# Patient Record
Sex: MALE | Race: WHITE | Employment: OTHER | URBAN - NONMETROPOLITAN AREA
[De-identification: names, ages, dates, MRNs, and addresses within clinical notes are randomized per-mention and may not be internally consistent; named-entity substitution may affect disease eponyms.]

---

## 2017-02-14 RX ORDER — HYDRALAZINE HYDROCHLORIDE 25 MG/1
TABLET, FILM COATED ORAL
Qty: 270 TABLET | Refills: 1 | Status: SHIPPED | OUTPATIENT
Start: 2017-02-14 | End: 2017-08-02 | Stop reason: SDUPTHER

## 2017-02-14 RX ORDER — GLIPIZIDE 5 MG/1
TABLET ORAL
Qty: 90 TABLET | Refills: 1 | Status: SHIPPED | OUTPATIENT
Start: 2017-02-14 | End: 2017-08-02 | Stop reason: SDUPTHER

## 2017-03-08 RX ORDER — CLOPIDOGREL BISULFATE 75 MG/1
TABLET ORAL
Qty: 90 TABLET | Refills: 1 | Status: SHIPPED | OUTPATIENT
Start: 2017-03-08 | End: 2017-10-02 | Stop reason: SDUPTHER

## 2017-03-09 RX ORDER — NIFEDIPINE 60 MG/1
TABLET, FILM COATED, EXTENDED RELEASE ORAL
Qty: 180 TABLET | Refills: 1 | Status: SHIPPED | OUTPATIENT
Start: 2017-03-09 | End: 2017-09-12 | Stop reason: SDUPTHER

## 2017-03-09 RX ORDER — CARVEDILOL 12.5 MG/1
TABLET ORAL
Qty: 180 TABLET | Refills: 1 | Status: SHIPPED | OUTPATIENT
Start: 2017-03-09 | End: 2017-09-12 | Stop reason: SDUPTHER

## 2017-03-10 ENCOUNTER — HOSPITAL ENCOUNTER (OUTPATIENT)
Dept: MEDSURG UNIT | Age: 77
Setting detail: OBSERVATION
Discharge: HOME | End: 2017-03-11
Attending: EMERGENCY MEDICINE | Admitting: INTERNAL MEDICINE
Payer: MEDICARE

## 2017-03-10 DIAGNOSIS — J81.0 ACUTE PULMONARY EDEMA (HCC): Primary | ICD-10-CM

## 2017-03-10 LAB
ABSOLUTE EOS #: 0.4 K/UL (ref 0–0.4)
ABSOLUTE LYMPH #: 1.6 K/UL (ref 0.9–2.5)
ABSOLUTE MONO #: 0.8 K/UL (ref 0–1)
ANION GAP SERPL CALCULATED.3IONS-SCNC: 17 MMOL/L (ref 9–17)
BASOPHILS # BLD: 1 % (ref 0–2)
BASOPHILS ABSOLUTE: 0 K/UL (ref 0–0.2)
BNP INTERPRETATION: ABNORMAL
BUN BLDV-MCNC: 37 MG/DL (ref 8–23)
BUN/CREAT BLD: 23 (ref 9–20)
CALCIUM SERPL-MCNC: 9.2 MG/DL (ref 8.6–10.4)
CHLORIDE BLD-SCNC: 102 MMOL/L (ref 98–107)
CO2: 16 MMOL/L (ref 20–31)
CREAT SERPL-MCNC: 1.6 MG/DL (ref 0.7–1.2)
DIFFERENTIAL TYPE: YES
EOSINOPHILS RELATIVE PERCENT: 4 % (ref 0–5)
ESTIMATED AVERAGE GLUCOSE: 154 MG/DL
GFR AFRICAN AMERICAN: 51 ML/MIN
GFR NON-AFRICAN AMERICAN: 42 ML/MIN
GFR SERPL CREATININE-BSD FRML MDRD: ABNORMAL ML/MIN/{1.73_M2}
GFR SERPL CREATININE-BSD FRML MDRD: ABNORMAL ML/MIN/{1.73_M2}
GLUCOSE BLD-MCNC: 183 MG/DL (ref 70–99)
GLUCOSE BLD-MCNC: 225 MG/DL (ref 65–99)
HBA1C MFR BLD: 7 % (ref 4.8–5.9)
HCT VFR BLD CALC: 33 % (ref 41–53)
HEMOGLOBIN: 11.2 G/DL (ref 13.5–17.5)
LYMPHOCYTES # BLD: 19 % (ref 13–44)
MCH RBC QN AUTO: 29.6 PG (ref 26–34)
MCHC RBC AUTO-ENTMCNC: 33.9 G/DL (ref 31–37)
MCV RBC AUTO: 87.2 FL (ref 80–100)
MONOCYTES # BLD: 10 % (ref 5–9)
PDW BLD-RTO: 14.6 % (ref 12.1–15.2)
PLATELET # BLD: 365 K/UL (ref 140–450)
PLATELET ESTIMATE: ABNORMAL
PMV BLD AUTO: ABNORMAL FL (ref 6–12)
POTASSIUM SERPL-SCNC: 5.3 MMOL/L (ref 3.7–5.3)
PRO-BNP: 1444 PG/ML
RBC # BLD: 3.79 M/UL (ref 4.5–5.9)
RBC # BLD: ABNORMAL 10*6/UL
SEG NEUTROPHILS: 66 % (ref 39–75)
SEGMENTED NEUTROPHILS ABSOLUTE COUNT: 5.7 K/UL (ref 2.1–6.5)
SODIUM BLD-SCNC: 135 MMOL/L (ref 135–144)
TROPONIN INTERP: NORMAL
TROPONIN INTERP: NORMAL
TROPONIN T: <0.03 NG/ML
TROPONIN T: <0.03 NG/ML
WBC # BLD: 8.6 K/UL (ref 3.5–11)
WBC # BLD: ABNORMAL 10*3/UL

## 2017-03-10 PROCEDURE — 82947 ASSAY GLUCOSE BLOOD QUANT: CPT

## 2017-03-10 PROCEDURE — 80048 BASIC METABOLIC PNL TOTAL CA: CPT

## 2017-03-10 PROCEDURE — 96375 TX/PRO/DX INJ NEW DRUG ADDON: CPT

## 2017-03-10 PROCEDURE — 83880 ASSAY OF NATRIURETIC PEPTIDE: CPT

## 2017-03-10 PROCEDURE — 36415 COLL VENOUS BLD VENIPUNCTURE: CPT

## 2017-03-10 PROCEDURE — 94664 DEMO&/EVAL PT USE INHALER: CPT

## 2017-03-10 PROCEDURE — 85025 COMPLETE CBC W/AUTO DIFF WBC: CPT

## 2017-03-10 PROCEDURE — 93005 ELECTROCARDIOGRAM TRACING: CPT

## 2017-03-10 PROCEDURE — 84484 ASSAY OF TROPONIN QUANT: CPT

## 2017-03-10 PROCEDURE — 99283 EMERGENCY DEPT VISIT LOW MDM: CPT

## 2017-03-10 PROCEDURE — 99285 EMERGENCY DEPT VISIT HI MDM: CPT

## 2017-03-10 PROCEDURE — 9990 CHARGE CONVERSION

## 2017-03-10 PROCEDURE — 96376 TX/PRO/DX INJ SAME DRUG ADON: CPT

## 2017-03-10 PROCEDURE — 99219 CHARGE CONVERSION: CPT

## 2017-03-10 PROCEDURE — 96372 THER/PROPH/DIAG INJ SC/IM: CPT

## 2017-03-10 PROCEDURE — 71010 CHARGE CONVERSION: CPT

## 2017-03-10 PROCEDURE — 93010 ELECTROCARDIOGRAM REPORT: CPT

## 2017-03-10 PROCEDURE — 83036 HEMOGLOBIN GLYCOSYLATED A1C: CPT

## 2017-03-10 PROCEDURE — 96372 THER/PROPH/DIAG INJ SC/IM: CPT | Performed by: INTERNAL MEDICINE

## 2017-03-10 PROCEDURE — 96374 THER/PROPH/DIAG INJ IV PUSH: CPT

## 2017-03-10 RX ORDER — ACETAMINOPHEN 325 MG/1
650 TABLET ORAL EVERY 4 HOURS PRN
Status: DISCONTINUED | OUTPATIENT
Start: 2017-03-10 | End: 2017-03-11 | Stop reason: HOSPADM

## 2017-03-10 RX ORDER — DEXTROSE MONOHYDRATE 25 G/50ML
12.5 INJECTION, SOLUTION INTRAVENOUS PRN
Status: DISCONTINUED | OUTPATIENT
Start: 2017-03-10 | End: 2017-03-11 | Stop reason: HOSPADM

## 2017-03-10 RX ORDER — GLIPIZIDE 5 MG/1
2.5 TABLET ORAL
Status: DISCONTINUED | OUTPATIENT
Start: 2017-03-10 | End: 2017-03-11 | Stop reason: HOSPADM

## 2017-03-10 RX ORDER — FUROSEMIDE 40 MG/1
40 TABLET ORAL 2 TIMES DAILY
Status: DISCONTINUED | OUTPATIENT
Start: 2017-03-10 | End: 2017-03-10

## 2017-03-10 RX ORDER — HYDRALAZINE HYDROCHLORIDE 25 MG/1
25 TABLET, FILM COATED ORAL EVERY 8 HOURS SCHEDULED
Status: DISCONTINUED | OUTPATIENT
Start: 2017-03-10 | End: 2017-03-11 | Stop reason: HOSPADM

## 2017-03-10 RX ORDER — FLUTICASONE PROPIONATE 50 MCG
1 SPRAY, SUSPENSION (ML) NASAL DAILY
Status: DISCONTINUED | OUTPATIENT
Start: 2017-03-10 | End: 2017-03-11 | Stop reason: HOSPADM

## 2017-03-10 RX ORDER — ALLOPURINOL 100 MG/1
100 TABLET ORAL DAILY
Status: DISCONTINUED | OUTPATIENT
Start: 2017-03-10 | End: 2017-03-11 | Stop reason: HOSPADM

## 2017-03-10 RX ORDER — FAMOTIDINE 20 MG/1
20 TABLET, FILM COATED ORAL DAILY
Status: DISCONTINUED | OUTPATIENT
Start: 2017-03-10 | End: 2017-03-11 | Stop reason: HOSPADM

## 2017-03-10 RX ORDER — ASPIRIN 81 MG/1
81 TABLET, CHEWABLE ORAL ONCE
Status: DISCONTINUED | OUTPATIENT
Start: 2017-03-10 | End: 2017-03-11 | Stop reason: HOSPADM

## 2017-03-10 RX ORDER — FUROSEMIDE 10 MG/ML
40 INJECTION INTRAMUSCULAR; INTRAVENOUS ONCE
Status: COMPLETED | OUTPATIENT
Start: 2017-03-10 | End: 2017-03-10

## 2017-03-10 RX ORDER — SODIUM CHLORIDE 0.9 % (FLUSH) 0.9 %
10 SYRINGE (ML) INJECTION PRN
Status: DISCONTINUED | OUTPATIENT
Start: 2017-03-10 | End: 2017-03-11 | Stop reason: HOSPADM

## 2017-03-10 RX ORDER — ALBUTEROL SULFATE 2.5 MG/3ML
2.5 SOLUTION RESPIRATORY (INHALATION) ONCE
Status: COMPLETED | OUTPATIENT
Start: 2017-03-10 | End: 2017-03-10

## 2017-03-10 RX ORDER — NICOTINE POLACRILEX 4 MG
15 LOZENGE BUCCAL PRN
Status: DISCONTINUED | OUTPATIENT
Start: 2017-03-10 | End: 2017-03-11 | Stop reason: HOSPADM

## 2017-03-10 RX ORDER — HYDRALAZINE HYDROCHLORIDE 20 MG/ML
10 INJECTION INTRAMUSCULAR; INTRAVENOUS EVERY 6 HOURS PRN
Status: DISCONTINUED | OUTPATIENT
Start: 2017-03-10 | End: 2017-03-11 | Stop reason: HOSPADM

## 2017-03-10 RX ORDER — M-VIT,TX,IRON,MINS/CALC/FOLIC 27MG-0.4MG
1 TABLET ORAL DAILY
Status: DISCONTINUED | OUTPATIENT
Start: 2017-03-11 | End: 2017-03-11 | Stop reason: HOSPADM

## 2017-03-10 RX ORDER — ISOSORBIDE MONONITRATE 30 MG/1
30 TABLET, EXTENDED RELEASE ORAL DAILY
Refills: 5 | Status: ON HOLD | COMMUNITY
Start: 2017-02-14 | End: 2017-03-11 | Stop reason: HOSPADM

## 2017-03-10 RX ORDER — FERROUS SULFATE 325(65) MG
325 TABLET ORAL
Status: DISCONTINUED | OUTPATIENT
Start: 2017-03-11 | End: 2017-03-11 | Stop reason: HOSPADM

## 2017-03-10 RX ORDER — LOSARTAN POTASSIUM 100 MG/1
100 TABLET ORAL DAILY
Status: DISCONTINUED | OUTPATIENT
Start: 2017-03-11 | End: 2017-03-11 | Stop reason: HOSPADM

## 2017-03-10 RX ORDER — FERROUS SULFATE 325(65) MG
325 TABLET ORAL 2 TIMES DAILY
COMMUNITY

## 2017-03-10 RX ORDER — SIMVASTATIN 40 MG
40 TABLET ORAL NIGHTLY
Status: DISCONTINUED | OUTPATIENT
Start: 2017-03-10 | End: 2017-03-11 | Stop reason: HOSPADM

## 2017-03-10 RX ORDER — ONDANSETRON 2 MG/ML
4 INJECTION INTRAMUSCULAR; INTRAVENOUS EVERY 6 HOURS PRN
Status: DISCONTINUED | OUTPATIENT
Start: 2017-03-10 | End: 2017-03-11 | Stop reason: HOSPADM

## 2017-03-10 RX ORDER — ASPIRIN 325 MG
325 TABLET ORAL DAILY
Refills: 1 | COMMUNITY
Start: 2017-02-10 | End: 2017-03-10 | Stop reason: ALTCHOICE

## 2017-03-10 RX ORDER — CARVEDILOL 12.5 MG/1
12.5 TABLET ORAL 2 TIMES DAILY
Status: DISCONTINUED | OUTPATIENT
Start: 2017-03-10 | End: 2017-03-11 | Stop reason: HOSPADM

## 2017-03-10 RX ORDER — CLOPIDOGREL BISULFATE 75 MG/1
75 TABLET ORAL DAILY
Status: DISCONTINUED | OUTPATIENT
Start: 2017-03-10 | End: 2017-03-11 | Stop reason: HOSPADM

## 2017-03-10 RX ORDER — GEMFIBROZIL 600 MG/1
600 TABLET, FILM COATED ORAL DAILY
Status: DISCONTINUED | OUTPATIENT
Start: 2017-03-10 | End: 2017-03-11 | Stop reason: HOSPADM

## 2017-03-10 RX ORDER — GREEN TEA/HOODIA GORDONII 315-12.5MG
1 CAPSULE ORAL
Status: DISCONTINUED | OUTPATIENT
Start: 2017-03-11 | End: 2017-03-10 | Stop reason: SDUPTHER

## 2017-03-10 RX ORDER — DEXTROSE MONOHYDRATE 50 MG/ML
100 INJECTION, SOLUTION INTRAVENOUS PRN
Status: DISCONTINUED | OUTPATIENT
Start: 2017-03-10 | End: 2017-03-11 | Stop reason: HOSPADM

## 2017-03-10 RX ORDER — ASCORBIC ACID 500 MG
500 TABLET ORAL DAILY
Status: DISCONTINUED | OUTPATIENT
Start: 2017-03-11 | End: 2017-03-11 | Stop reason: HOSPADM

## 2017-03-10 RX ORDER — SODIUM CHLORIDE 0.9 % (FLUSH) 0.9 %
10 SYRINGE (ML) INJECTION EVERY 12 HOURS SCHEDULED
Status: DISCONTINUED | OUTPATIENT
Start: 2017-03-10 | End: 2017-03-11 | Stop reason: HOSPADM

## 2017-03-10 RX ORDER — NIFEDIPINE 30 MG/1
60 TABLET, EXTENDED RELEASE ORAL 2 TIMES DAILY
Status: DISCONTINUED | OUTPATIENT
Start: 2017-03-10 | End: 2017-03-11 | Stop reason: HOSPADM

## 2017-03-10 RX ORDER — FUROSEMIDE 10 MG/ML
20 INJECTION INTRAMUSCULAR; INTRAVENOUS 2 TIMES DAILY
Status: DISCONTINUED | OUTPATIENT
Start: 2017-03-10 | End: 2017-03-11 | Stop reason: HOSPADM

## 2017-03-10 RX ORDER — GREEN TEA/HOODIA GORDONII 315-12.5MG
1 CAPSULE ORAL
COMMUNITY
End: 2019-01-01 | Stop reason: ALTCHOICE

## 2017-03-10 RX ORDER — ISOSORBIDE MONONITRATE 30 MG/1
30 TABLET, EXTENDED RELEASE ORAL DAILY
Status: DISCONTINUED | OUTPATIENT
Start: 2017-03-10 | End: 2017-03-11

## 2017-03-10 RX ADMIN — FUROSEMIDE 40 MG: 10 INJECTION INTRAMUSCULAR; INTRAVENOUS at 15:19

## 2017-03-10 RX ADMIN — HYDRALAZINE HYDROCHLORIDE 25 MG: 25 TABLET ORAL at 20:32

## 2017-03-10 RX ADMIN — CARVEDILOL 12.5 MG: 12.5 TABLET, FILM COATED ORAL at 20:30

## 2017-03-10 RX ADMIN — Medication 40 MG: at 20:33

## 2017-03-10 RX ADMIN — NIFEDIPINE 60 MG: 30 TABLET, FILM COATED, EXTENDED RELEASE ORAL at 20:32

## 2017-03-10 RX ADMIN — ALLOPURINOL 100 MG: 100 TABLET ORAL at 20:30

## 2017-03-10 RX ADMIN — Medication 10 ML: at 22:55

## 2017-03-10 RX ADMIN — HYDRALAZINE HYDROCHLORIDE 10 MG: 20 INJECTION INTRAMUSCULAR; INTRAVENOUS at 22:55

## 2017-03-10 RX ADMIN — FUROSEMIDE 20 MG: 10 INJECTION, SOLUTION INTRAMUSCULAR; INTRAVENOUS at 21:11

## 2017-03-10 RX ADMIN — Medication 10 ML: at 21:10

## 2017-03-10 RX ADMIN — ALBUTEROL SULFATE 2.5 MG: 2.5 SOLUTION RESPIRATORY (INHALATION) at 15:23

## 2017-03-10 ASSESSMENT — ENCOUNTER SYMPTOMS
FACIAL SWELLING: 0
SWOLLEN GLANDS: 0
BLOOD IN STOOL: 0
ALLERGIC/IMMUNOLOGIC NEGATIVE: 1
ANAL BLEEDING: 0
SINUS PRESSURE: 0
SPUTUM PRODUCTION: 0
ABDOMINAL DISTENTION: 0
DIARRHEA: 0
TROUBLE SWALLOWING: 0
STRIDOR: 0
NAUSEA: 0
HEMOPTYSIS: 0
CHEST TIGHTNESS: 0
COUGH: 1
CHOKING: 0
RECTAL PAIN: 0
APNEA: 0
SORE THROAT: 0
SHORTNESS OF BREATH: 1
ABDOMINAL PAIN: 0
VOMITING: 0
EYES NEGATIVE: 1
WHEEZING: 0
RHINORRHEA: 0
CONSTIPATION: 0

## 2017-03-11 VITALS
OXYGEN SATURATION: 96 % | DIASTOLIC BLOOD PRESSURE: 87 MMHG | TEMPERATURE: 98.3 F | SYSTOLIC BLOOD PRESSURE: 190 MMHG | HEIGHT: 68 IN | BODY MASS INDEX: 29.15 KG/M2 | RESPIRATION RATE: 20 BRPM | HEART RATE: 85 BPM | WEIGHT: 192.3 LBS

## 2017-03-11 PROBLEM — R06.02 SHORTNESS OF BREATH: Status: ACTIVE | Noted: 2017-03-11

## 2017-03-11 LAB
ABSOLUTE EOS #: 0.2 K/UL (ref 0–0.4)
ABSOLUTE LYMPH #: 1.9 K/UL (ref 0.9–2.5)
ABSOLUTE MONO #: 0.7 K/UL (ref 0–1)
ANION GAP SERPL CALCULATED.3IONS-SCNC: 14 MMOL/L (ref 9–17)
BASOPHILS # BLD: 1 % (ref 0–2)
BASOPHILS ABSOLUTE: 0 K/UL (ref 0–0.2)
BUN BLDV-MCNC: 36 MG/DL (ref 8–23)
BUN/CREAT BLD: 22 (ref 9–20)
CALCIUM SERPL-MCNC: 9 MG/DL (ref 8.6–10.4)
CHLORIDE BLD-SCNC: 103 MMOL/L (ref 98–107)
CO2: 19 MMOL/L (ref 20–31)
CREAT SERPL-MCNC: 1.65 MG/DL (ref 0.7–1.2)
DIFFERENTIAL TYPE: YES
EKG ATRIAL RATE: 76 BPM
EKG ATRIAL RATE: 87 BPM
EKG P AXIS: 40 DEGREES
EKG P AXIS: 62 DEGREES
EKG P-R INTERVAL: 226 MS
EKG P-R INTERVAL: 232 MS
EKG Q-T INTERVAL: 386 MS
EKG Q-T INTERVAL: 402 MS
EKG QRS DURATION: 100 MS
EKG QRS DURATION: 102 MS
EKG QTC CALCULATION (BAZETT): 452 MS
EKG QTC CALCULATION (BAZETT): 464 MS
EKG R AXIS: 10 DEGREES
EKG R AXIS: 76 DEGREES
EKG T AXIS: 146 DEGREES
EKG T AXIS: 90 DEGREES
EKG VENTRICULAR RATE: 76 BPM
EKG VENTRICULAR RATE: 87 BPM
EOSINOPHILS RELATIVE PERCENT: 3 % (ref 0–5)
GFR AFRICAN AMERICAN: 50 ML/MIN
GFR NON-AFRICAN AMERICAN: 41 ML/MIN
GFR SERPL CREATININE-BSD FRML MDRD: ABNORMAL ML/MIN/{1.73_M2}
GFR SERPL CREATININE-BSD FRML MDRD: ABNORMAL ML/MIN/{1.73_M2}
GLUCOSE BLD-MCNC: 167 MG/DL (ref 65–99)
GLUCOSE BLD-MCNC: 171 MG/DL (ref 70–99)
HCT VFR BLD CALC: 30.8 % (ref 41–53)
HEMOGLOBIN: 10.4 G/DL (ref 13.5–17.5)
LYMPHOCYTES # BLD: 27 % (ref 13–44)
MCH RBC QN AUTO: 29.8 PG (ref 26–34)
MCHC RBC AUTO-ENTMCNC: 33.8 G/DL (ref 31–37)
MCV RBC AUTO: 88 FL (ref 80–100)
MONOCYTES # BLD: 10 % (ref 5–9)
PDW BLD-RTO: 14.4 % (ref 12.1–15.2)
PLATELET # BLD: 334 K/UL (ref 140–450)
PLATELET ESTIMATE: ABNORMAL
PMV BLD AUTO: ABNORMAL FL (ref 6–12)
POTASSIUM SERPL-SCNC: 3.7 MMOL/L (ref 3.7–5.3)
RBC # BLD: 3.49 M/UL (ref 4.5–5.9)
RBC # BLD: ABNORMAL 10*6/UL
SEG NEUTROPHILS: 59 % (ref 39–75)
SEGMENTED NEUTROPHILS ABSOLUTE COUNT: 4.2 K/UL (ref 2.1–6.5)
SODIUM BLD-SCNC: 136 MMOL/L (ref 135–144)
TROPONIN INTERP: NORMAL
TROPONIN T: <0.03 NG/ML
WBC # BLD: 7 K/UL (ref 3.5–11)
WBC # BLD: ABNORMAL 10*3/UL

## 2017-03-11 PROCEDURE — 6370000000 HC RX 637 (ALT 250 FOR IP): Performed by: INTERNAL MEDICINE

## 2017-03-11 PROCEDURE — G0378 HOSPITAL OBSERVATION PER HR: HCPCS

## 2017-03-11 PROCEDURE — 6360000002 HC RX W HCPCS: Performed by: INTERNAL MEDICINE

## 2017-03-11 PROCEDURE — 2580000003 HC RX 258: Performed by: INTERNAL MEDICINE

## 2017-03-11 PROCEDURE — 93005 ELECTROCARDIOGRAM TRACING: CPT

## 2017-03-11 PROCEDURE — 82947 ASSAY GLUCOSE BLOOD QUANT: CPT

## 2017-03-11 RX ORDER — ISOSORBIDE MONONITRATE 30 MG/1
30 TABLET, EXTENDED RELEASE ORAL 2 TIMES DAILY
Qty: 60 TABLET | Refills: 3 | OUTPATIENT
Start: 2017-03-11 | End: 2017-03-20 | Stop reason: SDUPTHER

## 2017-03-11 RX ORDER — ISOSORBIDE MONONITRATE 30 MG/1
30 TABLET, EXTENDED RELEASE ORAL 2 TIMES DAILY
Status: DISCONTINUED | OUTPATIENT
Start: 2017-03-11 | End: 2017-03-11 | Stop reason: HOSPADM

## 2017-03-11 RX ADMIN — METFORMIN HYDROCHLORIDE 1000 MG: 1000 TABLET ORAL at 08:06

## 2017-03-11 RX ADMIN — Medication 10 ML: at 08:00

## 2017-03-11 RX ADMIN — GEMFIBROZIL 600 MG: 600 TABLET, FILM COATED ORAL at 08:05

## 2017-03-11 RX ADMIN — HYDRALAZINE HYDROCHLORIDE 25 MG: 25 TABLET ORAL at 06:21

## 2017-03-11 RX ADMIN — CARVEDILOL 12.5 MG: 12.5 TABLET, FILM COATED ORAL at 08:04

## 2017-03-11 RX ADMIN — OXYCODONE HYDROCHLORIDE AND ACETAMINOPHEN 500 MG: 500 TABLET ORAL at 08:06

## 2017-03-11 RX ADMIN — CLOPIDOGREL BISULFATE 75 MG: 75 TABLET, FILM COATED ORAL at 08:04

## 2017-03-11 RX ADMIN — MULTIPLE VITAMINS W/ MINERALS TAB 1 TABLET: TAB at 08:06

## 2017-03-11 RX ADMIN — GLIPIZIDE 2.5 MG: 5 TABLET ORAL at 08:05

## 2017-03-11 RX ADMIN — ISOSORBIDE MONONITRATE 30 MG: 30 TABLET, EXTENDED RELEASE ORAL at 08:05

## 2017-03-11 RX ADMIN — LOSARTAN POTASSIUM 100 MG: 100 TABLET ORAL at 08:05

## 2017-03-11 RX ADMIN — FAMOTIDINE 20 MG: 20 TABLET, FILM COATED ORAL at 08:04

## 2017-03-11 RX ADMIN — FUROSEMIDE 20 MG: 10 INJECTION, SOLUTION INTRAMUSCULAR; INTRAVENOUS at 08:00

## 2017-03-11 RX ADMIN — Medication 325 MG: at 08:03

## 2017-03-11 RX ADMIN — IRON SUPPLEMENT 325 MG: 325 TABLET ORAL at 08:04

## 2017-03-11 RX ADMIN — NIFEDIPINE 60 MG: 30 TABLET, FILM COATED, EXTENDED RELEASE ORAL at 08:06

## 2017-03-13 ENCOUNTER — CARE COORDINATION (OUTPATIENT)
Dept: FAMILY MEDICINE CLINIC | Age: 77
End: 2017-03-13

## 2017-03-20 ENCOUNTER — OFFICE VISIT (OUTPATIENT)
Dept: FAMILY MEDICINE CLINIC | Age: 77
End: 2017-03-20
Payer: MEDICARE

## 2017-03-20 VITALS
SYSTOLIC BLOOD PRESSURE: 146 MMHG | WEIGHT: 187.25 LBS | HEIGHT: 68 IN | BODY MASS INDEX: 28.38 KG/M2 | HEART RATE: 84 BPM | DIASTOLIC BLOOD PRESSURE: 64 MMHG

## 2017-03-20 DIAGNOSIS — Z79.4 TYPE 2 DIABETES MELLITUS WITH STAGE 3 CHRONIC KIDNEY DISEASE, WITH LONG-TERM CURRENT USE OF INSULIN (HCC): ICD-10-CM

## 2017-03-20 DIAGNOSIS — I25.10 CORONARY ARTERY DISEASE INVOLVING NATIVE HEART WITHOUT ANGINA PECTORIS, UNSPECIFIED VESSEL OR LESION TYPE: ICD-10-CM

## 2017-03-20 DIAGNOSIS — E11.22 TYPE 2 DIABETES MELLITUS WITH STAGE 3 CHRONIC KIDNEY DISEASE, WITH LONG-TERM CURRENT USE OF INSULIN (HCC): ICD-10-CM

## 2017-03-20 DIAGNOSIS — I10 ESSENTIAL HYPERTENSION: ICD-10-CM

## 2017-03-20 DIAGNOSIS — N18.30 TYPE 2 DIABETES MELLITUS WITH STAGE 3 CHRONIC KIDNEY DISEASE, WITH LONG-TERM CURRENT USE OF INSULIN (HCC): ICD-10-CM

## 2017-03-20 DIAGNOSIS — J81.0 ACUTE PULMONARY EDEMA (HCC): Primary | ICD-10-CM

## 2017-03-20 PROCEDURE — 99495 TRANSJ CARE MGMT MOD F2F 14D: CPT | Performed by: FAMILY MEDICINE

## 2017-03-20 RX ORDER — ISOSORBIDE MONONITRATE 30 MG/1
30 TABLET, EXTENDED RELEASE ORAL 2 TIMES DAILY
Qty: 60 TABLET | Refills: 5 | Status: SHIPPED | OUTPATIENT
Start: 2017-03-20 | End: 2017-09-12 | Stop reason: SDUPTHER

## 2017-03-20 RX ORDER — NIFEDIPINE 60 MG/1
TABLET, EXTENDED RELEASE ORAL
Refills: 1 | COMMUNITY
Start: 2017-03-09 | End: 2017-05-11 | Stop reason: SDUPTHER

## 2017-03-20 ASSESSMENT — ENCOUNTER SYMPTOMS
SHORTNESS OF BREATH: 0
ABDOMINAL PAIN: 0
CONSTIPATION: 0
SORE THROAT: 0
DIARRHEA: 0
NAUSEA: 0
RHINORRHEA: 1
COUGH: 0

## 2017-04-06 RX ORDER — GEMFIBROZIL 600 MG/1
600 TABLET, FILM COATED ORAL DAILY
Qty: 90 TABLET | Refills: 1 | Status: SHIPPED | OUTPATIENT
Start: 2017-04-06 | End: 2017-10-10 | Stop reason: SDUPTHER

## 2017-05-04 RX ORDER — ASPIRIN 325 MG
325 TABLET, DELAYED RELEASE (ENTERIC COATED) ORAL DAILY
Qty: 90 TABLET | Refills: 1 | Status: SHIPPED | OUTPATIENT
Start: 2017-05-04 | End: 2017-11-06 | Stop reason: SDUPTHER

## 2017-05-04 RX ORDER — FUROSEMIDE 40 MG/1
TABLET ORAL
Qty: 180 TABLET | Refills: 1 | Status: SHIPPED | OUTPATIENT
Start: 2017-05-04 | End: 2017-12-28 | Stop reason: SDUPTHER

## 2017-05-05 RX ORDER — SIMVASTATIN 40 MG
TABLET ORAL
Qty: 90 TABLET | Refills: 1 | Status: SHIPPED | OUTPATIENT
Start: 2017-05-05 | End: 2017-11-06 | Stop reason: SDUPTHER

## 2017-05-05 RX ORDER — FAMOTIDINE 20 MG/1
TABLET, FILM COATED ORAL
Qty: 90 TABLET | Refills: 1 | Status: SHIPPED | OUTPATIENT
Start: 2017-05-05 | End: 2017-11-06 | Stop reason: SDUPTHER

## 2017-05-08 RX ORDER — LOSARTAN POTASSIUM 100 MG/1
TABLET ORAL
Qty: 30 TABLET | Refills: 2 | Status: SHIPPED | OUTPATIENT
Start: 2017-05-08 | End: 2017-07-13 | Stop reason: SDUPTHER

## 2017-05-11 ENCOUNTER — OFFICE VISIT (OUTPATIENT)
Dept: FAMILY MEDICINE CLINIC | Age: 77
End: 2017-05-11
Payer: MEDICARE

## 2017-05-11 VITALS
DIASTOLIC BLOOD PRESSURE: 70 MMHG | HEART RATE: 80 BPM | BODY MASS INDEX: 28.64 KG/M2 | HEIGHT: 68 IN | WEIGHT: 189 LBS | SYSTOLIC BLOOD PRESSURE: 142 MMHG

## 2017-05-11 DIAGNOSIS — N18.30 TYPE 2 DIABETES MELLITUS WITH STAGE 3 CHRONIC KIDNEY DISEASE, WITH LONG-TERM CURRENT USE OF INSULIN (HCC): Primary | ICD-10-CM

## 2017-05-11 DIAGNOSIS — I25.10 CORONARY ARTERY DISEASE INVOLVING NATIVE HEART WITHOUT ANGINA PECTORIS, UNSPECIFIED VESSEL OR LESION TYPE: ICD-10-CM

## 2017-05-11 DIAGNOSIS — E11.22 TYPE 2 DIABETES MELLITUS WITH STAGE 3 CHRONIC KIDNEY DISEASE, WITH LONG-TERM CURRENT USE OF INSULIN (HCC): Primary | ICD-10-CM

## 2017-05-11 DIAGNOSIS — E78.2 MIXED HYPERLIPIDEMIA: ICD-10-CM

## 2017-05-11 DIAGNOSIS — I10 ESSENTIAL HYPERTENSION: ICD-10-CM

## 2017-05-11 DIAGNOSIS — Z79.4 TYPE 2 DIABETES MELLITUS WITH STAGE 3 CHRONIC KIDNEY DISEASE, WITH LONG-TERM CURRENT USE OF INSULIN (HCC): Primary | ICD-10-CM

## 2017-05-11 PROCEDURE — G8427 DOCREV CUR MEDS BY ELIG CLIN: HCPCS | Performed by: FAMILY MEDICINE

## 2017-05-11 PROCEDURE — 1123F ACP DISCUSS/DSCN MKR DOCD: CPT | Performed by: FAMILY MEDICINE

## 2017-05-11 PROCEDURE — G8598 ASA/ANTIPLAT THER USED: HCPCS | Performed by: FAMILY MEDICINE

## 2017-05-11 PROCEDURE — G8420 CALC BMI NORM PARAMETERS: HCPCS | Performed by: FAMILY MEDICINE

## 2017-05-11 PROCEDURE — 99213 OFFICE O/P EST LOW 20 MIN: CPT | Performed by: FAMILY MEDICINE

## 2017-05-11 PROCEDURE — 4040F PNEUMOC VAC/ADMIN/RCVD: CPT | Performed by: FAMILY MEDICINE

## 2017-05-11 PROCEDURE — 3045F PR MOST RECENT HEMOGLOBIN A1C LEVEL 7.0-9.0%: CPT | Performed by: FAMILY MEDICINE

## 2017-05-11 PROCEDURE — 3017F COLORECTAL CA SCREEN DOC REV: CPT | Performed by: FAMILY MEDICINE

## 2017-05-11 PROCEDURE — 1036F TOBACCO NON-USER: CPT | Performed by: FAMILY MEDICINE

## 2017-05-11 ASSESSMENT — PATIENT HEALTH QUESTIONNAIRE - PHQ9
2. FEELING DOWN, DEPRESSED OR HOPELESS: 0
1. LITTLE INTEREST OR PLEASURE IN DOING THINGS: 0
SUM OF ALL RESPONSES TO PHQ9 QUESTIONS 1 & 2: 0
SUM OF ALL RESPONSES TO PHQ QUESTIONS 1-9: 0

## 2017-05-11 ASSESSMENT — ENCOUNTER SYMPTOMS
BACK PAIN: 0
DIARRHEA: 0
RHINORRHEA: 1
COUGH: 0
CONSTIPATION: 0
ABDOMINAL PAIN: 0
SHORTNESS OF BREATH: 0
SORE THROAT: 0

## 2017-06-02 ENCOUNTER — HOSPITAL ENCOUNTER (OUTPATIENT)
Age: 77
Discharge: HOME | End: 2017-06-02
Payer: MEDICARE

## 2017-06-02 DIAGNOSIS — E55.9 UNSPECIFIED VITAMIN D DEFICIENCY: ICD-10-CM

## 2017-06-02 DIAGNOSIS — I25.10 CORONARY ARTERY DISEASE INVOLVING NATIVE CORONARY ARTERY OF NATIVE HEART WITHOUT ANGINA PECTORIS: ICD-10-CM

## 2017-06-02 DIAGNOSIS — E11.9 TYPE 2 DIABETES MELLITUS WITHOUT COMPLICATION (HCC): ICD-10-CM

## 2017-06-02 DIAGNOSIS — E78.5 HYPERLIPIDEMIA: ICD-10-CM

## 2017-06-02 LAB
ABSOLUTE EOS #: 0.3 K/UL (ref 0–0.4)
ABSOLUTE LYMPH #: 1.9 K/UL (ref 0.9–2.5)
ABSOLUTE MONO #: 0.7 K/UL (ref 0–1)
ALBUMIN SERPL-MCNC: 4.8 G/DL (ref 3.5–5.2)
ALBUMIN/GLOBULIN RATIO: ABNORMAL (ref 1–2.5)
ALP BLD-CCNC: 57 U/L (ref 40–129)
ALT SERPL-CCNC: 13 U/L (ref 5–41)
ANION GAP SERPL CALCULATED.3IONS-SCNC: 20 MMOL/L (ref 9–17)
AST SERPL-CCNC: 17 U/L
BASOPHILS # BLD: 0 %
BASOPHILS ABSOLUTE: 0 K/UL (ref 0–0.2)
BILIRUB SERPL-MCNC: 0.31 MG/DL (ref 0.3–1.2)
BUN BLDV-MCNC: 51 MG/DL (ref 8–23)
BUN/CREAT BLD: 29 (ref 9–20)
CALCIUM SERPL-MCNC: 10.3 MG/DL (ref 8.6–10.4)
CHLORIDE BLD-SCNC: 96 MMOL/L (ref 98–107)
CHOLESTEROL/HDL RATIO: 4.4
CHOLESTEROL: 163 MG/DL
CO2: 22 MMOL/L (ref 20–31)
CREAT SERPL-MCNC: 1.73 MG/DL (ref 0.7–1.2)
DIFFERENTIAL TYPE: YES
EOSINOPHILS RELATIVE PERCENT: 3 %
GFR AFRICAN AMERICAN: 47 ML/MIN
GFR NON-AFRICAN AMERICAN: 39 ML/MIN
GFR SERPL CREATININE-BSD FRML MDRD: ABNORMAL ML/MIN/{1.73_M2}
GFR SERPL CREATININE-BSD FRML MDRD: ABNORMAL ML/MIN/{1.73_M2}
GLUCOSE BLD-MCNC: 192 MG/DL (ref 70–99)
HCT VFR BLD CALC: 36.2 % (ref 41–53)
HDLC SERPL-MCNC: 37 MG/DL
HEMOGLOBIN: 12.3 G/DL (ref 13.5–17.5)
LDL CHOLESTEROL DIRECT: 45 MG/DL
LDL CHOLESTEROL: ABNORMAL MG/DL (ref 0–130)
LYMPHOCYTES # BLD: 25 %
MAGNESIUM: 2.5 MG/DL (ref 1.6–2.6)
MCH RBC QN AUTO: 29.5 PG (ref 26–34)
MCHC RBC AUTO-ENTMCNC: 34.1 G/DL (ref 31–37)
MCV RBC AUTO: 86.4 FL (ref 80–100)
MONOCYTES # BLD: 9 %
PATIENT FASTING?: YES
PDW BLD-RTO: 14.2 % (ref 12.1–15.2)
PLATELET # BLD: 321 K/UL (ref 140–450)
PLATELET ESTIMATE: ABNORMAL
PMV BLD AUTO: ABNORMAL FL (ref 6–12)
POTASSIUM SERPL-SCNC: 4.3 MMOL/L (ref 3.7–5.3)
RBC # BLD: 4.19 M/UL (ref 4.5–5.9)
RBC # BLD: ABNORMAL 10*6/UL
SEG NEUTROPHILS: 63 %
SEGMENTED NEUTROPHILS ABSOLUTE COUNT: 4.8 K/UL (ref 2.1–6.5)
SODIUM BLD-SCNC: 138 MMOL/L (ref 135–144)
THYROXINE, FREE: 1.24 NG/DL (ref 0.93–1.7)
TOTAL PROTEIN: 7.8 G/DL (ref 6.4–8.3)
TRIGL SERPL-MCNC: 610 MG/DL
TSH SERPL DL<=0.05 MIU/L-ACNC: 7.48 MIU/L (ref 0.3–5)
VITAMIN D 25-HYDROXY: 34.1 NG/ML (ref 30–100)
VLDLC SERPL CALC-MCNC: ABNORMAL MG/DL (ref 1–30)
WBC # BLD: 7.6 K/UL (ref 3.5–11)
WBC # BLD: ABNORMAL 10*3/UL

## 2017-06-02 PROCEDURE — 85025 COMPLETE CBC W/AUTO DIFF WBC: CPT

## 2017-06-02 PROCEDURE — 80053 COMPREHEN METABOLIC PANEL: CPT

## 2017-06-02 PROCEDURE — 82306 VITAMIN D 25 HYDROXY: CPT

## 2017-06-02 PROCEDURE — 83735 ASSAY OF MAGNESIUM: CPT

## 2017-06-02 PROCEDURE — 83721 ASSAY OF BLOOD LIPOPROTEIN: CPT

## 2017-06-02 PROCEDURE — 84443 ASSAY THYROID STIM HORMONE: CPT

## 2017-06-02 PROCEDURE — 80061 LIPID PANEL: CPT

## 2017-06-02 PROCEDURE — 84439 ASSAY OF FREE THYROXINE: CPT

## 2017-06-02 PROCEDURE — 36415 COLL VENOUS BLD VENIPUNCTURE: CPT

## 2017-06-05 ENCOUNTER — OFFICE VISIT (OUTPATIENT)
Dept: CARDIOLOGY CLINIC | Age: 77
End: 2017-06-05
Payer: MEDICARE

## 2017-06-05 VITALS
BODY MASS INDEX: 28.59 KG/M2 | WEIGHT: 188 LBS | SYSTOLIC BLOOD PRESSURE: 138 MMHG | DIASTOLIC BLOOD PRESSURE: 60 MMHG | HEART RATE: 90 BPM | OXYGEN SATURATION: 98 %

## 2017-06-05 DIAGNOSIS — E78.2 MIXED HYPERLIPIDEMIA: ICD-10-CM

## 2017-06-05 DIAGNOSIS — I25.10 CORONARY ARTERY DISEASE INVOLVING NATIVE CORONARY ARTERY OF NATIVE HEART WITHOUT ANGINA PECTORIS: Primary | ICD-10-CM

## 2017-06-05 DIAGNOSIS — E55.9 VITAMIN D DEFICIENCY DISEASE: ICD-10-CM

## 2017-06-05 DIAGNOSIS — E11.69 TYPE 2 DIABETES MELLITUS WITH OTHER SPECIFIED COMPLICATION (HCC): ICD-10-CM

## 2017-06-05 PROCEDURE — 1123F ACP DISCUSS/DSCN MKR DOCD: CPT | Performed by: INTERNAL MEDICINE

## 2017-06-05 PROCEDURE — G8427 DOCREV CUR MEDS BY ELIG CLIN: HCPCS | Performed by: INTERNAL MEDICINE

## 2017-06-05 PROCEDURE — 1036F TOBACCO NON-USER: CPT | Performed by: INTERNAL MEDICINE

## 2017-06-05 PROCEDURE — G8420 CALC BMI NORM PARAMETERS: HCPCS | Performed by: INTERNAL MEDICINE

## 2017-06-05 PROCEDURE — 99214 OFFICE O/P EST MOD 30 MIN: CPT | Performed by: INTERNAL MEDICINE

## 2017-06-05 PROCEDURE — G8598 ASA/ANTIPLAT THER USED: HCPCS | Performed by: INTERNAL MEDICINE

## 2017-06-05 PROCEDURE — 4040F PNEUMOC VAC/ADMIN/RCVD: CPT | Performed by: INTERNAL MEDICINE

## 2017-06-05 PROCEDURE — 3017F COLORECTAL CA SCREEN DOC REV: CPT | Performed by: INTERNAL MEDICINE

## 2017-07-14 RX ORDER — LOSARTAN POTASSIUM 100 MG/1
TABLET ORAL
Qty: 90 TABLET | Refills: 1 | Status: SHIPPED | OUTPATIENT
Start: 2017-07-14 | End: 2017-08-07 | Stop reason: SDUPTHER

## 2017-08-03 RX ORDER — GLIPIZIDE 5 MG/1
TABLET ORAL
Qty: 90 TABLET | Refills: 1 | Status: SHIPPED | OUTPATIENT
Start: 2017-08-03 | End: 2018-01-08 | Stop reason: SDUPTHER

## 2017-08-03 RX ORDER — HYDRALAZINE HYDROCHLORIDE 25 MG/1
TABLET, FILM COATED ORAL
Qty: 270 TABLET | Refills: 1 | Status: SHIPPED | OUTPATIENT
Start: 2017-08-03 | End: 2018-01-08 | Stop reason: SDUPTHER

## 2017-08-07 RX ORDER — LOSARTAN POTASSIUM 100 MG/1
TABLET ORAL
Qty: 90 TABLET | Refills: 1 | Status: SHIPPED | OUTPATIENT
Start: 2017-08-07 | End: 2018-01-01 | Stop reason: SDUPTHER

## 2017-08-12 ENCOUNTER — HOSPITAL ENCOUNTER (OUTPATIENT)
Age: 77
Discharge: HOME | End: 2017-08-12
Payer: MEDICARE

## 2017-08-12 LAB
HCT VFR BLD CALC: 34.5 % (ref 41–53)
HEMOGLOBIN: 11.9 G/DL (ref 13.5–17.5)

## 2017-08-12 PROCEDURE — 85014 HEMATOCRIT: CPT

## 2017-08-12 PROCEDURE — 85018 HEMOGLOBIN: CPT

## 2017-08-12 PROCEDURE — 36415 COLL VENOUS BLD VENIPUNCTURE: CPT

## 2017-08-15 ENCOUNTER — HOSPITAL ENCOUNTER (OUTPATIENT)
Age: 77
Discharge: HOME | End: 2017-08-15
Payer: MEDICARE

## 2017-08-29 ENCOUNTER — HOSPITAL ENCOUNTER (OUTPATIENT)
Age: 77
Discharge: HOME | End: 2017-08-29
Payer: MEDICARE

## 2017-08-29 LAB
ALBUMIN SERPL-MCNC: 4.9 G/DL (ref 3.5–5.2)
ANION GAP SERPL CALCULATED.3IONS-SCNC: 17 MMOL/L (ref 9–17)
BUN BLDV-MCNC: 52 MG/DL (ref 8–23)
BUN/CREAT BLD: 24 (ref 9–20)
CALCIUM SERPL-MCNC: 10.2 MG/DL (ref 8.6–10.4)
CHLORIDE BLD-SCNC: 98 MMOL/L (ref 98–107)
CO2: 22 MMOL/L (ref 20–31)
CREAT SERPL-MCNC: 2.13 MG/DL (ref 0.7–1.2)
CREATININE URINE: 91.8 MG/DL (ref 39–259)
FERRITIN: 72 UG/L (ref 30–400)
FOLATE: >20 NG/ML
GFR AFRICAN AMERICAN: 37 ML/MIN
GFR NON-AFRICAN AMERICAN: 31 ML/MIN
GFR SERPL CREATININE-BSD FRML MDRD: ABNORMAL ML/MIN/{1.73_M2}
GFR SERPL CREATININE-BSD FRML MDRD: ABNORMAL ML/MIN/{1.73_M2}
GLUCOSE BLD-MCNC: 133 MG/DL (ref 70–99)
IRON SATURATION: 21 % (ref 20–55)
IRON: 88 UG/DL (ref 59–158)
MICROALBUMIN/CREAT 24H UR: 54 MG/L
MICROALBUMIN/CREAT UR-RTO: 59 MCG/MG CREAT
PHOSPHORUS: 4.1 MG/DL (ref 2.5–4.5)
POTASSIUM SERPL-SCNC: 4.1 MMOL/L (ref 3.7–5.3)
PTH INTACT: 17.51 PG/ML (ref 15–65)
SODIUM BLD-SCNC: 137 MMOL/L (ref 135–144)
TOTAL IRON BINDING CAPACITY: 420 UG/DL (ref 250–450)
TOTAL PROTEIN, URINE: 11 MG/DL
TRANSFERRIN: 371 MG/DL (ref 200–360)
UNSATURATED IRON BINDING CAPACITY: 332 UG/DL (ref 112–347)
URIC ACID: 10.9 MG/DL (ref 3.4–7)
VITAMIN D 25-HYDROXY: 76.3 NG/ML (ref 30–100)

## 2017-08-29 PROCEDURE — 80069 RENAL FUNCTION PANEL: CPT

## 2017-08-29 PROCEDURE — 84550 ASSAY OF BLOOD/URIC ACID: CPT

## 2017-08-29 PROCEDURE — 82746 ASSAY OF FOLIC ACID SERUM: CPT

## 2017-08-29 PROCEDURE — 82728 ASSAY OF FERRITIN: CPT

## 2017-08-29 PROCEDURE — 82570 ASSAY OF URINE CREATININE: CPT

## 2017-08-29 PROCEDURE — 82306 VITAMIN D 25 HYDROXY: CPT

## 2017-08-29 PROCEDURE — 83550 IRON BINDING TEST: CPT

## 2017-08-29 PROCEDURE — 83540 ASSAY OF IRON: CPT

## 2017-08-29 PROCEDURE — 83970 ASSAY OF PARATHORMONE: CPT

## 2017-08-29 PROCEDURE — 84466 ASSAY OF TRANSFERRIN: CPT

## 2017-08-29 PROCEDURE — 36415 COLL VENOUS BLD VENIPUNCTURE: CPT

## 2017-08-29 PROCEDURE — 82043 UR ALBUMIN QUANTITATIVE: CPT

## 2017-08-29 PROCEDURE — 84156 ASSAY OF PROTEIN URINE: CPT

## 2017-08-31 ENCOUNTER — OFFICE VISIT (OUTPATIENT)
Dept: FAMILY MEDICINE CLINIC | Age: 77
End: 2017-08-31
Payer: MEDICARE

## 2017-08-31 VITALS
HEART RATE: 80 BPM | OXYGEN SATURATION: 98 % | SYSTOLIC BLOOD PRESSURE: 138 MMHG | BODY MASS INDEX: 28.13 KG/M2 | WEIGHT: 185 LBS | DIASTOLIC BLOOD PRESSURE: 70 MMHG

## 2017-08-31 DIAGNOSIS — I25.10 CORONARY ARTERY DISEASE INVOLVING NATIVE HEART WITHOUT ANGINA PECTORIS, UNSPECIFIED VESSEL OR LESION TYPE: ICD-10-CM

## 2017-08-31 DIAGNOSIS — N18.30 TYPE 2 DIABETES MELLITUS WITH STAGE 3 CHRONIC KIDNEY DISEASE, WITH LONG-TERM CURRENT USE OF INSULIN (HCC): Primary | ICD-10-CM

## 2017-08-31 DIAGNOSIS — Z79.4 TYPE 2 DIABETES MELLITUS WITH STAGE 3 CHRONIC KIDNEY DISEASE, WITH LONG-TERM CURRENT USE OF INSULIN (HCC): Primary | ICD-10-CM

## 2017-08-31 DIAGNOSIS — E11.22 TYPE 2 DIABETES MELLITUS WITH STAGE 3 CHRONIC KIDNEY DISEASE, WITH LONG-TERM CURRENT USE OF INSULIN (HCC): Primary | ICD-10-CM

## 2017-08-31 DIAGNOSIS — I10 ESSENTIAL HYPERTENSION: ICD-10-CM

## 2017-08-31 LAB — HBA1C MFR BLD: 7.2 %

## 2017-08-31 PROCEDURE — 1123F ACP DISCUSS/DSCN MKR DOCD: CPT | Performed by: FAMILY MEDICINE

## 2017-08-31 PROCEDURE — G8598 ASA/ANTIPLAT THER USED: HCPCS | Performed by: FAMILY MEDICINE

## 2017-08-31 PROCEDURE — 1036F TOBACCO NON-USER: CPT | Performed by: FAMILY MEDICINE

## 2017-08-31 PROCEDURE — 4040F PNEUMOC VAC/ADMIN/RCVD: CPT | Performed by: FAMILY MEDICINE

## 2017-08-31 PROCEDURE — 3046F HEMOGLOBIN A1C LEVEL >9.0%: CPT | Performed by: FAMILY MEDICINE

## 2017-08-31 PROCEDURE — 3017F COLORECTAL CA SCREEN DOC REV: CPT | Performed by: FAMILY MEDICINE

## 2017-08-31 PROCEDURE — G8419 CALC BMI OUT NRM PARAM NOF/U: HCPCS | Performed by: FAMILY MEDICINE

## 2017-08-31 PROCEDURE — 99213 OFFICE O/P EST LOW 20 MIN: CPT | Performed by: FAMILY MEDICINE

## 2017-08-31 PROCEDURE — 83036 HEMOGLOBIN GLYCOSYLATED A1C: CPT | Performed by: FAMILY MEDICINE

## 2017-08-31 PROCEDURE — G8427 DOCREV CUR MEDS BY ELIG CLIN: HCPCS | Performed by: FAMILY MEDICINE

## 2017-08-31 ASSESSMENT — ENCOUNTER SYMPTOMS
DIARRHEA: 0
ABDOMINAL PAIN: 0
COUGH: 0
RHINORRHEA: 1
NAUSEA: 0
SORE THROAT: 0
SHORTNESS OF BREATH: 0
CONSTIPATION: 0

## 2017-09-12 RX ORDER — CARVEDILOL 12.5 MG/1
TABLET ORAL
Qty: 180 TABLET | Refills: 1 | Status: SHIPPED | OUTPATIENT
Start: 2017-09-12 | End: 2018-03-08 | Stop reason: SDUPTHER

## 2017-09-12 RX ORDER — NIFEDIPINE 60 MG/1
TABLET, FILM COATED, EXTENDED RELEASE ORAL
Qty: 180 TABLET | Refills: 1 | Status: SHIPPED | OUTPATIENT
Start: 2017-09-12 | End: 2018-03-12 | Stop reason: SDUPTHER

## 2017-09-12 RX ORDER — ISOSORBIDE MONONITRATE 30 MG/1
30 TABLET, EXTENDED RELEASE ORAL 2 TIMES DAILY
Qty: 60 TABLET | Refills: 5 | Status: SHIPPED | OUTPATIENT
Start: 2017-09-12 | End: 2018-03-08 | Stop reason: SDUPTHER

## 2017-10-02 RX ORDER — CLOPIDOGREL BISULFATE 75 MG/1
TABLET ORAL
Qty: 90 TABLET | Refills: 1 | Status: SHIPPED | OUTPATIENT
Start: 2017-10-02 | End: 2018-03-08 | Stop reason: SDUPTHER

## 2017-10-10 RX ORDER — GEMFIBROZIL 600 MG/1
600 TABLET, FILM COATED ORAL DAILY
Qty: 90 TABLET | Refills: 1 | Status: SHIPPED | OUTPATIENT
Start: 2017-10-10 | End: 2018-04-03 | Stop reason: SDUPTHER

## 2017-10-10 NOTE — TELEPHONE ENCOUNTER
Patient needs refill on Gemfibrozil - last check up 08/31/2017 - patient uses Drug [facility] Maintenance   Topic Date Due    AAA screen  1940    Colon cancer screen colonoscopy  10/23/1992    Zostavax vaccine  10/23/2002    Diabetic retinal exam  03/11/2017    Flu vaccine (1) 09/01/2017    Diabetic foot exam  05/22/2018    Lipid screen  06/02/2018    Diabetic microalbuminuria test  08/29/2018    Diabetic hemoglobin A1C test  08/31/2018    DTaP/Tdap/Td vaccine (2 - Td) 08/15/2025    Pneumococcal low/med risk  Completed             (applicable per patient's age: Cancer Screenings, Depression Screening, Fall Risk Screening, Immunizations)    Hemoglobin A1C (%)   Date Value   08/31/2017 7.2   03/10/2017 7.0 (H)   11/22/2016 5.8     Microalb/Crt.  Ratio (mcg/mg creat)   Date Value   08/29/2017 59 (H)     LDL Cholesterol (mg/dL)   Date Value   06/02/2017          AST (U/L)   Date Value   06/02/2017 17     ALT (U/L)   Date Value   06/02/2017 13     BUN (mg/dL)   Date Value   08/29/2017 52 (H)      (goal A1C is < 7)   (goal LDL is <100) need 30-50% reduction from baseline     BP Readings from Last 3 Encounters:   08/31/17 138/70   06/05/17 138/60   05/11/17 (!) 142/70    (goal /80)      All Future Testing planned in [facility]:  Lab Frequency Next Occurrence   CBC Auto Differential Once 07/05/2018   TSH with Reflex Once 07/05/2018   Comprehensive Metabolic Panel Once 67/78/5406   Vitamin D 25 Hydroxy Once 07/05/2018   Magnesium Once 07/05/2018   Lipid Panel Once 07/05/2018   EKG 12 Lead Once 07/05/2018   XR Chest Standard TWO VW Once 07/05/2018       Next Visit Date:  Future Appointments  Date Time Provider Laura Franco   11/21/2017 10:00 AM DO Delfino Cardoza [facility]   5/31/2018 8:30 AM MD Colt Valdez [facility]            Patient Active Problem List:     CAD (coronary artery disease)     DM2 (diabetes mellitus, type 2) ([address]     Hyperlipemia     Shortness of breath

## 2017-11-06 RX ORDER — FAMOTIDINE 20 MG/1
TABLET, FILM COATED ORAL
Qty: 90 TABLET | Refills: 1 | Status: SHIPPED | OUTPATIENT
Start: 2017-11-06 | End: 2018-04-09 | Stop reason: SDUPTHER

## 2017-11-06 RX ORDER — ASPIRIN 325 MG
325 TABLET, DELAYED RELEASE (ENTERIC COATED) ORAL DAILY
Qty: 90 TABLET | Refills: 1 | Status: SHIPPED | OUTPATIENT
Start: 2017-11-06 | End: 2018-04-09 | Stop reason: SDUPTHER

## 2017-11-06 RX ORDER — SIMVASTATIN 40 MG
TABLET ORAL
Qty: 90 TABLET | Refills: 1 | Status: SHIPPED | OUTPATIENT
Start: 2017-11-06 | End: 2018-04-09 | Stop reason: SDUPTHER

## 2017-11-06 NOTE — TELEPHONE ENCOUNTER
Requesting a refill on Famotidine 20 mg, Simvastatin 40 mg and Aspirin 325 mg. Patient last seen 8/31/17. Drug [address] Maintenance   Topic Date Due    AAA screen  1940    Colon cancer screen colonoscopy  10/23/1992    Zostavax vaccine  10/23/2002    Diabetic retinal exam  03/11/2017    Flu vaccine (1) 09/01/2017    Diabetic foot exam  05/22/2018    Lipid screen  06/02/2018    Diabetic hemoglobin A1C test  08/31/2018    DTaP/Tdap/Td vaccine (2 - Td) 08/15/2025    Pneumococcal low/med risk  Completed             (applicable per patient's age: Cancer Screenings, Depression Screening, Fall Risk Screening, Immunizations)    Hemoglobin A1C (%)   Date Value   08/31/2017 7.2   03/10/2017 7.0 (H)   11/22/2016 5.8     Microalb/Crt.  Ratio (mcg/mg creat)   Date Value   08/29/2017 59 (H)     LDL Cholesterol (mg/dL)   Date Value   06/02/2017          AST (U/L)   Date Value   06/02/2017 17     ALT (U/L)   Date Value   06/02/2017 13     BUN (mg/dL)   Date Value   08/29/2017 52 (H)      (goal A1C is < 7)   (goal LDL is <100) need 30-50% reduction from baseline     BP Readings from Last 3 Encounters:   08/31/17 138/70   06/05/17 138/60   05/11/17 (!) 142/70    (goal /80)      All Future Testing planned in [facility]:  Lab Frequency Next Occurrence   CBC Auto Differential Once 07/05/2018   TSH with Reflex Once 07/05/2018   Comprehensive Metabolic Panel Once 92/01/2964   Vitamin D 25 Hydroxy Once 07/05/2018   Magnesium Once 07/05/2018   Lipid Panel Once 07/05/2018   EKG 12 Lead Once 07/05/2018   XR Chest Standard TWO VW Once 07/05/2018       Next Visit Date:  Future Appointments  Date Time Provider Laura Franco   [facility]21[facility]2017 10:00 [facility]   5/31/2018 8:30 AM [facility]            Patient Active Problem List:     CAD (coronary artery disease)     DM2 (diabetes mellitus, type 2) ([address]     Hyperlipemia     Shortness of breath

## 2017-11-21 ENCOUNTER — OFFICE VISIT (OUTPATIENT)
Dept: FAMILY MEDICINE CLINIC | Age: 77
End: 2017-11-21
Payer: MEDICARE

## 2017-11-21 VITALS
DIASTOLIC BLOOD PRESSURE: 70 MMHG | BODY MASS INDEX: 28.89 KG/M2 | OXYGEN SATURATION: 98 % | SYSTOLIC BLOOD PRESSURE: 144 MMHG | HEART RATE: 92 BPM | WEIGHT: 190 LBS

## 2017-11-21 DIAGNOSIS — E11.21 TYPE 2 DIABETES MELLITUS WITH DIABETIC NEPHROPATHY, WITH LONG-TERM CURRENT USE OF INSULIN (HCC): ICD-10-CM

## 2017-11-21 DIAGNOSIS — I25.10 CORONARY ARTERY DISEASE INVOLVING NATIVE CORONARY ARTERY OF NATIVE HEART WITHOUT ANGINA PECTORIS: ICD-10-CM

## 2017-11-21 DIAGNOSIS — I10 ESSENTIAL HYPERTENSION: Primary | ICD-10-CM

## 2017-11-21 DIAGNOSIS — Z79.4 TYPE 2 DIABETES MELLITUS WITH DIABETIC NEPHROPATHY, WITH LONG-TERM CURRENT USE OF INSULIN (HCC): ICD-10-CM

## 2017-11-21 DIAGNOSIS — E78.5 HYPERLIPIDEMIA, UNSPECIFIED HYPERLIPIDEMIA TYPE: ICD-10-CM

## 2017-11-21 PROCEDURE — 99213 OFFICE O/P EST LOW 20 MIN: CPT | Performed by: FAMILY MEDICINE

## 2017-11-21 PROCEDURE — 3045F PR MOST RECENT HEMOGLOBIN A1C LEVEL 7.0-9.0%: CPT | Performed by: FAMILY MEDICINE

## 2017-11-21 PROCEDURE — 4040F PNEUMOC VAC/ADMIN/RCVD: CPT | Performed by: FAMILY MEDICINE

## 2017-11-21 PROCEDURE — G8484 FLU IMMUNIZE NO ADMIN: HCPCS | Performed by: FAMILY MEDICINE

## 2017-11-21 PROCEDURE — 1123F ACP DISCUSS/DSCN MKR DOCD: CPT | Performed by: FAMILY MEDICINE

## 2017-11-21 PROCEDURE — G8417 CALC BMI ABV UP PARAM F/U: HCPCS | Performed by: FAMILY MEDICINE

## 2017-11-21 PROCEDURE — G8427 DOCREV CUR MEDS BY ELIG CLIN: HCPCS | Performed by: FAMILY MEDICINE

## 2017-11-21 PROCEDURE — G8598 ASA/ANTIPLAT THER USED: HCPCS | Performed by: FAMILY MEDICINE

## 2017-11-21 PROCEDURE — 3017F COLORECTAL CA SCREEN DOC REV: CPT | Performed by: FAMILY MEDICINE

## 2017-11-21 PROCEDURE — 1036F TOBACCO NON-USER: CPT | Performed by: FAMILY MEDICINE

## 2017-11-21 RX ORDER — ALLOPURINOL 100 MG/1
TABLET ORAL
Qty: 30 TABLET | Refills: 5
Start: 2017-11-21 | End: 2018-01-01

## 2017-11-21 RX ORDER — BLOOD-GLUCOSE METER
KIT MISCELLANEOUS
Refills: 0 | COMMUNITY
Start: 2017-08-31 | End: 2019-01-01 | Stop reason: SDUPTHER

## 2017-11-21 ASSESSMENT — ENCOUNTER SYMPTOMS
DIARRHEA: 0
ABDOMINAL PAIN: 0
SHORTNESS OF BREATH: 0
COUGH: 0
CONSTIPATION: 0
RHINORRHEA: 1
SORE THROAT: 0

## 2017-11-21 NOTE — PATIENT INSTRUCTIONS
SURVEY:    You may be receiving a survey from [employer] regarding your visit today. Please complete the survey to enable us to provide the highest quality of care to you and your family. If you cannot score us a very good on any question, please call the office to discuss how we could of made your experience a very good one. Thank you.

## 2017-11-21 NOTE — PROGRESS NOTES
Subjective:      Patient ID: Kavita Abdi is a 78 y.o. male. 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 Reports home glucose running in 140's. Feels good overall, no complaints. Medications reviewed. Review of Systems   Constitutional: Negative for chills and fatigue. HENT: Positive for rhinorrhea (mild). Negative for congestion and sore throat. Respiratory: Negative for cough and shortness of breath. Cardiovascular: Negative for chest pain, palpitations and leg swelling. Gastrointestinal: Negative for abdominal pain, constipation and diarrhea. Genitourinary: Negative for difficulty urinating and dysuria. Musculoskeletal: Positive for arthralgias (minor). Negative for joint swelling. Neurological: Negative for dizziness and light-headedness. Psychiatric/Behavioral: Negative for dysphoric mood and sleep disturbance. The patient is not nervous/anxious. Objective:   Physical Exam   Constitutional: He is oriented to person, place, and time. He appears well-developed and well-nourished. HENT:   Head: Normocephalic and atraumatic. Eyes: Conjunctivae are normal. No scleral icterus. Neck: No thyromegaly present. No bruits   Cardiovascular: Normal rate, regular rhythm and intact distal pulses. Murmur heard. Pulmonary/Chest: Effort normal and breath sounds normal. He has no wheezes. Abdominal: Soft. Bowel sounds are normal. There is no tenderness. Musculoskeletal: He exhibits no edema or tenderness. Neurological: He is alert and oriented to person, place, and time. Skin: Skin is warm and dry. Psychiatric: He has a normal mood and affect. His behavior is normal.   Nursing note and vitals reviewed.       Assessment:      Hypertension  Hyperlipidemia  CAD  Type 2 DM      Plan:      Continue all meds  Follow up with Dr Marc Han received

## 2017-12-07 DIAGNOSIS — N18.30 TYPE 2 DIABETES MELLITUS WITH STAGE 3 CHRONIC KIDNEY DISEASE, WITH LONG-TERM CURRENT USE OF INSULIN (HCC): ICD-10-CM

## 2017-12-07 DIAGNOSIS — Z79.4 TYPE 2 DIABETES MELLITUS WITH STAGE 3 CHRONIC KIDNEY DISEASE, WITH LONG-TERM CURRENT USE OF INSULIN (HCC): ICD-10-CM

## 2017-12-07 DIAGNOSIS — E11.22 TYPE 2 DIABETES MELLITUS WITH STAGE 3 CHRONIC KIDNEY DISEASE, WITH LONG-TERM CURRENT USE OF INSULIN (HCC): ICD-10-CM

## 2017-12-28 RX ORDER — FUROSEMIDE 40 MG/1
TABLET ORAL
Qty: 180 TABLET | Refills: 2 | Status: SHIPPED | OUTPATIENT
Start: 2017-12-28 | End: 2018-05-22 | Stop reason: DRUGHIGH

## 2017-12-28 NOTE — TELEPHONE ENCOUNTER
Patient asking for a new script for Furosamide 40 mg - patient uses Drug [facility] - last check up 11/21/17    Health Maintenance   Topic Date Due    AAA screen  1940    Colon cancer screen colonoscopy  10/23/1992    Zostavax vaccine  10/23/2002    Diabetic retinal exam  03/11/2017    Diabetic foot exam  05/22/2018    Lipid screen  06/02/2018    Diabetic hemoglobin A1C test  08/31/2018    DTaP/Tdap/Td vaccine (2 - Td) 08/15/2025    Flu vaccine  Completed    Pneumococcal low/med risk  Completed             (applicable per patient's age: Cancer Screenings, Depression Screening, Fall Risk Screening, Immunizations)    Hemoglobin A1C (%)   Date Value   08/31/2017 7.2   03/10/2017 7.0 (H)   11/22/2016 5.8     Microalb/Crt.  Ratio (mcg/mg creat)   Date Value   08/29/2017 59 (H)     LDL Cholesterol (mg/dL)   Date Value   06/02/2017          AST (U/L)   Date Value   06/02/2017 17     ALT (U/L)   Date Value   06/02/2017 13     BUN (mg/dL)   Date Value   08/29/2017 52 (H)      (goal A1C is < 7)   (goal LDL is <100) need 30-50% reduction from baseline     BP Readings from Last 3 Encounters:   11/21/17 (!) 144/70   08/31/17 138/70   06/05/17 138/60    (goal /80)      All Future Testing planned in [facility]:  Lab Frequency Next Occurrence   CBC Auto Differential Once 07/05/2018   TSH with Reflex Once 07/05/2018   Comprehensive Metabolic Panel Once 57/12/5039   Vitamin D 25 Hydroxy Once 07/05/2018   Magnesium Once 07/05/2018   Lipid Panel Once 07/05/2018   EKG 12 Lead Once 07/05/2018   XR Chest Standard TWO VW Once 07/05/2018       Next Visit Date:  Future Appointments  Date Time Provider Laura Franco   5/22/2018 9:00 AM DO Zonia Garcia [facility]   5/31/2018 8:30 AM MD Marylou Jiang [facility]            Patient Active Problem List:     CAD (coronary artery disease)     DM2 (diabetes mellitus, type 2) ([address]     Hyperlipemia     Shortness of breath

## 2018-01-01 ENCOUNTER — TELEPHONE (OUTPATIENT)
Dept: FAMILY MEDICINE CLINIC | Age: 78
End: 2018-01-01

## 2018-01-01 ENCOUNTER — HOSPITAL ENCOUNTER (INPATIENT)
Age: 78
LOS: 2 days | Discharge: HOME | DRG: 291 | End: 2019-01-01
Attending: INTERNAL MEDICINE | Admitting: INTERNAL MEDICINE
Payer: MEDICARE

## 2018-01-01 ENCOUNTER — HOSPITAL ENCOUNTER (OUTPATIENT)
Dept: NURSING | Age: 78
Setting detail: INFUSION SERIES
Discharge: HOME | End: 2018-08-13
Payer: MEDICARE

## 2018-01-01 ENCOUNTER — APPOINTMENT (OUTPATIENT)
Dept: CT IMAGING | Age: 78
DRG: 291 | End: 2018-01-01
Payer: MEDICARE

## 2018-01-01 ENCOUNTER — HOSPITAL ENCOUNTER (OUTPATIENT)
Age: 78
Discharge: HOME | End: 2018-07-24
Payer: MEDICARE

## 2018-01-01 ENCOUNTER — HOSPITAL ENCOUNTER (OUTPATIENT)
Dept: NURSING | Age: 78
Setting detail: INFUSION SERIES
Discharge: HOME | End: 2018-08-17
Payer: MEDICARE

## 2018-01-01 ENCOUNTER — HOSPITAL ENCOUNTER (OUTPATIENT)
Dept: NURSING | Age: 78
Setting detail: INFUSION SERIES
Discharge: HOME | End: 2018-08-20
Payer: MEDICARE

## 2018-01-01 ENCOUNTER — OFFICE VISIT (OUTPATIENT)
Dept: FAMILY MEDICINE CLINIC | Age: 78
End: 2018-01-01
Payer: MEDICARE

## 2018-01-01 ENCOUNTER — APPOINTMENT (OUTPATIENT)
Dept: GENERAL RADIOLOGY | Age: 78
DRG: 291 | End: 2018-01-01
Payer: MEDICARE

## 2018-01-01 ENCOUNTER — OFFICE VISIT (OUTPATIENT)
Dept: CARDIOLOGY CLINIC | Age: 78
End: 2018-01-01
Payer: MEDICARE

## 2018-01-01 ENCOUNTER — HOSPITAL ENCOUNTER (EMERGENCY)
Age: 78
Discharge: ANOTHER HOSPITAL | End: 2018-10-05
Attending: FAMILY MEDICINE
Payer: MEDICARE

## 2018-01-01 ENCOUNTER — APPOINTMENT (OUTPATIENT)
Dept: GENERAL RADIOLOGY | Age: 78
End: 2018-01-01
Payer: MEDICARE

## 2018-01-01 ENCOUNTER — HOSPITAL ENCOUNTER (OUTPATIENT)
Dept: NURSING | Age: 78
Setting detail: INFUSION SERIES
Discharge: HOME | End: 2018-08-22
Payer: MEDICARE

## 2018-01-01 ENCOUNTER — HOSPITAL ENCOUNTER (OUTPATIENT)
Dept: NURSING | Age: 78
Setting detail: INFUSION SERIES
Discharge: HOME | End: 2018-08-15
Payer: MEDICARE

## 2018-01-01 VITALS
DIASTOLIC BLOOD PRESSURE: 67 MMHG | RESPIRATION RATE: 16 BRPM | HEART RATE: 72 BPM | TEMPERATURE: 97.8 F | SYSTOLIC BLOOD PRESSURE: 164 MMHG

## 2018-01-01 VITALS
HEART RATE: 82 BPM | WEIGHT: 185 LBS | DIASTOLIC BLOOD PRESSURE: 80 MMHG | BODY MASS INDEX: 28.13 KG/M2 | OXYGEN SATURATION: 98 % | SYSTOLIC BLOOD PRESSURE: 170 MMHG

## 2018-01-01 VITALS
HEART RATE: 72 BPM | RESPIRATION RATE: 16 BRPM | DIASTOLIC BLOOD PRESSURE: 72 MMHG | TEMPERATURE: 97.1 F | SYSTOLIC BLOOD PRESSURE: 165 MMHG

## 2018-01-01 VITALS
DIASTOLIC BLOOD PRESSURE: 74 MMHG | HEART RATE: 81 BPM | BODY MASS INDEX: 27.98 KG/M2 | SYSTOLIC BLOOD PRESSURE: 170 MMHG | OXYGEN SATURATION: 98 % | WEIGHT: 184 LBS

## 2018-01-01 VITALS — HEART RATE: 77 BPM | DIASTOLIC BLOOD PRESSURE: 63 MMHG | SYSTOLIC BLOOD PRESSURE: 161 MMHG | RESPIRATION RATE: 16 BRPM

## 2018-01-01 VITALS
OXYGEN SATURATION: 99 % | WEIGHT: 184 LBS | BODY MASS INDEX: 27.98 KG/M2 | DIASTOLIC BLOOD PRESSURE: 60 MMHG | SYSTOLIC BLOOD PRESSURE: 160 MMHG | HEART RATE: 88 BPM

## 2018-01-01 VITALS
HEART RATE: 79 BPM | OXYGEN SATURATION: 98 % | HEIGHT: 68 IN | TEMPERATURE: 98.2 F | WEIGHT: 186.9 LBS | SYSTOLIC BLOOD PRESSURE: 143 MMHG | DIASTOLIC BLOOD PRESSURE: 54 MMHG | RESPIRATION RATE: 19 BRPM | BODY MASS INDEX: 28.33 KG/M2

## 2018-01-01 VITALS
WEIGHT: 187 LBS | TEMPERATURE: 97.9 F | HEIGHT: 68 IN | OXYGEN SATURATION: 96 % | HEART RATE: 62 BPM | SYSTOLIC BLOOD PRESSURE: 138 MMHG | DIASTOLIC BLOOD PRESSURE: 60 MMHG | BODY MASS INDEX: 28.34 KG/M2

## 2018-01-01 VITALS
DIASTOLIC BLOOD PRESSURE: 70 MMHG | WEIGHT: 181 LBS | SYSTOLIC BLOOD PRESSURE: 160 MMHG | BODY MASS INDEX: 27.52 KG/M2 | HEART RATE: 80 BPM | OXYGEN SATURATION: 100 %

## 2018-01-01 VITALS
HEART RATE: 74 BPM | TEMPERATURE: 97.7 F | SYSTOLIC BLOOD PRESSURE: 149 MMHG | DIASTOLIC BLOOD PRESSURE: 59 MMHG | RESPIRATION RATE: 16 BRPM

## 2018-01-01 VITALS
SYSTOLIC BLOOD PRESSURE: 162 MMHG | HEART RATE: 74 BPM | TEMPERATURE: 97.7 F | RESPIRATION RATE: 16 BRPM | DIASTOLIC BLOOD PRESSURE: 61 MMHG

## 2018-01-01 DIAGNOSIS — E55.9 VITAMIN D DEFICIENCY DISEASE: ICD-10-CM

## 2018-01-01 DIAGNOSIS — I10 ESSENTIAL HYPERTENSION: ICD-10-CM

## 2018-01-01 DIAGNOSIS — D50.9 NORMOCYTIC HYPOCHROMIC ANEMIA: ICD-10-CM

## 2018-01-01 DIAGNOSIS — R05.9 COUGH: ICD-10-CM

## 2018-01-01 DIAGNOSIS — R09.02 HYPOXIA: ICD-10-CM

## 2018-01-01 DIAGNOSIS — N18.30 CHRONIC KIDNEY DISEASE, STAGE III (MODERATE) (HCC): ICD-10-CM

## 2018-01-01 DIAGNOSIS — J01.80 ACUTE NON-RECURRENT SINUSITIS OF OTHER SINUS: Primary | ICD-10-CM

## 2018-01-01 DIAGNOSIS — N18.2 TYPE 2 DIABETES MELLITUS WITH STAGE 2 CHRONIC KIDNEY DISEASE, WITH LONG-TERM CURRENT USE OF INSULIN (HCC): ICD-10-CM

## 2018-01-01 DIAGNOSIS — Z79.4 TYPE 2 DIABETES MELLITUS WITH STAGE 2 CHRONIC KIDNEY DISEASE, WITH LONG-TERM CURRENT USE OF INSULIN (HCC): Primary | ICD-10-CM

## 2018-01-01 DIAGNOSIS — R77.8 ELEVATED TROPONIN: ICD-10-CM

## 2018-01-01 DIAGNOSIS — Z79.4 TYPE 2 DIABETES MELLITUS WITH STAGE 2 CHRONIC KIDNEY DISEASE, WITH LONG-TERM CURRENT USE OF INSULIN (HCC): ICD-10-CM

## 2018-01-01 DIAGNOSIS — I25.10 CORONARY ARTERY DISEASE INVOLVING NATIVE CORONARY ARTERY OF NATIVE HEART WITHOUT ANGINA PECTORIS: Primary | ICD-10-CM

## 2018-01-01 DIAGNOSIS — N18.2 TYPE 2 DIABETES MELLITUS WITH STAGE 2 CHRONIC KIDNEY DISEASE, WITH LONG-TERM CURRENT USE OF INSULIN (HCC): Primary | ICD-10-CM

## 2018-01-01 DIAGNOSIS — Z79.4 TYPE 2 DIABETES MELLITUS WITH STAGE 3 CHRONIC KIDNEY DISEASE, WITH LONG-TERM CURRENT USE OF INSULIN (HCC): ICD-10-CM

## 2018-01-01 DIAGNOSIS — I21.4 NSTEMI (NON-ST ELEVATED MYOCARDIAL INFARCTION) (HCC): Primary | ICD-10-CM

## 2018-01-01 DIAGNOSIS — E11.22 TYPE 2 DIABETES MELLITUS WITH STAGE 3 CHRONIC KIDNEY DISEASE, WITH LONG-TERM CURRENT USE OF INSULIN (HCC): ICD-10-CM

## 2018-01-01 DIAGNOSIS — I50.9 ACUTE CONGESTIVE HEART FAILURE, UNSPECIFIED HEART FAILURE TYPE (HCC): Primary | ICD-10-CM

## 2018-01-01 DIAGNOSIS — I25.10 CORONARY ARTERY DISEASE INVOLVING NATIVE CORONARY ARTERY OF NATIVE HEART WITHOUT ANGINA PECTORIS: ICD-10-CM

## 2018-01-01 DIAGNOSIS — E78.2 MIXED HYPERLIPIDEMIA: ICD-10-CM

## 2018-01-01 DIAGNOSIS — I16.0 HYPERTENSIVE URGENCY: Primary | ICD-10-CM

## 2018-01-01 DIAGNOSIS — E11.69 TYPE 2 DIABETES MELLITUS WITH OTHER SPECIFIED COMPLICATION (HCC): ICD-10-CM

## 2018-01-01 DIAGNOSIS — N18.30 TYPE 2 DIABETES MELLITUS WITH STAGE 3 CHRONIC KIDNEY DISEASE, WITH LONG-TERM CURRENT USE OF INSULIN (HCC): ICD-10-CM

## 2018-01-01 DIAGNOSIS — R73.9 HYPERGLYCEMIA: ICD-10-CM

## 2018-01-01 DIAGNOSIS — E11.22 TYPE 2 DIABETES MELLITUS WITH STAGE 2 CHRONIC KIDNEY DISEASE, WITH LONG-TERM CURRENT USE OF INSULIN (HCC): Primary | ICD-10-CM

## 2018-01-01 DIAGNOSIS — E11.22 TYPE 2 DIABETES MELLITUS WITH STAGE 2 CHRONIC KIDNEY DISEASE, WITH LONG-TERM CURRENT USE OF INSULIN (HCC): ICD-10-CM

## 2018-01-01 DIAGNOSIS — N18.30 CHRONIC RENAL INSUFFICIENCY, STAGE 3 (MODERATE) (HCC): ICD-10-CM

## 2018-01-01 LAB
-: NORMAL
ABSOLUTE EOS #: 0 K/UL (ref 0–0.4)
ABSOLUTE EOS #: 0.2 K/UL (ref 0–0.4)
ABSOLUTE EOS #: 0.3 K/UL (ref 0–0.4)
ABSOLUTE EOS #: 0.4 K/UL (ref 0–0.4)
ABSOLUTE IMMATURE GRANULOCYTE: ABNORMAL K/UL (ref 0–0.3)
ABSOLUTE LYMPH #: 0.5 K/UL (ref 1–4.8)
ABSOLUTE LYMPH #: 1.2 K/UL (ref 1–4.8)
ABSOLUTE LYMPH #: 1.3 K/UL (ref 1–4.8)
ABSOLUTE LYMPH #: 1.7 K/UL (ref 1–4.8)
ABSOLUTE MONO #: 0.4 K/UL (ref 0–1)
ABSOLUTE MONO #: 0.8 K/UL (ref 0–1)
ABSOLUTE MONO #: 0.8 K/UL (ref 0–1)
ABSOLUTE MONO #: 1.1 K/UL (ref 0–1)
ALBUMIN SERPL-MCNC: 4.5 G/DL (ref 3.5–5.2)
ALBUMIN SERPL-MCNC: 4.5 G/DL (ref 3.5–5.2)
ALBUMIN SERPL-MCNC: 4.8 G/DL (ref 3.5–5.2)
ALBUMIN/GLOBULIN RATIO: ABNORMAL (ref 1–2.5)
ALBUMIN/GLOBULIN RATIO: ABNORMAL (ref 1–2.5)
ALP BLD-CCNC: 56 U/L (ref 40–129)
ALP BLD-CCNC: 62 U/L (ref 40–129)
ALT SERPL-CCNC: 11 U/L (ref 5–41)
ALT SERPL-CCNC: 11 U/L (ref 5–41)
AMORPHOUS: NORMAL
ANION GAP SERPL CALCULATED.3IONS-SCNC: 16 MMOL/L (ref 9–17)
ANION GAP SERPL CALCULATED.3IONS-SCNC: 18 MMOL/L (ref 9–17)
AST SERPL-CCNC: 14 U/L
AST SERPL-CCNC: 15 U/L
BACTERIA: NORMAL
BASOPHILS # BLD: 0 % (ref 0–2)
BASOPHILS # BLD: 1 % (ref 0–2)
BASOPHILS ABSOLUTE: 0 K/UL (ref 0–0.2)
BASOPHILS ABSOLUTE: 0.1 K/UL (ref 0–0.2)
BILIRUB SERPL-MCNC: 0.36 MG/DL (ref 0.3–1.2)
BILIRUB SERPL-MCNC: 0.53 MG/DL (ref 0.3–1.2)
BILIRUBIN URINE: NEGATIVE
BNP INTERPRETATION: ABNORMAL
BNP INTERPRETATION: ABNORMAL
BUN BLDV-MCNC: 27 MG/DL (ref 8–23)
BUN BLDV-MCNC: 36 MG/DL (ref 8–23)
BUN BLDV-MCNC: 37 MG/DL (ref 8–23)
BUN BLDV-MCNC: 37 MG/DL (ref 8–23)
BUN BLDV-MCNC: 41 MG/DL (ref 8–23)
BUN/CREAT BLD: 20 (ref 9–20)
BUN/CREAT BLD: 23 (ref 9–20)
BUN/CREAT BLD: 24 (ref 9–20)
BUN/CREAT BLD: 26 (ref 9–20)
CALCIUM SERPL-MCNC: 10 MG/DL (ref 8.6–10.4)
CALCIUM SERPL-MCNC: 10 MG/DL (ref 8.6–10.4)
CALCIUM SERPL-MCNC: 10.1 MG/DL (ref 8.6–10.4)
CALCIUM SERPL-MCNC: 10.3 MG/DL (ref 8.6–10.4)
CALCIUM SERPL-MCNC: 10.7 MG/DL (ref 8.6–10.4)
CASTS UA: NORMAL /LPF
CHLORIDE BLD-SCNC: 102 MMOL/L (ref 98–107)
CHLORIDE BLD-SCNC: 102 MMOL/L (ref 98–107)
CHLORIDE BLD-SCNC: 103 MMOL/L (ref 98–107)
CHLORIDE BLD-SCNC: 96 MMOL/L (ref 98–107)
CHLORIDE BLD-SCNC: 98 MMOL/L (ref 98–107)
CHOLESTEROL/HDL RATIO: 2.6
CHOLESTEROL/HDL RATIO: 3.2
CHOLESTEROL: 123 MG/DL
CHOLESTEROL: 139 MG/DL
CO2: 20 MMOL/L (ref 20–31)
CO2: 20 MMOL/L (ref 20–31)
CO2: 21 MMOL/L (ref 20–31)
CO2: 21 MMOL/L (ref 20–31)
CO2: 22 MMOL/L (ref 20–31)
COLOR: YELLOW
COMMENT UA: ABNORMAL
CREAT SERPL-MCNC: 1.36 MG/DL (ref 0.7–1.2)
CREAT SERPL-MCNC: 1.52 MG/DL (ref 0.7–1.2)
CREAT SERPL-MCNC: 1.52 MG/DL (ref 0.7–1.2)
CREAT SERPL-MCNC: 1.55 MG/DL (ref 0.7–1.2)
CREAT SERPL-MCNC: 1.56 MG/DL (ref 0.7–1.2)
CRYSTALS, UA: NORMAL /HPF
DIFFERENTIAL TYPE: YES
DIRECT EXAM: NORMAL
DIRECT EXAM: NORMAL
EKG ATRIAL RATE: 101 BPM
EKG ATRIAL RATE: 76 BPM
EKG ATRIAL RATE: 88 BPM
EKG P AXIS: 107 DEGREES
EKG P AXIS: 40 DEGREES
EKG P AXIS: 67 DEGREES
EKG P-R INTERVAL: 224 MS
EKG P-R INTERVAL: 238 MS
EKG P-R INTERVAL: 244 MS
EKG Q-T INTERVAL: 356 MS
EKG Q-T INTERVAL: 382 MS
EKG Q-T INTERVAL: 402 MS
EKG QRS DURATION: 106 MS
EKG QRS DURATION: 110 MS
EKG QRS DURATION: 112 MS
EKG QTC CALCULATION (BAZETT): 452 MS
EKG QTC CALCULATION (BAZETT): 461 MS
EKG QTC CALCULATION (BAZETT): 462 MS
EKG R AXIS: 27 DEGREES
EKG R AXIS: 3 DEGREES
EKG R AXIS: 78 DEGREES
EKG T AXIS: 102 DEGREES
EKG T AXIS: 130 DEGREES
EKG T AXIS: 54 DEGREES
EKG VENTRICULAR RATE: 101 BPM
EKG VENTRICULAR RATE: 76 BPM
EKG VENTRICULAR RATE: 88 BPM
EOSINOPHILS RELATIVE PERCENT: 0 % (ref 0–5)
EOSINOPHILS RELATIVE PERCENT: 1 % (ref 0–5)
EOSINOPHILS RELATIVE PERCENT: 4 % (ref 0–5)
EOSINOPHILS RELATIVE PERCENT: 5 % (ref 0–5)
EPITHELIAL CELLS UA: NORMAL /HPF
ESTIMATED AVERAGE GLUCOSE: 154 MG/DL
FERRITIN: 80 UG/L (ref 30–400)
FIO2: 28
GFR AFRICAN AMERICAN: 53 ML/MIN
GFR AFRICAN AMERICAN: 53 ML/MIN
GFR AFRICAN AMERICAN: 54 ML/MIN
GFR AFRICAN AMERICAN: 54 ML/MIN
GFR AFRICAN AMERICAN: >60 ML/MIN
GFR NON-AFRICAN AMERICAN: 44 ML/MIN
GFR NON-AFRICAN AMERICAN: 44 ML/MIN
GFR NON-AFRICAN AMERICAN: 45 ML/MIN
GFR NON-AFRICAN AMERICAN: 45 ML/MIN
GFR NON-AFRICAN AMERICAN: 51 ML/MIN
GFR SERPL CREATININE-BSD FRML MDRD: ABNORMAL ML/MIN/{1.73_M2}
GLUCOSE BLD-MCNC: 218 MG/DL (ref 70–99)
GLUCOSE BLD-MCNC: 223 MG/DL (ref 65–99)
GLUCOSE BLD-MCNC: 224 MG/DL (ref 65–99)
GLUCOSE BLD-MCNC: 230 MG/DL (ref 65–99)
GLUCOSE BLD-MCNC: 265 MG/DL (ref 70–99)
GLUCOSE BLD-MCNC: 275 MG/DL (ref 70–99)
GLUCOSE BLD-MCNC: 281 MG/DL (ref 65–99)
GLUCOSE BLD-MCNC: 287 MG/DL (ref 65–99)
GLUCOSE BLD-MCNC: 312 MG/DL (ref 65–99)
GLUCOSE BLD-MCNC: 412 MG/DL (ref 65–99)
GLUCOSE BLD-MCNC: 479 MG/DL (ref 70–99)
GLUCOSE URINE: ABNORMAL
HBA1C MFR BLD: 7 % (ref 4.8–5.9)
HBA1C MFR BLD: 7.2 %
HCT VFR BLD CALC: 32.6 % (ref 41–53)
HCT VFR BLD CALC: 33.6 % (ref 41–53)
HCT VFR BLD CALC: 33.6 % (ref 41–53)
HCT VFR BLD CALC: 35.8 % (ref 41–53)
HCT VFR BLD CALC: 39.3 % (ref 41–53)
HDLC SERPL-MCNC: 39 MG/DL
HDLC SERPL-MCNC: 53 MG/DL
HEMOGLOBIN: 10.9 G/DL (ref 13.5–17.5)
HEMOGLOBIN: 11.4 G/DL (ref 13.5–17.5)
HEMOGLOBIN: 11.4 G/DL (ref 13.5–17.5)
HEMOGLOBIN: 12 G/DL (ref 13.5–17.5)
HEMOGLOBIN: 13.2 G/DL (ref 13.5–17.5)
IMMATURE GRANULOCYTES: ABNORMAL %
INR BLD: 1
IRON SATURATION: 19 % (ref 20–55)
IRON: 73 UG/DL (ref 59–158)
KETONES, URINE: NEGATIVE
LACTIC ACID: 1.6 MMOL/L (ref 0.5–2.2)
LDL CHOLESTEROL: 41 MG/DL (ref 0–130)
LDL CHOLESTEROL: 60 MG/DL (ref 0–130)
LEUKOCYTE ESTERASE, URINE: NEGATIVE
LYMPHOCYTES # BLD: 16 % (ref 13–44)
LYMPHOCYTES # BLD: 23 % (ref 13–44)
LYMPHOCYTES # BLD: 7 % (ref 13–44)
LYMPHOCYTES # BLD: 7 % (ref 13–44)
Lab: NORMAL
MAGNESIUM: 2 MG/DL (ref 1.6–2.6)
MAGNESIUM: 2.3 MG/DL (ref 1.6–2.6)
MCH RBC QN AUTO: 28.5 PG (ref 26–34)
MCH RBC QN AUTO: 28.8 PG (ref 26–34)
MCH RBC QN AUTO: 29.6 PG (ref 26–34)
MCH RBC QN AUTO: 29.8 PG (ref 26–34)
MCHC RBC AUTO-ENTMCNC: 33.4 G/DL (ref 31–37)
MCHC RBC AUTO-ENTMCNC: 33.5 G/DL (ref 31–37)
MCHC RBC AUTO-ENTMCNC: 33.5 G/DL (ref 31–37)
MCHC RBC AUTO-ENTMCNC: 33.8 G/DL (ref 31–37)
MCV RBC AUTO: 84.3 FL (ref 80–100)
MCV RBC AUTO: 85.8 FL (ref 80–100)
MCV RBC AUTO: 88.5 FL (ref 80–100)
MCV RBC AUTO: 88.8 FL (ref 80–100)
MONOCYTES # BLD: 10 % (ref 5–9)
MONOCYTES # BLD: 10 % (ref 5–9)
MONOCYTES # BLD: 6 % (ref 5–9)
MONOCYTES # BLD: 7 % (ref 5–9)
MUCUS: NORMAL
NEGATIVE BASE EXCESS, ART: 3 (ref 0–2)
NITRITE, URINE: NEGATIVE
NRBC AUTOMATED: ABNORMAL PER 100 WBC
O2 DEVICE/FLOW/%: ABNORMAL
OTHER OBSERVATIONS UA: NORMAL
PARTIAL THROMBOPLASTIN TIME: 27.5 SEC (ref 21–33)
PATIENT FASTING?: YES
PATIENT TEMP: ABNORMAL
PDW BLD-RTO: 13.6 % (ref 12.1–15.2)
PDW BLD-RTO: 14.1 % (ref 12.1–15.2)
PDW BLD-RTO: 15 % (ref 12.1–15.2)
PDW BLD-RTO: 15.2 % (ref 12.1–15.2)
PH UA: 5 (ref 4–8)
PHOSPHORUS: 3.9 MG/DL (ref 2.5–4.5)
PLATELET # BLD: 315 K/UL (ref 140–450)
PLATELET # BLD: 391 K/UL (ref 140–450)
PLATELET # BLD: 398 K/UL (ref 140–450)
PLATELET # BLD: 416 K/UL (ref 140–450)
PLATELET ESTIMATE: ABNORMAL
PMV BLD AUTO: ABNORMAL FL (ref 6–12)
POC HCO3: 21.1 MMOL/L (ref 22–26)
POC O2 SATURATION: 93 % (ref 95–98)
POC PCO2 TEMP: ABNORMAL MM HG
POC PCO2: 33 MM HG (ref 35–45)
POC PH TEMP: ABNORMAL
POC PH: 7.41 (ref 7.35–7.45)
POC PO2 TEMP: ABNORMAL MM HG
POC PO2: 64 MM HG (ref 80–105)
POSITIVE BASE EXCESS, ART: ABNORMAL (ref 0–2)
POTASSIUM SERPL-SCNC: 3.9 MMOL/L (ref 3.7–5.3)
POTASSIUM SERPL-SCNC: 3.9 MMOL/L (ref 3.7–5.3)
POTASSIUM SERPL-SCNC: 4.2 MMOL/L (ref 3.7–5.3)
POTASSIUM SERPL-SCNC: 4.7 MMOL/L (ref 3.7–5.3)
POTASSIUM SERPL-SCNC: 4.7 MMOL/L (ref 3.7–5.3)
PRO-BNP: 1298 PG/ML
PRO-BNP: 5499 PG/ML
PROTEIN UA: ABNORMAL
PROTHROMBIN TIME: 10.2 SEC (ref 9–11.6)
PTH INTACT: 31.54 PG/ML (ref 15–65)
RBC # BLD: 3.68 M/UL (ref 4.5–5.9)
RBC # BLD: 3.99 M/UL (ref 4.5–5.9)
RBC # BLD: 4.03 M/UL (ref 4.5–5.9)
RBC # BLD: 4.58 M/UL (ref 4.5–5.9)
RBC # BLD: ABNORMAL 10*6/UL
RBC UA: NORMAL /HPF (ref 0–2)
RENAL EPITHELIAL, UA: NORMAL /HPF
SEG NEUTROPHILS: 61 % (ref 39–75)
SEG NEUTROPHILS: 69 % (ref 39–75)
SEG NEUTROPHILS: 85 % (ref 39–75)
SEG NEUTROPHILS: 86 % (ref 39–75)
SEGMENTED NEUTROPHILS ABSOLUTE COUNT: 14.2 K/UL (ref 2.1–6.5)
SEGMENTED NEUTROPHILS ABSOLUTE COUNT: 4.6 K/UL (ref 2.1–6.5)
SEGMENTED NEUTROPHILS ABSOLUTE COUNT: 5.8 K/UL (ref 2.1–6.5)
SEGMENTED NEUTROPHILS ABSOLUTE COUNT: 5.9 K/UL (ref 2.1–6.5)
SODIUM BLD-SCNC: 135 MMOL/L (ref 135–144)
SODIUM BLD-SCNC: 137 MMOL/L (ref 135–144)
SODIUM BLD-SCNC: 140 MMOL/L (ref 135–144)
SODIUM BLD-SCNC: 140 MMOL/L (ref 135–144)
SODIUM BLD-SCNC: 141 MMOL/L (ref 135–144)
SPECIFIC GRAVITY UA: 1.01 (ref 1–1.03)
SPECIMEN DESCRIPTION: NORMAL
STATUS: NORMAL
TCO2 (CALC), ART: 22 MMOL/L (ref 24–30)
THYROXINE, FREE: 1.49 NG/DL (ref 0.93–1.7)
TOTAL IRON BINDING CAPACITY: 375 UG/DL (ref 250–450)
TOTAL PROTEIN: 7.7 G/DL (ref 6.4–8.3)
TOTAL PROTEIN: 7.8 G/DL (ref 6.4–8.3)
TRANSFERRIN: 317 MG/DL (ref 200–360)
TRICHOMONAS: NORMAL
TRIGL SERPL-MCNC: 130 MG/DL
TRIGL SERPL-MCNC: 213 MG/DL
TROPONIN INTERP: ABNORMAL
TROPONIN INTERP: ABNORMAL
TROPONIN INTERP: NORMAL
TROPONIN T: 0.08 NG/ML
TROPONIN T: 0.08 NG/ML
TROPONIN T: <0.03 NG/ML
TROPONIN, HIGH SENSITIVITY: NORMAL NG/L (ref 0–22)
TSH SERPL DL<=0.05 MIU/L-ACNC: 1.44 MIU/L (ref 0.3–5)
TSH SERPL DL<=0.05 MIU/L-ACNC: 7.26 MIU/L (ref 0.3–5)
TURBIDITY: CLEAR
UNSATURATED IRON BINDING CAPACITY: 302 UG/DL (ref 112–347)
URIC ACID: 5.4 MG/DL (ref 3.4–7)
URINE HGB: NEGATIVE
UROBILINOGEN, URINE: NORMAL
VITAMIN D 25-HYDROXY: 34.8 NG/ML (ref 30–100)
VITAMIN D 25-HYDROXY: 34.8 NG/ML (ref 30–100)
VLDLC SERPL CALC-MCNC: ABNORMAL MG/DL (ref 1–30)
VLDLC SERPL CALC-MCNC: NORMAL MG/DL (ref 1–30)
WBC # BLD: 16.7 K/UL (ref 3.5–11)
WBC # BLD: 6.8 K/UL (ref 3.5–11)
WBC # BLD: 7.5 K/UL (ref 3.5–11)
WBC # BLD: 8.4 K/UL (ref 3.5–11)
WBC # BLD: ABNORMAL 10*3/UL
WBC UA: NORMAL /HPF
YEAST: NORMAL

## 2018-01-01 PROCEDURE — 4040F PNEUMOC VAC/ADMIN/RCVD: CPT | Performed by: INTERNAL MEDICINE

## 2018-01-01 PROCEDURE — 81001 URINALYSIS AUTO W/SCOPE: CPT

## 2018-01-01 PROCEDURE — G8598 ASA/ANTIPLAT THER USED: HCPCS | Performed by: FAMILY MEDICINE

## 2018-01-01 PROCEDURE — 84100 ASSAY OF PHOSPHORUS: CPT

## 2018-01-01 PROCEDURE — 4040F PNEUMOC VAC/ADMIN/RCVD: CPT | Performed by: FAMILY MEDICINE

## 2018-01-01 PROCEDURE — 83540 ASSAY OF IRON: CPT

## 2018-01-01 PROCEDURE — G8598 ASA/ANTIPLAT THER USED: HCPCS | Performed by: INTERNAL MEDICINE

## 2018-01-01 PROCEDURE — 80048 BASIC METABOLIC PNL TOTAL CA: CPT

## 2018-01-01 PROCEDURE — 6360000002 HC RX W HCPCS: Performed by: INTERNAL MEDICINE

## 2018-01-01 PROCEDURE — 82947 ASSAY GLUCOSE BLOOD QUANT: CPT

## 2018-01-01 PROCEDURE — G8484 FLU IMMUNIZE NO ADMIN: HCPCS | Performed by: FAMILY MEDICINE

## 2018-01-01 PROCEDURE — G8417 CALC BMI ABV UP PARAM F/U: HCPCS | Performed by: FAMILY MEDICINE

## 2018-01-01 PROCEDURE — 99213 OFFICE O/P EST LOW 20 MIN: CPT | Performed by: FAMILY MEDICINE

## 2018-01-01 PROCEDURE — 80053 COMPREHEN METABOLIC PANEL: CPT

## 2018-01-01 PROCEDURE — 2700000000 HC OXYGEN THERAPY PER DAY

## 2018-01-01 PROCEDURE — 96365 THER/PROPH/DIAG IV INF INIT: CPT

## 2018-01-01 PROCEDURE — G8417 CALC BMI ABV UP PARAM F/U: HCPCS | Performed by: INTERNAL MEDICINE

## 2018-01-01 PROCEDURE — 36415 COLL VENOUS BLD VENIPUNCTURE: CPT

## 2018-01-01 PROCEDURE — 82728 ASSAY OF FERRITIN: CPT

## 2018-01-01 PROCEDURE — G8427 DOCREV CUR MEDS BY ELIG CLIN: HCPCS | Performed by: INTERNAL MEDICINE

## 2018-01-01 PROCEDURE — 84466 ASSAY OF TRANSFERRIN: CPT

## 2018-01-01 PROCEDURE — 2580000003 HC RX 258: Performed by: INTERNAL MEDICINE

## 2018-01-01 PROCEDURE — 2022F DILAT RTA XM EVC RTNOPTHY: CPT | Performed by: INTERNAL MEDICINE

## 2018-01-01 PROCEDURE — 1123F ACP DISCUSS/DSCN MKR DOCD: CPT | Performed by: FAMILY MEDICINE

## 2018-01-01 PROCEDURE — 96366 THER/PROPH/DIAG IV INF ADDON: CPT

## 2018-01-01 PROCEDURE — 83036 HEMOGLOBIN GLYCOSYLATED A1C: CPT | Performed by: FAMILY MEDICINE

## 2018-01-01 PROCEDURE — 71045 X-RAY EXAM CHEST 1 VIEW: CPT

## 2018-01-01 PROCEDURE — 84484 ASSAY OF TROPONIN QUANT: CPT

## 2018-01-01 PROCEDURE — 1101F PT FALLS ASSESS-DOCD LE1/YR: CPT | Performed by: INTERNAL MEDICINE

## 2018-01-01 PROCEDURE — 87804 INFLUENZA ASSAY W/OPTIC: CPT

## 2018-01-01 PROCEDURE — G8427 DOCREV CUR MEDS BY ELIG CLIN: HCPCS | Performed by: FAMILY MEDICINE

## 2018-01-01 PROCEDURE — 82803 BLOOD GASES ANY COMBINATION: CPT

## 2018-01-01 PROCEDURE — 71250 CT THORAX DX C-: CPT

## 2018-01-01 PROCEDURE — 84443 ASSAY THYROID STIM HORMONE: CPT

## 2018-01-01 PROCEDURE — 94761 N-INVAS EAR/PLS OXIMETRY MLT: CPT

## 2018-01-01 PROCEDURE — 3017F COLORECTAL CA SCREEN DOC REV: CPT | Performed by: INTERNAL MEDICINE

## 2018-01-01 PROCEDURE — 85025 COMPLETE CBC W/AUTO DIFF WBC: CPT

## 2018-01-01 PROCEDURE — 2500000003 HC RX 250 WO HCPCS: Performed by: FAMILY MEDICINE

## 2018-01-01 PROCEDURE — 1101F PT FALLS ASSESS-DOCD LE1/YR: CPT | Performed by: FAMILY MEDICINE

## 2018-01-01 PROCEDURE — G8484 FLU IMMUNIZE NO ADMIN: HCPCS | Performed by: INTERNAL MEDICINE

## 2018-01-01 PROCEDURE — 83880 ASSAY OF NATRIURETIC PEPTIDE: CPT

## 2018-01-01 PROCEDURE — 1036F TOBACCO NON-USER: CPT | Performed by: FAMILY MEDICINE

## 2018-01-01 PROCEDURE — 99214 OFFICE O/P EST MOD 30 MIN: CPT | Performed by: INTERNAL MEDICINE

## 2018-01-01 PROCEDURE — 83036 HEMOGLOBIN GLYCOSYLATED A1C: CPT

## 2018-01-01 PROCEDURE — 36600 WITHDRAWAL OF ARTERIAL BLOOD: CPT

## 2018-01-01 PROCEDURE — 96376 TX/PRO/DX INJ SAME DRUG ADON: CPT

## 2018-01-01 PROCEDURE — 85730 THROMBOPLASTIN TIME PARTIAL: CPT

## 2018-01-01 PROCEDURE — 83735 ASSAY OF MAGNESIUM: CPT

## 2018-01-01 PROCEDURE — 83605 ASSAY OF LACTIC ACID: CPT

## 2018-01-01 PROCEDURE — 99214 OFFICE O/P EST MOD 30 MIN: CPT | Performed by: FAMILY MEDICINE

## 2018-01-01 PROCEDURE — 99213 OFFICE O/P EST LOW 20 MIN: CPT | Performed by: INTERNAL MEDICINE

## 2018-01-01 PROCEDURE — 1200000000 HC SEMI PRIVATE

## 2018-01-01 PROCEDURE — 6360000002 HC RX W HCPCS: Performed by: FAMILY MEDICINE

## 2018-01-01 PROCEDURE — 94640 AIRWAY INHALATION TREATMENT: CPT

## 2018-01-01 PROCEDURE — 93005 ELECTROCARDIOGRAM TRACING: CPT

## 2018-01-01 PROCEDURE — G8482 FLU IMMUNIZE ORDER/ADMIN: HCPCS | Performed by: FAMILY MEDICINE

## 2018-01-01 PROCEDURE — 1123F ACP DISCUSS/DSCN MKR DOCD: CPT | Performed by: INTERNAL MEDICINE

## 2018-01-01 PROCEDURE — 84550 ASSAY OF BLOOD/URIC ACID: CPT

## 2018-01-01 PROCEDURE — 82306 VITAMIN D 25 HYDROXY: CPT

## 2018-01-01 PROCEDURE — 1036F TOBACCO NON-USER: CPT | Performed by: INTERNAL MEDICINE

## 2018-01-01 PROCEDURE — 99285 EMERGENCY DEPT VISIT HI MDM: CPT

## 2018-01-01 PROCEDURE — 80061 LIPID PANEL: CPT

## 2018-01-01 PROCEDURE — 3017F COLORECTAL CA SCREEN DOC REV: CPT | Performed by: FAMILY MEDICINE

## 2018-01-01 PROCEDURE — 85610 PROTHROMBIN TIME: CPT

## 2018-01-01 PROCEDURE — 84439 ASSAY OF FREE THYROXINE: CPT

## 2018-01-01 PROCEDURE — 6370000000 HC RX 637 (ALT 250 FOR IP): Performed by: INTERNAL MEDICINE

## 2018-01-01 PROCEDURE — 83550 IRON BINDING TEST: CPT

## 2018-01-01 PROCEDURE — 3045F PR MOST RECENT HEMOGLOBIN A1C LEVEL 7.0-9.0%: CPT | Performed by: INTERNAL MEDICINE

## 2018-01-01 PROCEDURE — 83970 ASSAY OF PARATHORMONE: CPT

## 2018-01-01 PROCEDURE — 6370000000 HC RX 637 (ALT 250 FOR IP): Performed by: FAMILY MEDICINE

## 2018-01-01 PROCEDURE — 87040 BLOOD CULTURE FOR BACTERIA: CPT

## 2018-01-01 PROCEDURE — 82040 ASSAY OF SERUM ALBUMIN: CPT

## 2018-01-01 PROCEDURE — 96375 TX/PRO/DX INJ NEW DRUG ADDON: CPT

## 2018-01-01 RX ORDER — SODIUM CHLORIDE 0.9 % (FLUSH) 0.9 %
10 SYRINGE (ML) INJECTION PRN
Status: CANCELLED | OUTPATIENT
Start: 2018-01-01

## 2018-01-01 RX ORDER — SIMVASTATIN 20 MG
40 TABLET ORAL NIGHTLY
Status: DISCONTINUED | OUTPATIENT
Start: 2018-01-01 | End: 2019-01-01 | Stop reason: HOSPADM

## 2018-01-01 RX ORDER — SODIUM CHLORIDE 9 MG/ML
INJECTION, SOLUTION INTRAVENOUS CONTINUOUS
Status: CANCELLED | OUTPATIENT
Start: 2018-01-01

## 2018-01-01 RX ORDER — FUROSEMIDE 10 MG/ML
40 INJECTION INTRAMUSCULAR; INTRAVENOUS ONCE
Status: COMPLETED | OUTPATIENT
Start: 2018-01-01 | End: 2018-01-01

## 2018-01-01 RX ORDER — GEMFIBROZIL 600 MG/1
600 TABLET, FILM COATED ORAL DAILY
COMMUNITY

## 2018-01-01 RX ORDER — METHYLPREDNISOLONE SODIUM SUCCINATE 125 MG/2ML
125 INJECTION, POWDER, LYOPHILIZED, FOR SOLUTION INTRAMUSCULAR; INTRAVENOUS ONCE
Status: CANCELLED | OUTPATIENT
Start: 2018-01-01 | End: 2018-01-01

## 2018-01-01 RX ORDER — DIPHENHYDRAMINE HYDROCHLORIDE 50 MG/ML
50 INJECTION INTRAMUSCULAR; INTRAVENOUS ONCE
Status: CANCELLED | OUTPATIENT
Start: 2018-01-01 | End: 2018-01-01

## 2018-01-01 RX ORDER — DEXTROSE MONOHYDRATE 25 G/50ML
12.5 INJECTION, SOLUTION INTRAVENOUS PRN
Status: DISCONTINUED | OUTPATIENT
Start: 2018-01-01 | End: 2019-01-01 | Stop reason: HOSPADM

## 2018-01-01 RX ORDER — 0.9 % SODIUM CHLORIDE 0.9 %
10 VIAL (ML) INJECTION ONCE
Status: CANCELLED | OUTPATIENT
Start: 2018-01-01 | End: 2018-01-01

## 2018-01-01 RX ORDER — HYDRALAZINE HYDROCHLORIDE 20 MG/ML
10 INJECTION INTRAMUSCULAR; INTRAVENOUS EVERY 6 HOURS PRN
Status: DISCONTINUED | OUTPATIENT
Start: 2018-01-01 | End: 2019-01-01 | Stop reason: HOSPADM

## 2018-01-01 RX ORDER — ASCORBIC ACID 500 MG
500 TABLET ORAL DAILY
Status: DISCONTINUED | OUTPATIENT
Start: 2018-01-01 | End: 2019-01-01 | Stop reason: HOSPADM

## 2018-01-01 RX ORDER — HEPARIN SODIUM 10000 [USP'U]/100ML
18 INJECTION, SOLUTION INTRAVENOUS CONTINUOUS
Status: DISCONTINUED | OUTPATIENT
Start: 2018-01-01 | End: 2018-01-01

## 2018-01-01 RX ORDER — HYDRALAZINE HYDROCHLORIDE 25 MG/1
50 TABLET, FILM COATED ORAL 3 TIMES DAILY
Status: DISCONTINUED | OUTPATIENT
Start: 2018-01-01 | End: 2019-01-01 | Stop reason: HOSPADM

## 2018-01-01 RX ORDER — SODIUM CHLORIDE 0.9 % (FLUSH) 0.9 %
10 SYRINGE (ML) INJECTION PRN
Status: DISCONTINUED | OUTPATIENT
Start: 2018-01-01 | End: 2018-01-01 | Stop reason: HOSPADM

## 2018-01-01 RX ORDER — IPRATROPIUM BROMIDE AND ALBUTEROL SULFATE 2.5; .5 MG/3ML; MG/3ML
1 SOLUTION RESPIRATORY (INHALATION) ONCE
Status: COMPLETED | OUTPATIENT
Start: 2018-01-01 | End: 2018-01-01

## 2018-01-01 RX ORDER — LOSARTAN POTASSIUM 50 MG/1
100 TABLET ORAL DAILY
Status: DISCONTINUED | OUTPATIENT
Start: 2018-01-01 | End: 2019-01-01 | Stop reason: HOSPADM

## 2018-01-01 RX ORDER — FUROSEMIDE 20 MG/1
20 TABLET ORAL DAILY
Status: ON HOLD | COMMUNITY
End: 2019-01-01 | Stop reason: HOSPADM

## 2018-01-01 RX ORDER — NIFEDIPINE 60 MG/1
TABLET, FILM COATED, EXTENDED RELEASE ORAL
Qty: 180 TABLET | Refills: 1 | Status: SHIPPED | OUTPATIENT
Start: 2018-01-01 | End: 2019-01-01 | Stop reason: SDUPTHER

## 2018-01-01 RX ORDER — SODIUM CHLORIDE 9 MG/ML
INJECTION, SOLUTION INTRAVENOUS ONCE
Status: CANCELLED | OUTPATIENT
Start: 2018-01-01 | End: 2018-01-01

## 2018-01-01 RX ORDER — CARVEDILOL 12.5 MG/1
12.5 TABLET ORAL 2 TIMES DAILY
Status: DISCONTINUED | OUTPATIENT
Start: 2018-01-01 | End: 2019-01-01 | Stop reason: HOSPADM

## 2018-01-01 RX ORDER — LEVOFLOXACIN 5 MG/ML
500 INJECTION, SOLUTION INTRAVENOUS EVERY 24 HOURS
Status: DISCONTINUED | OUTPATIENT
Start: 2018-01-01 | End: 2019-01-01 | Stop reason: HOSPADM

## 2018-01-01 RX ORDER — AMOXICILLIN AND CLAVULANATE POTASSIUM 875; 125 MG/1; MG/1
1 TABLET, FILM COATED ORAL 2 TIMES DAILY
Qty: 20 TABLET | Refills: 0 | Status: SHIPPED | OUTPATIENT
Start: 2018-01-01 | End: 2018-01-01

## 2018-01-01 RX ORDER — ONDANSETRON 2 MG/ML
4 INJECTION INTRAMUSCULAR; INTRAVENOUS EVERY 6 HOURS PRN
Status: DISCONTINUED | OUTPATIENT
Start: 2018-01-01 | End: 2019-01-01 | Stop reason: HOSPADM

## 2018-01-01 RX ORDER — HEPARIN SODIUM 1000 [USP'U]/ML
2000 INJECTION, SOLUTION INTRAVENOUS; SUBCUTANEOUS PRN
Status: DISCONTINUED | OUTPATIENT
Start: 2018-01-01 | End: 2018-01-01 | Stop reason: HOSPADM

## 2018-01-01 RX ORDER — FAMOTIDINE 20 MG/1
TABLET, FILM COATED ORAL
Qty: 90 TABLET | Refills: 1 | Status: SHIPPED | OUTPATIENT
Start: 2018-01-01 | End: 2019-01-01 | Stop reason: SDUPTHER

## 2018-01-01 RX ORDER — CLOPIDOGREL BISULFATE 75 MG/1
TABLET ORAL
Qty: 90 TABLET | Refills: 1 | Status: ON HOLD | OUTPATIENT
Start: 2018-01-01 | End: 2019-01-01 | Stop reason: HOSPADM

## 2018-01-01 RX ORDER — SODIUM CHLORIDE 9 MG/ML
INJECTION, SOLUTION INTRAVENOUS ONCE
Status: COMPLETED | OUTPATIENT
Start: 2018-01-01 | End: 2018-01-01

## 2018-01-01 RX ORDER — ASPIRIN 81 MG/1
324 TABLET, CHEWABLE ORAL ONCE
Status: COMPLETED | OUTPATIENT
Start: 2018-01-01 | End: 2018-01-01

## 2018-01-01 RX ORDER — NIFEDIPINE 30 MG/1
60 TABLET, EXTENDED RELEASE ORAL 2 TIMES DAILY
Status: DISCONTINUED | OUTPATIENT
Start: 2018-01-01 | End: 2019-01-01 | Stop reason: HOSPADM

## 2018-01-01 RX ORDER — SODIUM CHLORIDE 0.9 % (FLUSH) 0.9 %
10 SYRINGE (ML) INJECTION PRN
Status: DISCONTINUED | OUTPATIENT
Start: 2018-01-01 | End: 2019-01-01 | Stop reason: HOSPADM

## 2018-01-01 RX ORDER — METHYLPREDNISOLONE SODIUM SUCCINATE 125 MG/2ML
125 INJECTION, POWDER, LYOPHILIZED, FOR SOLUTION INTRAMUSCULAR; INTRAVENOUS ONCE
Status: COMPLETED | OUTPATIENT
Start: 2018-01-01 | End: 2018-01-01

## 2018-01-01 RX ORDER — FLUTICASONE PROPIONATE 50 MCG
1 SPRAY, SUSPENSION (ML) NASAL DAILY
Qty: 1 BOTTLE | Refills: 4 | Status: SHIPPED | OUTPATIENT
Start: 2018-01-01

## 2018-01-01 RX ORDER — DEXTROSE MONOHYDRATE 50 MG/ML
100 INJECTION, SOLUTION INTRAVENOUS PRN
Status: DISCONTINUED | OUTPATIENT
Start: 2018-01-01 | End: 2019-01-01 | Stop reason: HOSPADM

## 2018-01-01 RX ORDER — FERROUS SULFATE 325(65) MG
325 TABLET ORAL 2 TIMES DAILY
Status: DISCONTINUED | OUTPATIENT
Start: 2018-01-01 | End: 2019-01-01 | Stop reason: HOSPADM

## 2018-01-01 RX ORDER — NICOTINE POLACRILEX 4 MG
15 LOZENGE BUCCAL PRN
Status: DISCONTINUED | OUTPATIENT
Start: 2018-01-01 | End: 2019-01-01 | Stop reason: HOSPADM

## 2018-01-01 RX ORDER — HEPARIN SODIUM 1000 [USP'U]/ML
4000 INJECTION, SOLUTION INTRAVENOUS; SUBCUTANEOUS PRN
Status: DISCONTINUED | OUTPATIENT
Start: 2018-01-01 | End: 2018-01-01 | Stop reason: HOSPADM

## 2018-01-01 RX ORDER — GEMFIBROZIL 600 MG/1
600 TABLET, FILM COATED ORAL DAILY
Status: DISCONTINUED | OUTPATIENT
Start: 2018-01-01 | End: 2019-01-01 | Stop reason: HOSPADM

## 2018-01-01 RX ORDER — LOSARTAN POTASSIUM 100 MG/1
TABLET ORAL
Qty: 90 TABLET | Refills: 1 | Status: ON HOLD | OUTPATIENT
Start: 2018-01-01 | End: 2019-01-01 | Stop reason: HOSPADM

## 2018-01-01 RX ORDER — FUROSEMIDE 10 MG/ML
20 INJECTION INTRAMUSCULAR; INTRAVENOUS DAILY
Status: DISCONTINUED | OUTPATIENT
Start: 2018-01-01 | End: 2019-01-01

## 2018-01-01 RX ORDER — HYDRALAZINE HYDROCHLORIDE 25 MG/1
25 TABLET, FILM COATED ORAL
COMMUNITY
End: 2019-01-01 | Stop reason: SDUPTHER

## 2018-01-01 RX ORDER — SODIUM CHLORIDE 0.9 % (FLUSH) 0.9 %
10 SYRINGE (ML) INJECTION EVERY 12 HOURS SCHEDULED
Status: DISCONTINUED | OUTPATIENT
Start: 2018-01-01 | End: 2019-01-01 | Stop reason: HOSPADM

## 2018-01-01 RX ORDER — HYDRALAZINE HYDROCHLORIDE 50 MG/1
TABLET, FILM COATED ORAL
Qty: 60 TABLET | Refills: 11 | Status: SHIPPED | OUTPATIENT
Start: 2018-01-01 | End: 2018-01-01 | Stop reason: ALTCHOICE

## 2018-01-01 RX ORDER — FLUTICASONE PROPIONATE 50 MCG
1 SPRAY, SUSPENSION (ML) NASAL DAILY
Status: DISCONTINUED | OUTPATIENT
Start: 2018-01-01 | End: 2019-01-01 | Stop reason: HOSPADM

## 2018-01-01 RX ORDER — M-VIT,TX,IRON,MINS/CALC/FOLIC 27MG-0.4MG
1 TABLET ORAL
Status: DISCONTINUED | OUTPATIENT
Start: 2018-01-01 | End: 2019-01-01 | Stop reason: HOSPADM

## 2018-01-01 RX ORDER — FAMOTIDINE 20 MG/1
20 TABLET, FILM COATED ORAL DAILY
Status: DISCONTINUED | OUTPATIENT
Start: 2018-01-01 | End: 2019-01-01 | Stop reason: HOSPADM

## 2018-01-01 RX ORDER — ISOSORBIDE MONONITRATE 30 MG/1
30 TABLET, EXTENDED RELEASE ORAL 2 TIMES DAILY
Qty: 180 TABLET | Refills: 1 | Status: SHIPPED | OUTPATIENT
Start: 2018-01-01 | End: 2019-01-01 | Stop reason: SDUPTHER

## 2018-01-01 RX ORDER — LABETALOL HYDROCHLORIDE 5 MG/ML
20 INJECTION, SOLUTION INTRAVENOUS ONCE
Status: COMPLETED | OUTPATIENT
Start: 2018-01-01 | End: 2018-01-01

## 2018-01-01 RX ORDER — ELECTROLYTES/DEXTROSE
1 SOLUTION, ORAL ORAL DAILY
Status: DISCONTINUED | OUTPATIENT
Start: 2018-01-01 | End: 2018-01-01

## 2018-01-01 RX ORDER — ISOSORBIDE MONONITRATE 30 MG/1
30 TABLET, EXTENDED RELEASE ORAL 2 TIMES DAILY
Status: DISCONTINUED | OUTPATIENT
Start: 2018-01-01 | End: 2019-01-01 | Stop reason: HOSPADM

## 2018-01-01 RX ORDER — CARVEDILOL 12.5 MG/1
TABLET ORAL
Qty: 180 TABLET | Refills: 1 | Status: SHIPPED | OUTPATIENT
Start: 2018-01-01 | End: 2019-01-01 | Stop reason: SDUPTHER

## 2018-01-01 RX ORDER — FUROSEMIDE 40 MG/1
40 TABLET ORAL DAILY
COMMUNITY
End: 2018-01-01 | Stop reason: ALTCHOICE

## 2018-01-01 RX ORDER — GEMFIBROZIL 600 MG/1
600 TABLET, FILM COATED ORAL DAILY
Qty: 90 TABLET | Refills: 1 | Status: SHIPPED | OUTPATIENT
Start: 2018-01-01 | End: 2018-01-01 | Stop reason: CLARIF

## 2018-01-01 RX ORDER — CLOPIDOGREL BISULFATE 75 MG/1
75 TABLET ORAL DAILY
Status: DISCONTINUED | OUTPATIENT
Start: 2018-01-01 | End: 2019-01-01 | Stop reason: HOSPADM

## 2018-01-01 RX ORDER — SIMVASTATIN 40 MG
TABLET ORAL
Qty: 90 TABLET | Refills: 1 | Status: SHIPPED | OUTPATIENT
Start: 2018-01-01

## 2018-01-01 RX ORDER — FUROSEMIDE 10 MG/ML
40 INJECTION INTRAMUSCULAR; INTRAVENOUS 2 TIMES DAILY
Status: DISCONTINUED | OUTPATIENT
Start: 2018-01-01 | End: 2018-01-01

## 2018-01-01 RX ORDER — HEPARIN SODIUM 1000 [USP'U]/ML
4000 INJECTION, SOLUTION INTRAVENOUS; SUBCUTANEOUS ONCE
Status: COMPLETED | OUTPATIENT
Start: 2018-01-01 | End: 2018-01-01

## 2018-01-01 RX ORDER — ASPIRIN 325 MG
325 TABLET, DELAYED RELEASE (ENTERIC COATED) ORAL DAILY
Qty: 90 TABLET | Refills: 1 | Status: SHIPPED | OUTPATIENT
Start: 2018-01-01 | End: 2019-01-01 | Stop reason: ALTCHOICE

## 2018-01-01 RX ADMIN — IRON SUCROSE 200 MG: 20 INJECTION, SOLUTION INTRAVENOUS at 09:07

## 2018-01-01 RX ADMIN — Medication 40 UNITS: at 20:53

## 2018-01-01 RX ADMIN — HYDRALAZINE HYDROCHLORIDE 50 MG: 25 TABLET, FILM COATED ORAL at 21:42

## 2018-01-01 RX ADMIN — ISOSORBIDE MONONITRATE 30 MG: 30 TABLET, EXTENDED RELEASE ORAL at 09:46

## 2018-01-01 RX ADMIN — FERROUS SULFATE TAB 325 MG (65 MG ELEMENTAL FE) 325 MG: 325 (65 FE) TAB at 21:42

## 2018-01-01 RX ADMIN — Medication 10 ML: at 09:15

## 2018-01-01 RX ADMIN — IPRATROPIUM BROMIDE AND ALBUTEROL SULFATE 1 AMPULE: .5; 3 SOLUTION RESPIRATORY (INHALATION) at 17:58

## 2018-01-01 RX ADMIN — FAMOTIDINE 20 MG: 20 TABLET ORAL at 09:46

## 2018-01-01 RX ADMIN — HEPARIN SODIUM 4000 UNITS: 1000 INJECTION, SOLUTION INTRAVENOUS; SUBCUTANEOUS at 18:59

## 2018-01-01 RX ADMIN — FERROUS SULFATE TAB 325 MG (65 MG ELEMENTAL FE) 325 MG: 325 (65 FE) TAB at 09:46

## 2018-01-01 RX ADMIN — FUROSEMIDE 40 MG: 10 INJECTION, SOLUTION INTRAMUSCULAR; INTRAVENOUS at 21:42

## 2018-01-01 RX ADMIN — NIFEDIPINE 60 MG: 30 TABLET, FILM COATED, EXTENDED RELEASE ORAL at 21:42

## 2018-01-01 RX ADMIN — IRON SUCROSE 200 MG: 20 INJECTION, SOLUTION INTRAVENOUS at 09:19

## 2018-01-01 RX ADMIN — SIMVASTATIN 40 MG: 20 TABLET, FILM COATED ORAL at 21:42

## 2018-01-01 RX ADMIN — Medication 10 ML: at 20:29

## 2018-01-01 RX ADMIN — Medication 10 ML: at 21:42

## 2018-01-01 RX ADMIN — Medication 10 ML: at 17:46

## 2018-01-01 RX ADMIN — INSULIN LISPRO 4 UNITS: 100 INJECTION, SOLUTION INTRAVENOUS; SUBCUTANEOUS at 16:19

## 2018-01-01 RX ADMIN — HYDRALAZINE HYDROCHLORIDE 50 MG: 25 TABLET, FILM COATED ORAL at 20:30

## 2018-01-01 RX ADMIN — FUROSEMIDE 20 MG: 10 INJECTION, SOLUTION INTRAMUSCULAR; INTRAVENOUS at 09:45

## 2018-01-01 RX ADMIN — Medication 10 ML: at 09:45

## 2018-01-01 RX ADMIN — INSULIN LISPRO 6 UNITS: 100 INJECTION, SOLUTION INTRAVENOUS; SUBCUTANEOUS at 11:39

## 2018-01-01 RX ADMIN — CARVEDILOL 12.5 MG: 12.5 TABLET, FILM COATED ORAL at 20:29

## 2018-01-01 RX ADMIN — FUROSEMIDE 40 MG: 10 INJECTION, SOLUTION INTRAMUSCULAR; INTRAVENOUS at 17:43

## 2018-01-01 RX ADMIN — MULTIPLE VITAMINS W/ MINERALS TAB 1 TABLET: TAB at 21:42

## 2018-01-01 RX ADMIN — ASPIRIN 324 MG: 81 TABLET, CHEWABLE ORAL at 19:00

## 2018-01-01 RX ADMIN — SODIUM CHLORIDE: 9 INJECTION, SOLUTION INTRAVENOUS at 09:06

## 2018-01-01 RX ADMIN — IRON SUCROSE 200 MG: 20 INJECTION, SOLUTION INTRAVENOUS at 09:20

## 2018-01-01 RX ADMIN — INSULIN LISPRO 3 UNITS: 100 INJECTION, SOLUTION INTRAVENOUS; SUBCUTANEOUS at 21:48

## 2018-01-01 RX ADMIN — HEPARIN SODIUM 1000 UNITS/HR: 10000 INJECTION, SOLUTION INTRAVENOUS at 19:04

## 2018-01-01 RX ADMIN — CLOPIDOGREL BISULFATE 75 MG: 75 TABLET ORAL at 09:46

## 2018-01-01 RX ADMIN — SODIUM CHLORIDE: 9 INJECTION, SOLUTION INTRAVENOUS at 09:19

## 2018-01-01 RX ADMIN — GEMFIBROZIL 600 MG: 600 TABLET ORAL at 09:46

## 2018-01-01 RX ADMIN — CARVEDILOL 12.5 MG: 12.5 TABLET, FILM COATED ORAL at 21:42

## 2018-01-01 RX ADMIN — LEVOFLOXACIN 500 MG: 5 INJECTION, SOLUTION INTRAVENOUS at 18:16

## 2018-01-01 RX ADMIN — SODIUM CHLORIDE: 9 INJECTION, SOLUTION INTRAVENOUS at 09:13

## 2018-01-01 RX ADMIN — CARVEDILOL 12.5 MG: 12.5 TABLET, FILM COATED ORAL at 09:46

## 2018-01-01 RX ADMIN — NIFEDIPINE 60 MG: 30 TABLET, FILM COATED, EXTENDED RELEASE ORAL at 20:52

## 2018-01-01 RX ADMIN — IRON SUCROSE 200 MG: 20 INJECTION, SOLUTION INTRAVENOUS at 09:40

## 2018-01-01 RX ADMIN — INSULIN LISPRO 8 UNITS: 100 INJECTION, SOLUTION INTRAVENOUS; SUBCUTANEOUS at 08:29

## 2018-01-01 RX ADMIN — INSULIN LISPRO 6 UNITS: 100 INJECTION, SOLUTION INTRAVENOUS; SUBCUTANEOUS at 06:00

## 2018-01-01 RX ADMIN — IRON SUCROSE 200 MG: 20 INJECTION, SOLUTION INTRAVENOUS at 09:13

## 2018-01-01 RX ADMIN — LOSARTAN POTASSIUM 100 MG: 50 TABLET ORAL at 09:46

## 2018-01-01 RX ADMIN — LEVOFLOXACIN 500 MG: 5 INJECTION, SOLUTION INTRAVENOUS at 17:46

## 2018-01-01 RX ADMIN — MULTIPLE VITAMINS W/ MINERALS TAB 1 TABLET: TAB at 16:19

## 2018-01-01 RX ADMIN — HYDRALAZINE HYDROCHLORIDE 50 MG: 25 TABLET, FILM COATED ORAL at 09:46

## 2018-01-01 RX ADMIN — Medication 40 UNITS: at 21:48

## 2018-01-01 RX ADMIN — OXYCODONE HYDROCHLORIDE AND ACETAMINOPHEN 500 MG: 500 TABLET ORAL at 09:46

## 2018-01-01 RX ADMIN — Medication 50 UNITS: at 08:29

## 2018-01-01 RX ADMIN — METHYLPREDNISOLONE SODIUM SUCCINATE 125 MG: 125 INJECTION, POWDER, FOR SOLUTION INTRAMUSCULAR; INTRAVENOUS at 17:40

## 2018-01-01 RX ADMIN — ENOXAPARIN SODIUM 30 MG: 30 INJECTION SUBCUTANEOUS at 09:46

## 2018-01-01 RX ADMIN — NIFEDIPINE 60 MG: 30 TABLET, FILM COATED, EXTENDED RELEASE ORAL at 09:52

## 2018-01-01 RX ADMIN — FERROUS SULFATE TAB 325 MG (65 MG ELEMENTAL FE) 325 MG: 325 (65 FE) TAB at 20:29

## 2018-01-01 RX ADMIN — LABETALOL HYDROCHLORIDE 20 MG: 5 INJECTION, SOLUTION INTRAVENOUS at 15:54

## 2018-01-01 RX ADMIN — SODIUM CHLORIDE: 9 INJECTION, SOLUTION INTRAVENOUS at 09:05

## 2018-01-01 RX ADMIN — INSULIN LISPRO 2 UNITS: 100 INJECTION, SOLUTION INTRAVENOUS; SUBCUTANEOUS at 20:53

## 2018-01-01 RX ADMIN — SODIUM CHLORIDE: 9 INJECTION, SOLUTION INTRAVENOUS at 09:00

## 2018-01-01 RX ADMIN — Medication 10 ML: at 09:04

## 2018-01-01 RX ADMIN — ISOSORBIDE MONONITRATE 30 MG: 30 TABLET, EXTENDED RELEASE ORAL at 20:29

## 2018-01-01 RX ADMIN — HYDRALAZINE HYDROCHLORIDE 50 MG: 25 TABLET, FILM COATED ORAL at 14:12

## 2018-01-01 RX ADMIN — ISOSORBIDE MONONITRATE 30 MG: 30 TABLET, EXTENDED RELEASE ORAL at 21:42

## 2018-01-01 RX ADMIN — SIMVASTATIN 40 MG: 20 TABLET, FILM COATED ORAL at 20:29

## 2018-01-01 RX ADMIN — ASPIRIN 325 MG: 325 TABLET, DELAYED RELEASE ORAL at 09:46

## 2018-01-01 ASSESSMENT — ENCOUNTER SYMPTOMS
CONSTIPATION: 0
FACIAL SWELLING: 0
EYE REDNESS: 0
COUGH: 0
BLURRED VISION: 0
ABDOMINAL PAIN: 0
EYE DISCHARGE: 0
RHINORRHEA: 0
SHORTNESS OF BREATH: 0
COUGH: 1
FACIAL SWELLING: 0
VOMITING: 0
VOMITING: 0
EYE REDNESS: 0
DIARRHEA: 0
ABDOMINAL PAIN: 0
TROUBLE SWALLOWING: 0
COUGH: 0
VOMITING: 0
VISUAL CHANGE: 0
DIARRHEA: 0
TROUBLE SWALLOWING: 0
TROUBLE SWALLOWING: 0
EYE DISCHARGE: 0
EYE DISCHARGE: 0
BLOOD IN STOOL: 0
SORE THROAT: 0
CONSTIPATION: 0
NAUSEA: 0
SHORTNESS OF BREATH: 0
BLOOD IN STOOL: 0
EYE REDNESS: 0
DIARRHEA: 0
NAUSEA: 0
SHORTNESS OF BREATH: 0
FACIAL SWELLING: 0
ABDOMINAL PAIN: 0

## 2018-01-01 ASSESSMENT — PAIN SCALES - GENERAL
PAINLEVEL_OUTOF10: 0

## 2018-01-08 RX ORDER — GLIPIZIDE 5 MG/1
TABLET ORAL
Qty: 90 TABLET | Refills: 2 | Status: SHIPPED | OUTPATIENT
Start: 2018-01-08 | End: 2018-01-01 | Stop reason: CLARIF

## 2018-01-08 RX ORDER — HYDRALAZINE HYDROCHLORIDE 25 MG/1
TABLET, FILM COATED ORAL
Qty: 270 TABLET | Refills: 2 | Status: SHIPPED | OUTPATIENT
Start: 2018-01-08 | End: 2018-01-01 | Stop reason: SDUPTHER

## 2018-01-08 NOTE — TELEPHONE ENCOUNTER
Patient needs refills on     Hydralazine 25 mg    Glipizide 5 mg    Please send to Brittany Pederson in [facility] Maintenance   Topic Date Due    AAA screen  1940    Colon cancer screen colonoscopy  10/23/1992    Zostavax vaccine  10/23/2002    Diabetic retinal exam  03/11/2017    Diabetic foot exam  05/22/2018    Lipid screen  06/02/2018    Potassium monitoring  06/02/2018    Creatinine monitoring  06/02/2018    A1C test (Diabetic or Prediabetic)  08/31/2018    DTaP/Tdap/Td vaccine (2 - Td) 08/15/2025    Flu vaccine  Completed    Pneumococcal low/med risk  Completed             (applicable per patient's age: Cancer Screenings, Depression Screening, Fall Risk Screening, Immunizations)    Hemoglobin A1C (%)   Date Value   08/31/2017 7.2   03/10/2017 7.0 (H)   11/22/2016 5.8     Microalb/Crt.  Ratio (mcg/mg creat)   Date Value   08/29/2017 59 (H)     LDL Cholesterol (mg/dL)   Date Value   06/02/2017          AST (U/L)   Date Value   06/02/2017 17     ALT (U/L)   Date Value   06/02/2017 13     BUN (mg/dL)   Date Value   08/29/2017 52 (H)      (goal A1C is < 7)   (goal LDL is <100) need 30-50% reduction from baseline     BP Readings from Last 3 Encounters:   11/21/17 (!) 144/70   08/31/17 138/70   06/05/17 138/60    (goal /80)      All Future Testing planned in [facility]:  Lab Frequency Next Occurrence   CBC Auto Differential Once 07/05/2018   TSH with Reflex Once 07/05/2018   Comprehensive Metabolic Panel Once 78/12/5640   Vitamin D 25 Hydroxy Once 07/05/2018   Magnesium Once 07/05/2018   Lipid Panel Once 07/05/2018   EKG 12 Lead Once 07/05/2018   XR Chest Standard TWO VW Once 07/05/2018       Next Visit Date:  Future Appointments  Date Time Provider Laura Franco   5/22/2018 9:00 AM DO Ludwin Javier [employer] [facility]   5/31/2018 8:30 AM MD Ilda Palacio [facility]            Patient Active Problem List:     CAD (coronary artery disease)     DM2 (diabetes mellitus, type 2) ([facility]

## 2018-02-21 ENCOUNTER — HOSPITAL ENCOUNTER (OUTPATIENT)
Age: 78
Discharge: HOME | End: 2018-02-21
Payer: MEDICARE

## 2018-02-21 LAB
ALBUMIN SERPL-MCNC: 4.6 G/DL (ref 3.5–5.2)
ANION GAP SERPL CALCULATED.3IONS-SCNC: 19 MMOL/L (ref 9–17)
BUN BLDV-MCNC: 39 MG/DL (ref 8–23)
BUN/CREAT BLD: 27 (ref 9–20)
CALCIUM SERPL-MCNC: 10.2 MG/DL (ref 8.6–10.4)
CHLORIDE BLD-SCNC: 99 MMOL/L (ref 98–107)
CO2: 22 MMOL/L (ref 20–31)
CREAT SERPL-MCNC: 1.46 MG/DL (ref 0.7–1.2)
CREATININE URINE: 58.2 MG/DL (ref 39–259)
FERRITIN: 57 UG/L (ref 30–400)
GFR AFRICAN AMERICAN: 57 ML/MIN
GFR NON-AFRICAN AMERICAN: 47 ML/MIN
GFR SERPL CREATININE-BSD FRML MDRD: ABNORMAL ML/MIN/{1.73_M2}
GFR SERPL CREATININE-BSD FRML MDRD: ABNORMAL ML/MIN/{1.73_M2}
GLUCOSE BLD-MCNC: 215 MG/DL (ref 70–99)
HCT VFR BLD CALC: 35.5 % (ref 41–53)
HEMOGLOBIN: 11.8 G/DL (ref 13.5–17.5)
IRON SATURATION: 23 % (ref 20–55)
IRON: 85 UG/DL (ref 59–158)
MAGNESIUM: 2.2 MG/DL (ref 1.6–2.6)
PHOSPHORUS: 3.5 MG/DL (ref 2.5–4.5)
POTASSIUM SERPL-SCNC: 4.1 MMOL/L (ref 3.7–5.3)
SODIUM BLD-SCNC: 140 MMOL/L (ref 135–144)
TOTAL IRON BINDING CAPACITY: 375 UG/DL (ref 250–450)
TOTAL PROTEIN, URINE: 39 MG/DL
TRANSFERRIN: 310 MG/DL (ref 200–360)
UNSATURATED IRON BINDING CAPACITY: 290 UG/DL (ref 112–347)
URIC ACID: 8.5 MG/DL (ref 3.4–7)
URINE TOTAL PROTEIN CREATININE RATIO: 0.67 (ref 0–0.2)

## 2018-02-21 PROCEDURE — 82728 ASSAY OF FERRITIN: CPT

## 2018-02-21 PROCEDURE — 82570 ASSAY OF URINE CREATININE: CPT

## 2018-02-21 PROCEDURE — 85018 HEMOGLOBIN: CPT

## 2018-02-21 PROCEDURE — 80069 RENAL FUNCTION PANEL: CPT

## 2018-02-21 PROCEDURE — 83735 ASSAY OF MAGNESIUM: CPT

## 2018-02-21 PROCEDURE — 36415 COLL VENOUS BLD VENIPUNCTURE: CPT

## 2018-02-21 PROCEDURE — 83540 ASSAY OF IRON: CPT

## 2018-02-21 PROCEDURE — 84550 ASSAY OF BLOOD/URIC ACID: CPT

## 2018-02-21 PROCEDURE — 84466 ASSAY OF TRANSFERRIN: CPT

## 2018-02-21 PROCEDURE — 85014 HEMATOCRIT: CPT

## 2018-02-21 PROCEDURE — 84156 ASSAY OF PROTEIN URINE: CPT

## 2018-02-21 PROCEDURE — 83550 IRON BINDING TEST: CPT

## 2018-02-22 LAB — PTH INTACT: 72.89 PG/ML (ref 15–65)

## 2018-02-22 PROCEDURE — 83970 ASSAY OF PARATHORMONE: CPT

## 2018-02-22 PROCEDURE — 36415 COLL VENOUS BLD VENIPUNCTURE: CPT

## 2018-03-08 RX ORDER — CLOPIDOGREL BISULFATE 75 MG/1
TABLET ORAL
Qty: 90 TABLET | Refills: 1 | Status: SHIPPED | OUTPATIENT
Start: 2018-03-08 | End: 2018-01-01 | Stop reason: SDUPTHER

## 2018-03-08 RX ORDER — CARVEDILOL 12.5 MG/1
TABLET ORAL
Qty: 180 TABLET | Refills: 1 | Status: SHIPPED | OUTPATIENT
Start: 2018-03-08 | End: 2018-01-01 | Stop reason: SDUPTHER

## 2018-03-08 RX ORDER — ISOSORBIDE MONONITRATE 30 MG/1
30 TABLET, EXTENDED RELEASE ORAL 2 TIMES DAILY
Qty: 60 TABLET | Refills: 5 | Status: SHIPPED | OUTPATIENT
Start: 2018-03-08 | End: 2018-01-01 | Stop reason: SDUPTHER

## 2018-03-12 RX ORDER — NIFEDIPINE 60 MG/1
TABLET, FILM COATED, EXTENDED RELEASE ORAL
Qty: 180 TABLET | Refills: 1 | Status: SHIPPED | OUTPATIENT
Start: 2018-03-12 | End: 2018-01-01 | Stop reason: SDUPTHER

## 2018-04-03 RX ORDER — GEMFIBROZIL 600 MG/1
600 TABLET, FILM COATED ORAL DAILY
Qty: 90 TABLET | Refills: 1 | Status: SHIPPED | OUTPATIENT
Start: 2018-04-03 | End: 2018-01-01 | Stop reason: SDUPTHER

## 2018-04-09 RX ORDER — SIMVASTATIN 40 MG
TABLET ORAL
Qty: 90 TABLET | Refills: 1 | Status: CANCELLED | OUTPATIENT
Start: 2018-04-09

## 2018-04-09 RX ORDER — FAMOTIDINE 20 MG/1
TABLET, FILM COATED ORAL
Qty: 90 TABLET | Refills: 1 | Status: CANCELLED | OUTPATIENT
Start: 2018-04-09

## 2018-04-09 RX ORDER — ASPIRIN 325 MG
325 TABLET, DELAYED RELEASE (ENTERIC COATED) ORAL DAILY
Qty: 90 TABLET | Refills: 1 | Status: CANCELLED | OUTPATIENT
Start: 2018-04-09

## 2018-04-09 RX ORDER — FAMOTIDINE 20 MG/1
TABLET, FILM COATED ORAL
Qty: 90 TABLET | Refills: 1 | Status: SHIPPED | OUTPATIENT
Start: 2018-04-09 | End: 2018-01-01 | Stop reason: SDUPTHER

## 2018-04-09 RX ORDER — ASPIRIN 325 MG
325 TABLET, DELAYED RELEASE (ENTERIC COATED) ORAL DAILY
Qty: 90 TABLET | Refills: 1 | Status: SHIPPED | OUTPATIENT
Start: 2018-04-09 | End: 2018-01-01 | Stop reason: SDUPTHER

## 2018-04-09 RX ORDER — SIMVASTATIN 40 MG
TABLET ORAL
Qty: 90 TABLET | Refills: 1 | Status: SHIPPED | OUTPATIENT
Start: 2018-04-09 | End: 2018-01-01 | Stop reason: SDUPTHER

## 2018-05-22 ENCOUNTER — OFFICE VISIT (OUTPATIENT)
Dept: FAMILY MEDICINE CLINIC | Age: 78
End: 2018-05-22
Payer: MEDICARE

## 2018-05-22 VITALS
SYSTOLIC BLOOD PRESSURE: 142 MMHG | HEART RATE: 72 BPM | WEIGHT: 190 LBS | OXYGEN SATURATION: 97 % | DIASTOLIC BLOOD PRESSURE: 68 MMHG | BODY MASS INDEX: 28.89 KG/M2

## 2018-05-22 DIAGNOSIS — E11.21 TYPE 2 DIABETES MELLITUS WITH DIABETIC NEPHROPATHY, WITH LONG-TERM CURRENT USE OF INSULIN (HCC): ICD-10-CM

## 2018-05-22 DIAGNOSIS — E78.5 HYPERLIPIDEMIA, UNSPECIFIED HYPERLIPIDEMIA TYPE: ICD-10-CM

## 2018-05-22 DIAGNOSIS — E11.21 DIABETES MELLITUS WITH NEPHROPATHY (HCC): ICD-10-CM

## 2018-05-22 DIAGNOSIS — I10 ESSENTIAL HYPERTENSION: ICD-10-CM

## 2018-05-22 DIAGNOSIS — Z79.4 TYPE 2 DIABETES MELLITUS WITH DIABETIC NEPHROPATHY, WITH LONG-TERM CURRENT USE OF INSULIN (HCC): ICD-10-CM

## 2018-05-22 DIAGNOSIS — E11.21 TYPE II DIABETES MELLITUS WITH NEPHROPATHY (HCC): ICD-10-CM

## 2018-05-22 LAB — HBA1C MFR BLD: 7.2 %

## 2018-05-22 PROCEDURE — 99213 OFFICE O/P EST LOW 20 MIN: CPT | Performed by: FAMILY MEDICINE

## 2018-05-22 PROCEDURE — 3017F COLORECTAL CA SCREEN DOC REV: CPT | Performed by: FAMILY MEDICINE

## 2018-05-22 PROCEDURE — G8427 DOCREV CUR MEDS BY ELIG CLIN: HCPCS | Performed by: FAMILY MEDICINE

## 2018-05-22 PROCEDURE — G8598 ASA/ANTIPLAT THER USED: HCPCS | Performed by: FAMILY MEDICINE

## 2018-05-22 PROCEDURE — 83036 HEMOGLOBIN GLYCOSYLATED A1C: CPT | Performed by: FAMILY MEDICINE

## 2018-05-22 PROCEDURE — 4040F PNEUMOC VAC/ADMIN/RCVD: CPT | Performed by: FAMILY MEDICINE

## 2018-05-22 PROCEDURE — 1036F TOBACCO NON-USER: CPT | Performed by: FAMILY MEDICINE

## 2018-05-22 PROCEDURE — G8417 CALC BMI ABV UP PARAM F/U: HCPCS | Performed by: FAMILY MEDICINE

## 2018-05-22 PROCEDURE — 2022F DILAT RTA XM EVC RTNOPTHY: CPT | Performed by: FAMILY MEDICINE

## 2018-05-22 PROCEDURE — 1123F ACP DISCUSS/DSCN MKR DOCD: CPT | Performed by: FAMILY MEDICINE

## 2018-05-22 PROCEDURE — 3045F PR MOST RECENT HEMOGLOBIN A1C LEVEL 7.0-9.0%: CPT | Performed by: FAMILY MEDICINE

## 2018-05-22 RX ORDER — FUROSEMIDE 40 MG/1
TABLET ORAL
Qty: 180 TABLET | Refills: 2 | Status: SHIPPED
Start: 2018-05-22 | End: 2018-01-01 | Stop reason: CLARIF

## 2018-05-22 RX ORDER — FEBUXOSTAT 40 MG/1
TABLET ORAL
Refills: 5 | COMMUNITY
Start: 2018-05-03 | End: 2018-01-01 | Stop reason: ALTCHOICE

## 2018-05-22 RX ORDER — BENZOCAINE/MENTHOL 6 MG-10 MG
LOZENGE MUCOUS MEMBRANE
Qty: 1 EACH | Refills: 0 | Status: SHIPPED | OUTPATIENT
Start: 2018-05-22

## 2018-05-22 ASSESSMENT — PATIENT HEALTH QUESTIONNAIRE - PHQ9
1. LITTLE INTEREST OR PLEASURE IN DOING THINGS: 0
2. FEELING DOWN, DEPRESSED OR HOPELESS: 0
SUM OF ALL RESPONSES TO PHQ9 QUESTIONS 1 & 2: 0
SUM OF ALL RESPONSES TO PHQ QUESTIONS 1-9: 0

## 2018-05-22 ASSESSMENT — ENCOUNTER SYMPTOMS
WHEEZING: 0
SINUS PAIN: 0
SORE THROAT: 0
RHINORRHEA: 0
BACK PAIN: 0
SINUS PRESSURE: 0
CONSTIPATION: 0
DIARRHEA: 0
ABDOMINAL PAIN: 0
COUGH: 1

## 2018-07-05 NOTE — TELEPHONE ENCOUNTER
Medication pending
with stage 2 chronic kidney disease, with long-term current use of insulin (HCC)     Hyperlipemia     Shortness of breath

## 2018-07-30 NOTE — LETTER
endocrine, hematologic and allergic/immunologic, and these were all negative. PHYSICAL EXAMINATION:  VITAL SIGNS:  His blood pressure was 160/60 with a heart rate of 88 and regular. Respiratory rate 18. O2 saturation 98%. Weight 184 pounds. GENERAL:  He is a pleasant 74-year-old gentleman. Denied pain. He was oriented to person, place and time. Answered questions appropriately. SKIN:  No unusual skin changes. HEENT:  The pupils are equally round and intact. Mucous membranes were dry. NECK:  No JVD. Good carotid pulses. No carotid bruits. No lymphadenopathy or thyromegaly. CARDIOVASCULAR EXAM:  S1 and S2 were normal.  No S3 or S4. Soft systolic blowing type murmur. No diastolic murmur. PMI was normal.  No lift, thrust, or pericardial friction rub. LUNGS:  Quite clear to auscultation and percussion. ABDOMEN:  Soft and nontender. Good bowel sounds. The aorta was not enlarged. No hepatomegaly, splenomegaly. EXTREMITIES:  Good femoral pulses. Good pedal pulses. No pedal edema. Skin was warm and dry. No calf tenderness. Nail beds pink. Good cap refill. PULSES:  Bilateral symmetrical radial, brachial and carotid pulses. No carotid bruits. Good femoral and pedal pulses. NEUROLOGIC EXAM:  Within normal limits. PSYCHIATRIC EXAM:  Within normal limits. LABORATORY DATA:  From 07/24, sodium 140, potassium 4.7, BUN was 37, creatinine 1.52, his GFR was 45 down slightly from 47, calcium was 10.0. Cholesterol 123 with an HDL 39, LDL 41, triglycerides 213. ALT was 11, AST was 15. Parathyroid hormone was 31.54. TSH 7.26 with a normal free thyroxine. Vitamin D 34.8. White count 8.4 with a hemoglobin 8.4 and a platelet count of [phone]. EKG showed sinus rhythm with nonspecific ST changes. Chest x-ray was unremarkable. IMPRESSION:  1. Severe CAD.   2.  Bypass surgery in 2008 at [facility]. [facility] with [facility] to the LAD, a

## 2018-08-03 PROBLEM — N18.30 CHRONIC KIDNEY DISEASE, STAGE III (MODERATE) (HCC): Status: ACTIVE | Noted: 2018-01-01

## 2018-08-03 PROBLEM — D50.9 NORMOCYTIC HYPOCHROMIC ANEMIA: Status: ACTIVE | Noted: 2018-01-01

## 2018-08-03 NOTE — PROGRESS NOTES
From what I am seeing this patient is a Medicare primary patient. We will need to have this compliance check prior to administering. I tried to call the patient to double check insurance coverage and the number listed is not a good number.

## 2018-08-13 NOTE — PROGRESS NOTES
Infusion completed without complaint. Patient refuses to stay for 30 minute observation. Wishes to go home. IV removed and discharged in stable condition. Will return in 2 days for second infusion.

## 2018-08-13 NOTE — PROGRESS NOTES
patient arrives with wife for infusion. No specific complaints today. IV inserted in rt arm for infusion.

## 2018-08-17 NOTE — PROGRESS NOTES
infusion completed without problem. Patient refuses to stay for observation. IV removed and discharged in stable condition.

## 2018-08-20 NOTE — PROGRESS NOTES
Infusion complete without difficulty. Patient refuses to stay for 30 minute observation, education given. IV removed and patient discharged in stable condition.

## 2018-08-22 NOTE — PROGRESS NOTES
Infusion complete. No c/o offered. Pt refuses to stay for 30 minute observation. IV removed and pt discharged ambulatory in stable condition with wife.

## 2018-09-10 NOTE — TELEPHONE ENCOUNTER
(coronary artery disease)     Type 2 diabetes mellitus with stage 2 chronic kidney disease, with long-term current use of insulin (HCC)     Hyperlipemia     Shortness of breath     Normocytic hypochromic anemia     Chronic kidney disease, stage III (moderate)

## 2018-09-17 NOTE — TELEPHONE ENCOUNTER
Medication pending
artery disease)     Type 2 diabetes mellitus with stage 2 chronic kidney disease, with long-term current use of insulin (HCC)     Hyperlipemia     Shortness of breath     Normocytic hypochromic anemia     Chronic kidney disease, stage III (moderate)

## 2018-10-05 NOTE — ED NOTES
Feels that he can hear heart beating in right ear. Comes and goes and is present now.       Bela Sharma RN  10/05/18 0102

## 2018-10-06 NOTE — ED PROVIDER NOTES
tablet by mouth 2 times daily, Disp-180 tablet, R-1Normal      carvedilol (COREG) 12.5 MG tablet TAKE 1 TABLET BY MOUTH 2 TIMES DAILY. , Disp-180 tablet, R-1Normal      NIFEdipine (ADALAT CC) 60 MG extended release tablet TAKE 1 TABLET BY MOUTH TWICE DAILY, Disp-180 tablet, R-1Normal      clopidogrel (PLAVIX) 75 MG tablet TAKE 1 TABLET BY MOUTH DAILY. , Disp-90 tablet, R-1Normal      losartan (COZAAR) 100 MG tablet TAKE 1 TABLET BY MOUTH EVERY DAY, Disp-90 tablet, R-1Normal      insulin 70-30 (HUMULIN 70/30) (70-30) 100 UNIT per ML injection vial INJECT 50 UNITS EVERY MORNING and INJECT 40 UNITS EVERY EVENING, Disp-3 vial, R-6Normal      fluticasone (FLONASE) 50 MCG/ACT nasal spray 1 spray by Nasal route daily Both nostrils daily, Disp-1 Bottle, R-4Normal      simvastatin (ZOCOR) 40 MG tablet TAKE 1 TABLET BY MOUTH NIGHTLY, Disp-90 tablet, R-1Normal      famotidine (PEPCID) 20 MG tablet TAKE 1 TABLET BY MOUTH DAILY. , Disp-90 tablet, R-1Normal      aspirin 325 MG EC tablet Take 1 tablet by mouth daily, Disp-90 tablet, R-1Normal      metFORMIN (GLUCOPHAGE) 1000 MG tablet TAKE 1 TABLET BY MOUTH 2 TIMES DAILY (WITH MEALS). , Disp-180 tablet, R-1Normal      glipiZIDE (GLUCOTROL) 5 MG tablet TAKE 1/2 (ONE-HALF) OF A TABLET BY MOUTH TWICE DAILY. , Disp-90 tablet, R-2Normal      hydrALAZINE (APRESOLINE) 25 MG tablet TAKE 1 TABLET BY MOUTH THREE TIMES DAILY. , Disp-270 tablet, R-2Normal      ferrous sulfate 325 (65 FE) MG tablet Take 325 mg by mouth 2 times daily Historical Med      !! Multiple Vitamins-Minerals (HAIR/SKIN/NAILS/BIOTIN) TABS Take 1 tablet by mouth Daily with supperHistorical Med      Ascorbic Acid (VITAMIN C) 500 MG tablet Take 500 mg by mouth daily      !!  Multiple Vitamins-Minerals (MULTIVITAMIN ADULT PO) Take 1 tablet by mouth daily Historical Med      Blood Pressure Monitor MISC Disp-1 each, R-0, NormalPlease dispense blood pressure monitor per patient's insurance      Blood Glucose Monitoring Suppl (ONE TOUCH ULTRA MINI) w/Device KIT R-0, Historical Med      Insulin Syringe-Needle U-100 (B-D INS SYR ULTRAFINE 1CC/30G) 30G X 1/2\" 1 ML MISC Disp-180 each, R-10, NormalUse 2 daily   DX: E11.9      Blood Glucose Monitoring Suppl WENDY ONCE Starting 2017, Disp-1 Device, R-0, Normal      glucose blood VI test strips (ASCENSIA AUTODISC VI;ONE TOUCH ULTRA TEST VI) strip Disp-200 each, R-10, NormalOne touch ultra strips. Test blood sugar BID,DIAGNOIS:SM TYPE II      ONE TOUCH ULTRASOFT LANCETS MISC Disp-100 each, R-3, NormalTest blood sugar twice daily       ! ! - Potential duplicate medications found. Please discuss with provider. ALLERGIES     is allergic to lisinopril. FAMILY HISTORY     indicated that his mother is . He indicated that his father is . He indicated that his sister is alive. family history includes Heart Attack in his father and mother. SOCIAL HISTORY      reports that he quit smoking about 49 years ago. His smoking use included Cigarettes. He has a 0.80 pack-year smoking history. He has never used smokeless tobacco. He reports that he does not drink alcohol or use drugs. PHYSICAL EXAM     INITIAL VITALS:  height is 5' 8\" (1.727 m) and weight is 186 lb 14.4 oz (84.8 kg). His oral temperature is 98.2 °F (36.8 °C). His blood pressure is 143/54 (abnormal) and his pulse is 79. His respiration is 19 and oxygen saturation is 98%. Physical Exam   Constitutional: Patient is oriented to person, place, and time. Patient appears well-developed and well-nourished. Patient is active and cooperative. HENT:   Head: Normocephalic and atraumatic. Head is without contusion. Right Ear: Hearing and external ear normal. No drainage. Clear TM   Left Ear: Hearing and external ear normal. No drainage. Clear TM  Nose: Nose normal. No nasal deformity. No epistaxis. Mouth/Throat: Mucous membranes are slight dry. Eyes: EOMI.  Conjunctivae, sclera, and lids are normal. Right eye exhibits no discharge. Left eye exhibits no discharge. Neck: Full passive range of motion without pain and phonation normal.  Negative JVD  Cardiovascular:   Normal rate, regular rhythm and intact distal pulses. No murmurs, rubs, or gallops. No edema. Pulses: Radial pulse is 2+   Pulmonary/Chest: Effort normal.   No tachypnea and no bradypnea. No wheezes, rhonchi, or rales. Abdominal: Soft. Patient without distension or tenderness  Musculoskeletal:   Negative acute trauma or deformity,  apparent full range of motion and normal strength all extremities appropriate to age. Neurological: Patient is alert and oriented to person, place, and time. patient displays no tremor. Patient displays no seizure activity. Skin: Skin is warm and dry. Patient is not diaphoretic. Psychiatric: Patient has a normal mood and affect. Patient speech is normal and behavior is normal. Cognition and memory are normal.      DIFFERENTIAL DIAGNOSIS:   Hypertensive urgency, medication noncompliance, and STEMI    DIAGNOSTIC RESULTS     EKG: All EKG's are interpreted by the Emergency Department Physician who either signs or Co-signs this chart in the absence of a cardiologist.  EKG    The patient had an EKG which is interpreted by me in the absence of a Cardiologist.   [] Without comparison to previous. [x] With comparison to a previous EKG Dated 8/24/217    EKG @ 1449 hrs - fast tachycardia with occasional PVC rate 101, normal intervals and normal axis, do of some concern for downsloping ST waves in V3V6    EKG @ 1702 hrs - sinus rhythm first-degree AV block rate 76.  244 QRS QTc normal normal axis continued ST downsloping slightly more prominent than prior EKG      RADIOLOGY: non-plain film images(s) such as CT, Ultrasound and MRI are read by the radiologist.  XR CHEST PORTABLE   Final Result      1. Mild cardiomegaly. 2. Nonacute portable chest with mild chronic, degenerative, and postoperative    changes.              LABS: (+1), normal (0)  Risk Factors: >3 (+2), 1-2 (+1), none (0)  (Risk Factors include HTN, HLD, DM, tobacco, FHx, obesity)    Major Adverse Cardiac Events (MACE) stratification  0-3: 0.9-1.7% risk  4-6: 12-16.6% risk  >7: 50-65% risk    BENEDICT score:7 (4 at baseline)  Age >65  ASA use in past 7 days  2 angina episodes in 24 hours  ST changes of 0.5mm on admission EKG  elevated cardiac markers  known CAD  Risk Factors (3 or more): HTN, HLD, DM, tobacco, FHx, obesity    Score 0-1 = 4.7% risk  Score 2 = 8.3% risk  Score 3 = 13.2% risk  Score 4 = 19.9% risk  Score 5 = 26.2% risk    Chest x-ray as above    Lab workup reviewed    Dr. Jayla Schreiber from cardiology had, or emergency room for another patient, did show an EKG my concerns with the downsloping ST changes and noting troponin 0.08 that I cannot attribute kidney function or other cause, I agree with getting second set EKG troponin at 2 hours    Discussed the patient is overall workup at this time including EKG imaging labs, as well as discussion with his cardiologist, discussed getting 2 hour EKG and lab, acknowledges    EKG #2 as above, second troponin unchanged    Discussed the patient concerns over his cardiac risk factors, as well as his current workup, discussed need for transfer to facility for further cardiac workup as well as cardiology not available at this facility over the weekend, patient acknowledges. After further discussion will contact Daniel    [facility] patient's presentation and current workup, patient accepted for transfer    Heparin drip initiated    Critical Care Time: 35 minutes, including direct patient interaction, discussion with family, review of old records discussion with cardiology    FINAL IMPRESSION      1. NSTEMI (non-ST elevated myocardial infarction) ([address]          DISPOSITION/PLAN   Transfer    PATIENT REFERRED TO:  No follow-up provider specified.     DISCHARGE MEDICATIONS:  Discharge Medication

## 2018-11-02 PROBLEM — E55.9 VITAMIN D DEFICIENCY DISEASE: Status: ACTIVE | Noted: 2018-01-01

## 2018-11-02 NOTE — LETTER
difficulty. His main concern is that of his blood pressure. MEDICATIONS:  His medications are as follows, aspirin 5 grains daily, Coreg 12.5 mg b.i.d., Plavix 75 mg daily, Pepcid 20 mg daily, iron 5 grains b.i.d., Lasix 40 mg daily, Apresoline 25 mg t.i.d., insulin 70/30, 50 units in the morning and 40 units in the evening, Imdur 30 mg b.i.d., Cozaar 100 mg daily, Glucophage 1000 mg b.i.d., Adalat 60 mg b.i.d., Zocor 40 mg daily. PHYSICAL EXAMINATION:   VITAL SIGNS:  His blood pressure today was 160/70 with a heart rate of 80 and regular. Respiratory rate 18. O2 saturation 100%. Weight 181 pounds. GENERAL:  He is a pleasant 79-year-old gentleman. Denied pain. He was oriented to person, place and time. Answered questions appropriately. SKIN:  No unusual skin changes. HEENT:  The pupils are equally round and intact. Mucous membranes were dry. NECK:  No JVD. Good carotid pulses. No carotid bruits. No lymphadenopathy or thyromegaly. CARDIOVASCULAR EXAM:  S1 and S2 were normal.  No S3 or S4. Soft systolic blowing type murmur. No diastolic murmur. PMI was normal.  No lift, thrust, or pericardial friction rub. LUNGS:  Quite clear to auscultation and percussion. ABDOMEN:  Soft and nontender. Good bowel sounds. EXTREMITIES:  Good femoral pulses. Good pedal pulses. No pedal edema. IMPRESSION:  1. Hypertensive urgency with a blood pressure of 200/110.  2.  Severe LV dysfunction, EF of 25%, exacerbated by his hypertension. 3.  Detectable troponins, which I think are secondary to his hypertension rather than a primary cardiac event. 4.  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 5.  Chronic renal insufficiency, about at its baseline with a creatinine in the 1.6 range. PLAN:  1. Increase his Apresoline from 25 mg t.i.d. to 50 mg b.i.d.

## 2018-11-16 NOTE — PROGRESS NOTES
months. 3.  He will call us with his blood pressure over the next several weeks, and we may need to increase his hydralazine to 50 mg t.i.d.    DISCUSSION:  Mr. Wei Kaminski had a hypertensive urgency. His blood pressure was 200/100. We will increase his hydralazine to 50 mg b.i.d. from 25 mg t.i.d. His EF was slightly lower at 25% to 30% on echo and cardiac catheterization respectively. Historically, it has been in the 40% range. However, he did not have any primary ischemic event and I am hoping that with good blood pressure control it would return back to its baseline again. At any rate, he is having no evidence of any congestive heart failure. Thank you very much for allowing me the privilege of seeing Mr. Wei Kaminski. If you have any questions on my thoughts, please do not hesitate to contact me.      Sincerely,        Spencer Tafoya    D: 11/02/2018 12:46:40     T: 11/02/2018 15:35:59     [id number]  Job#: [id number]   Doc#: [id number]    CC:

## 2018-11-19 PROBLEM — I10 ESSENTIAL HYPERTENSION: Status: ACTIVE | Noted: 2018-01-01

## 2018-12-30 PROBLEM — I50.23 ACUTE ON CHRONIC SYSTOLIC CHF (CONGESTIVE HEART FAILURE) (HCC): Status: ACTIVE | Noted: 2018-01-01

## 2019-01-01 ENCOUNTER — HOSPITAL ENCOUNTER (OUTPATIENT)
Dept: CARDIAC REHAB | Age: 79
Setting detail: THERAPIES SERIES
End: 2019-01-01
Payer: MEDICARE

## 2019-01-01 ENCOUNTER — HOSPITAL ENCOUNTER (OUTPATIENT)
Dept: CARDIAC REHAB | Age: 79
Setting detail: THERAPIES SERIES
Discharge: HOME | End: 2019-05-03
Payer: MEDICARE

## 2019-01-01 ENCOUNTER — APPOINTMENT (OUTPATIENT)
Dept: CARDIAC REHAB | Age: 79
End: 2019-01-01
Payer: MEDICARE

## 2019-01-01 ENCOUNTER — HOSPITAL ENCOUNTER (INPATIENT)
Age: 79
LOS: 6 days | Discharge: HOME | DRG: 291 | End: 2019-03-25
Attending: INTERNAL MEDICINE | Admitting: INTERNAL MEDICINE
Payer: MEDICARE

## 2019-01-01 ENCOUNTER — CARE COORDINATION (OUTPATIENT)
Dept: CARE COORDINATION | Age: 79
End: 2019-01-01

## 2019-01-01 ENCOUNTER — CARE COORDINATION (OUTPATIENT)
Dept: CASE MANAGEMENT | Age: 79
End: 2019-01-01

## 2019-01-01 ENCOUNTER — HOSPITAL ENCOUNTER (INPATIENT)
Age: 79
LOS: 6 days | Discharge: HOME | DRG: 291 | End: 2019-05-23
Attending: INTERNAL MEDICINE | Admitting: INTERNAL MEDICINE
Payer: MEDICARE

## 2019-01-01 ENCOUNTER — APPOINTMENT (OUTPATIENT)
Dept: GENERAL RADIOLOGY | Age: 79
DRG: 291 | End: 2019-01-01
Payer: MEDICARE

## 2019-01-01 ENCOUNTER — OFFICE VISIT (OUTPATIENT)
Dept: FAMILY MEDICINE CLINIC | Age: 79
End: 2019-01-01
Payer: MEDICARE

## 2019-01-01 ENCOUNTER — TELEPHONE (OUTPATIENT)
Dept: OTHER | Facility: CLINIC | Age: 79
End: 2019-01-01

## 2019-01-01 ENCOUNTER — HOSPITAL ENCOUNTER (OUTPATIENT)
Dept: CARDIAC REHAB | Age: 79
Setting detail: THERAPIES SERIES
Discharge: HOME | End: 2019-04-24
Payer: MEDICARE

## 2019-01-01 ENCOUNTER — HOSPITAL ENCOUNTER (OUTPATIENT)
Dept: CARDIAC REHAB | Age: 79
Setting detail: THERAPIES SERIES
Discharge: HOME | End: 2019-05-01
Payer: MEDICARE

## 2019-01-01 ENCOUNTER — HOSPITAL ENCOUNTER (OUTPATIENT)
Dept: CARDIAC REHAB | Age: 79
Setting detail: THERAPIES SERIES
Discharge: HOME | End: 2019-05-29
Payer: MEDICARE

## 2019-01-01 ENCOUNTER — HOSPITAL ENCOUNTER (OUTPATIENT)
Dept: CARDIAC REHAB | Age: 79
Setting detail: THERAPIES SERIES
Discharge: HOME | End: 2019-04-22
Payer: MEDICARE

## 2019-01-01 ENCOUNTER — OFFICE VISIT (OUTPATIENT)
Dept: CARDIOLOGY CLINIC | Age: 79
End: 2019-01-01
Payer: MEDICARE

## 2019-01-01 ENCOUNTER — TELEPHONE (OUTPATIENT)
Dept: CARDIOLOGY CLINIC | Age: 79
End: 2019-01-01

## 2019-01-01 ENCOUNTER — HOSPITAL ENCOUNTER (OUTPATIENT)
Dept: CARDIAC REHAB | Age: 79
Setting detail: THERAPIES SERIES
Discharge: HOME | End: 2019-05-06
Payer: MEDICARE

## 2019-01-01 ENCOUNTER — HOSPITAL ENCOUNTER (OUTPATIENT)
Dept: CARDIAC REHAB | Age: 79
Setting detail: THERAPIES SERIES
Discharge: HOME | End: 2019-04-11
Payer: MEDICARE

## 2019-01-01 ENCOUNTER — HOSPITAL ENCOUNTER (OUTPATIENT)
Dept: CARDIAC REHAB | Age: 79
Setting detail: THERAPIES SERIES
Discharge: HOME | End: 2019-03-26
Payer: MEDICARE

## 2019-01-01 ENCOUNTER — HOSPITAL ENCOUNTER (OUTPATIENT)
Dept: NUCLEAR MEDICINE | Age: 79
Discharge: HOME | End: 2019-05-08
Payer: MEDICARE

## 2019-01-01 ENCOUNTER — HOSPITAL ENCOUNTER (EMERGENCY)
Age: 79
Discharge: ANOTHER HOSPITAL | End: 2019-03-19
Attending: FAMILY MEDICINE
Payer: MEDICARE

## 2019-01-01 ENCOUNTER — HOSPITAL ENCOUNTER (INPATIENT)
Age: 79
LOS: 8 days | DRG: 308 | End: 2019-06-02
Attending: FAMILY MEDICINE | Admitting: INTERNAL MEDICINE
Payer: COMMERCIAL

## 2019-01-01 ENCOUNTER — HOSPITAL ENCOUNTER (OUTPATIENT)
Age: 79
Discharge: HOME | End: 2019-04-22
Payer: MEDICARE

## 2019-01-01 ENCOUNTER — HOSPITAL ENCOUNTER (OUTPATIENT)
Age: 79
Discharge: HOME | End: 2019-04-11
Payer: MEDICARE

## 2019-01-01 ENCOUNTER — HOSPITAL ENCOUNTER (OUTPATIENT)
Dept: CARDIAC REHAB | Age: 79
Discharge: HOME | End: 2019-05-24

## 2019-01-01 ENCOUNTER — HOSPITAL ENCOUNTER (EMERGENCY)
Age: 79
Discharge: HOME | End: 2019-01-26
Attending: FAMILY MEDICINE
Payer: MEDICARE

## 2019-01-01 ENCOUNTER — HOSPITAL ENCOUNTER (INPATIENT)
Age: 79
LOS: 8 days | Discharge: NURSING, REHAB OR LONG-TERM CARE | DRG: 291 | End: 2019-05-17
Attending: FAMILY MEDICINE | Admitting: INTERNAL MEDICINE
Payer: MEDICARE

## 2019-01-01 ENCOUNTER — HOSPITAL ENCOUNTER (OUTPATIENT)
Age: 79
Discharge: HOME | End: 2019-05-02
Payer: MEDICARE

## 2019-01-01 ENCOUNTER — HOSPITAL ENCOUNTER (OUTPATIENT)
Age: 79
Discharge: HOME | End: 2019-04-02
Payer: MEDICARE

## 2019-01-01 ENCOUNTER — HOSPITAL ENCOUNTER (OUTPATIENT)
Age: 79
Discharge: HOME | End: 2019-02-04
Payer: MEDICARE

## 2019-01-01 ENCOUNTER — HOSPITAL ENCOUNTER (OUTPATIENT)
Dept: CARDIAC REHAB | Age: 79
Setting detail: THERAPIES SERIES
Discharge: HOME | End: 2019-04-26
Payer: MEDICARE

## 2019-01-01 ENCOUNTER — APPOINTMENT (OUTPATIENT)
Dept: CT IMAGING | Age: 79
DRG: 291 | End: 2019-01-01
Payer: MEDICARE

## 2019-01-01 ENCOUNTER — APPOINTMENT (OUTPATIENT)
Dept: GENERAL RADIOLOGY | Age: 79
End: 2019-01-01
Payer: MEDICARE

## 2019-01-01 ENCOUNTER — HOSPITAL ENCOUNTER (OUTPATIENT)
Dept: CARDIAC REHAB | Age: 79
Setting detail: THERAPIES SERIES
Discharge: HOME | End: 2019-04-17
Payer: MEDICARE

## 2019-01-01 ENCOUNTER — HOSPITAL ENCOUNTER (OUTPATIENT)
Dept: CARDIAC REHAB | Age: 79
Setting detail: THERAPIES SERIES
Discharge: HOME | End: 2019-04-29
Payer: MEDICARE

## 2019-01-01 ENCOUNTER — OFFICE VISIT (OUTPATIENT)
Dept: CARDIOLOGY CLINIC | Age: 79
End: 2019-01-01

## 2019-01-01 ENCOUNTER — TELEPHONE (OUTPATIENT)
Dept: FAMILY MEDICINE CLINIC | Age: 79
End: 2019-01-01

## 2019-01-01 ENCOUNTER — HOSPITAL ENCOUNTER (OUTPATIENT)
Age: 79
Discharge: HOME | End: 2019-03-26
Payer: MEDICARE

## 2019-01-01 ENCOUNTER — HOSPITAL ENCOUNTER (OUTPATIENT)
Dept: CARDIAC REHAB | Age: 79
Setting detail: THERAPIES SERIES
Discharge: HOME | End: 2019-05-08
Payer: MEDICARE

## 2019-01-01 ENCOUNTER — HOSPITAL ENCOUNTER (OUTPATIENT)
Dept: CARDIAC REHAB | Age: 79
Setting detail: THERAPIES SERIES
Discharge: HOME | End: 2019-04-15
Payer: MEDICARE

## 2019-01-01 ENCOUNTER — HOSPITAL ENCOUNTER (OUTPATIENT)
Dept: VASCULAR LAB | Age: 79
Discharge: HOME | End: 2019-03-30
Payer: MEDICARE

## 2019-01-01 ENCOUNTER — HOSPITAL ENCOUNTER (OUTPATIENT)
Dept: CARDIAC REHAB | Age: 79
Setting detail: THERAPIES SERIES
Discharge: HOME | End: 2019-04-12
Payer: MEDICARE

## 2019-01-01 ENCOUNTER — APPOINTMENT (OUTPATIENT)
Dept: ULTRASOUND IMAGING | Age: 79
DRG: 291 | End: 2019-01-01
Attending: INTERNAL MEDICINE
Payer: MEDICARE

## 2019-01-01 VITALS
WEIGHT: 184 LBS | OXYGEN SATURATION: 96 % | DIASTOLIC BLOOD PRESSURE: 60 MMHG | SYSTOLIC BLOOD PRESSURE: 140 MMHG | BODY MASS INDEX: 27.98 KG/M2 | HEART RATE: 70 BPM

## 2019-01-01 VITALS
SYSTOLIC BLOOD PRESSURE: 142 MMHG | TEMPERATURE: 97.9 F | HEIGHT: 68 IN | OXYGEN SATURATION: 94 % | BODY MASS INDEX: 32.21 KG/M2 | WEIGHT: 212.5 LBS | DIASTOLIC BLOOD PRESSURE: 71 MMHG | RESPIRATION RATE: 20 BRPM | HEART RATE: 71 BPM

## 2019-01-01 VITALS
WEIGHT: 191 LBS | SYSTOLIC BLOOD PRESSURE: 130 MMHG | DIASTOLIC BLOOD PRESSURE: 60 MMHG | BODY MASS INDEX: 29.04 KG/M2 | OXYGEN SATURATION: 96 % | HEART RATE: 75 BPM

## 2019-01-01 VITALS
WEIGHT: 193.6 LBS | HEART RATE: 73 BPM | OXYGEN SATURATION: 95 % | RESPIRATION RATE: 22 BRPM | TEMPERATURE: 97.9 F | SYSTOLIC BLOOD PRESSURE: 151 MMHG | BODY MASS INDEX: 29.34 KG/M2 | HEIGHT: 68 IN | DIASTOLIC BLOOD PRESSURE: 62 MMHG

## 2019-01-01 VITALS
HEIGHT: 68 IN | DIASTOLIC BLOOD PRESSURE: 81 MMHG | WEIGHT: 189.4 LBS | RESPIRATION RATE: 16 BRPM | HEART RATE: 86 BPM | SYSTOLIC BLOOD PRESSURE: 182 MMHG | BODY MASS INDEX: 28.7 KG/M2 | OXYGEN SATURATION: 98 % | TEMPERATURE: 97.8 F

## 2019-01-01 VITALS
OXYGEN SATURATION: 98 % | SYSTOLIC BLOOD PRESSURE: 158 MMHG | DIASTOLIC BLOOD PRESSURE: 60 MMHG | BODY MASS INDEX: 28.13 KG/M2 | HEART RATE: 86 BPM | WEIGHT: 185 LBS

## 2019-01-01 VITALS
BODY MASS INDEX: 30.3 KG/M2 | RESPIRATION RATE: 16 BRPM | TEMPERATURE: 98.9 F | WEIGHT: 199.9 LBS | HEART RATE: 84 BPM | DIASTOLIC BLOOD PRESSURE: 48 MMHG | OXYGEN SATURATION: 93 % | HEIGHT: 68 IN | SYSTOLIC BLOOD PRESSURE: 88 MMHG

## 2019-01-01 VITALS
BODY MASS INDEX: 32.78 KG/M2 | HEIGHT: 68 IN | WEIGHT: 216.3 LBS | RESPIRATION RATE: 18 BRPM | TEMPERATURE: 98.1 F | HEART RATE: 87 BPM | DIASTOLIC BLOOD PRESSURE: 66 MMHG | SYSTOLIC BLOOD PRESSURE: 138 MMHG | OXYGEN SATURATION: 94 %

## 2019-01-01 VITALS
SYSTOLIC BLOOD PRESSURE: 150 MMHG | OXYGEN SATURATION: 97 % | BODY MASS INDEX: 28.43 KG/M2 | HEART RATE: 86 BPM | DIASTOLIC BLOOD PRESSURE: 68 MMHG | WEIGHT: 187 LBS

## 2019-01-01 VITALS
SYSTOLIC BLOOD PRESSURE: 148 MMHG | RESPIRATION RATE: 20 BRPM | OXYGEN SATURATION: 93 % | HEART RATE: 72 BPM | WEIGHT: 193.4 LBS | BODY MASS INDEX: 29.41 KG/M2 | DIASTOLIC BLOOD PRESSURE: 52 MMHG

## 2019-01-01 VITALS
WEIGHT: 205 LBS | SYSTOLIC BLOOD PRESSURE: 124 MMHG | HEART RATE: 73 BPM | BODY MASS INDEX: 31.17 KG/M2 | DIASTOLIC BLOOD PRESSURE: 50 MMHG | OXYGEN SATURATION: 95 %

## 2019-01-01 VITALS
OXYGEN SATURATION: 94 % | SYSTOLIC BLOOD PRESSURE: 130 MMHG | WEIGHT: 199.2 LBS | BODY MASS INDEX: 30.29 KG/M2 | HEART RATE: 74 BPM | DIASTOLIC BLOOD PRESSURE: 52 MMHG | RESPIRATION RATE: 16 BRPM

## 2019-01-01 VITALS
HEART RATE: 64 BPM | BODY MASS INDEX: 29.24 KG/M2 | OXYGEN SATURATION: 90 % | SYSTOLIC BLOOD PRESSURE: 142 MMHG | RESPIRATION RATE: 19 BRPM | DIASTOLIC BLOOD PRESSURE: 87 MMHG | WEIGHT: 192.31 LBS | TEMPERATURE: 97.6 F

## 2019-01-01 VITALS
DIASTOLIC BLOOD PRESSURE: 60 MMHG | OXYGEN SATURATION: 96 % | HEART RATE: 72 BPM | SYSTOLIC BLOOD PRESSURE: 140 MMHG | WEIGHT: 193.6 LBS | RESPIRATION RATE: 18 BRPM | BODY MASS INDEX: 29.44 KG/M2

## 2019-01-01 VITALS
BODY MASS INDEX: 28.42 KG/M2 | RESPIRATION RATE: 20 BRPM | OXYGEN SATURATION: 96 % | HEIGHT: 68 IN | SYSTOLIC BLOOD PRESSURE: 141 MMHG | DIASTOLIC BLOOD PRESSURE: 58 MMHG | TEMPERATURE: 98.3 F | HEART RATE: 82 BPM | WEIGHT: 187.5 LBS

## 2019-01-01 VITALS
BODY MASS INDEX: 29.65 KG/M2 | HEART RATE: 74 BPM | SYSTOLIC BLOOD PRESSURE: 130 MMHG | DIASTOLIC BLOOD PRESSURE: 58 MMHG | OXYGEN SATURATION: 98 % | WEIGHT: 195 LBS

## 2019-01-01 DIAGNOSIS — K92.2 GASTROINTESTINAL HEMORRHAGE, UNSPECIFIED GASTROINTESTINAL HEMORRHAGE TYPE: ICD-10-CM

## 2019-01-01 DIAGNOSIS — N18.30 TYPE 2 DIABETES MELLITUS WITH STAGE 3 CHRONIC KIDNEY DISEASE, WITH LONG-TERM CURRENT USE OF INSULIN (HCC): ICD-10-CM

## 2019-01-01 DIAGNOSIS — E78.2 MIXED HYPERLIPIDEMIA: ICD-10-CM

## 2019-01-01 DIAGNOSIS — R06.02 SHORTNESS OF BREATH: ICD-10-CM

## 2019-01-01 DIAGNOSIS — E55.9 VITAMIN D DEFICIENCY DISEASE: ICD-10-CM

## 2019-01-01 DIAGNOSIS — I42.9 CARDIOMYOPATHY, UNSPECIFIED TYPE (HCC): Primary | ICD-10-CM

## 2019-01-01 DIAGNOSIS — I10 ASYMPTOMATIC HYPERTENSION: Primary | ICD-10-CM

## 2019-01-01 DIAGNOSIS — Z79.4 TYPE 2 DIABETES MELLITUS WITH STAGE 2 CHRONIC KIDNEY DISEASE, WITH LONG-TERM CURRENT USE OF INSULIN (HCC): Primary | ICD-10-CM

## 2019-01-01 DIAGNOSIS — I50.22 CHRONIC SYSTOLIC CHF (CONGESTIVE HEART FAILURE) (HCC): ICD-10-CM

## 2019-01-01 DIAGNOSIS — I42.9 CARDIOMYOPATHY, UNSPECIFIED TYPE (HCC): ICD-10-CM

## 2019-01-01 DIAGNOSIS — I25.10 CORONARY ARTERY DISEASE INVOLVING NATIVE CORONARY ARTERY OF NATIVE HEART WITHOUT ANGINA PECTORIS: ICD-10-CM

## 2019-01-01 DIAGNOSIS — Z79.4 TYPE 2 DIABETES MELLITUS WITH STAGE 3 CHRONIC KIDNEY DISEASE, WITH LONG-TERM CURRENT USE OF INSULIN (HCC): ICD-10-CM

## 2019-01-01 DIAGNOSIS — R73.9 HYPERGLYCEMIA: ICD-10-CM

## 2019-01-01 DIAGNOSIS — I13.0 CARDIORENAL SYNDROME WITH RENAL FAILURE, STAGE 1-4 OR UNSPECIFIED CHRONIC KIDNEY DISEASE, WITH HEART FAILURE (HCC): Primary | ICD-10-CM

## 2019-01-01 DIAGNOSIS — N18.2 TYPE 2 DIABETES MELLITUS WITH STAGE 2 CHRONIC KIDNEY DISEASE, WITH LONG-TERM CURRENT USE OF INSULIN (HCC): ICD-10-CM

## 2019-01-01 DIAGNOSIS — I10 ESSENTIAL HYPERTENSION: ICD-10-CM

## 2019-01-01 DIAGNOSIS — I50.22 CHRONIC SYSTOLIC CONGESTIVE HEART FAILURE (HCC): Primary | ICD-10-CM

## 2019-01-01 DIAGNOSIS — N18.30 CHRONIC KIDNEY DISEASE, STAGE III (MODERATE) (HCC): ICD-10-CM

## 2019-01-01 DIAGNOSIS — I50.23 ACUTE ON CHRONIC SYSTOLIC CHF (CONGESTIVE HEART FAILURE) (HCC): ICD-10-CM

## 2019-01-01 DIAGNOSIS — M79.661 RIGHT CALF PAIN: ICD-10-CM

## 2019-01-01 DIAGNOSIS — I25.5 ISCHEMIC CARDIOMYOPATHY: Primary | ICD-10-CM

## 2019-01-01 DIAGNOSIS — I50.22 CHRONIC SYSTOLIC CONGESTIVE HEART FAILURE (HCC): ICD-10-CM

## 2019-01-01 DIAGNOSIS — E11.22 TYPE 2 DIABETES MELLITUS WITH STAGE 2 CHRONIC KIDNEY DISEASE, WITH LONG-TERM CURRENT USE OF INSULIN (HCC): ICD-10-CM

## 2019-01-01 DIAGNOSIS — R06.02 SHORTNESS OF BREATH: Primary | ICD-10-CM

## 2019-01-01 DIAGNOSIS — J44.9 CHRONIC OBSTRUCTIVE PULMONARY DISEASE, UNSPECIFIED COPD TYPE (HCC): ICD-10-CM

## 2019-01-01 DIAGNOSIS — E11.22 TYPE 2 DIABETES MELLITUS WITH STAGE 2 CHRONIC KIDNEY DISEASE, WITH LONG-TERM CURRENT USE OF INSULIN (HCC): Primary | ICD-10-CM

## 2019-01-01 DIAGNOSIS — I48.0 PAROXYSMAL ATRIAL FIBRILLATION (HCC): ICD-10-CM

## 2019-01-01 DIAGNOSIS — Z86.79 HISTORY OF ISCHEMIC HEART DISEASE: ICD-10-CM

## 2019-01-01 DIAGNOSIS — I10 ESSENTIAL HYPERTENSION: Primary | ICD-10-CM

## 2019-01-01 DIAGNOSIS — I50.23 ACUTE ON CHRONIC SYSTOLIC CHF (CONGESTIVE HEART FAILURE) (HCC): Primary | ICD-10-CM

## 2019-01-01 DIAGNOSIS — Z86.79 HISTORY OF CONGESTIVE HEART FAILURE: ICD-10-CM

## 2019-01-01 DIAGNOSIS — Z51.5 ADMISSION FOR HOSPICE CARE: ICD-10-CM

## 2019-01-01 DIAGNOSIS — I50.9 ACUTE ON CHRONIC CONGESTIVE HEART FAILURE, UNSPECIFIED HEART FAILURE TYPE (HCC): ICD-10-CM

## 2019-01-01 DIAGNOSIS — I50.21 ACUTE SYSTOLIC CONGESTIVE HEART FAILURE (HCC): Primary | ICD-10-CM

## 2019-01-01 DIAGNOSIS — E11.22 TYPE 2 DIABETES MELLITUS WITH STAGE 3 CHRONIC KIDNEY DISEASE, WITH LONG-TERM CURRENT USE OF INSULIN (HCC): ICD-10-CM

## 2019-01-01 DIAGNOSIS — K92.2 GASTROINTESTINAL HEMORRHAGE, UNSPECIFIED GASTROINTESTINAL HEMORRHAGE TYPE: Primary | ICD-10-CM

## 2019-01-01 DIAGNOSIS — Z79.4 TYPE 2 DIABETES MELLITUS WITH STAGE 2 CHRONIC KIDNEY DISEASE, WITH LONG-TERM CURRENT USE OF INSULIN (HCC): ICD-10-CM

## 2019-01-01 DIAGNOSIS — N18.2 TYPE 2 DIABETES MELLITUS WITH STAGE 2 CHRONIC KIDNEY DISEASE, WITH LONG-TERM CURRENT USE OF INSULIN (HCC): Primary | ICD-10-CM

## 2019-01-01 LAB
ABO/RH: NORMAL
ABSOLUTE BANDS #: 0.09 K/UL (ref 0–1)
ABSOLUTE EOS #: 0.1 K/UL (ref 0–0.4)
ABSOLUTE EOS #: 0.2 K/UL (ref 0–0.4)
ABSOLUTE EOS #: 0.3 K/UL (ref 0–0.4)
ABSOLUTE EOS #: 0.34 K/UL (ref 0–0.4)
ABSOLUTE EOS #: 0.4 K/UL (ref 0–0.4)
ABSOLUTE EOS #: 0.6 K/UL (ref 0–0.4)
ABSOLUTE EOS #: ABNORMAL K/UL (ref 0–0.4)
ABSOLUTE IMMATURE GRANULOCYTE: ABNORMAL K/UL (ref 0–0.3)
ABSOLUTE LYMPH #: 0.4 K/UL (ref 1–4.8)
ABSOLUTE LYMPH #: 0.6 K/UL (ref 1–4.8)
ABSOLUTE LYMPH #: 0.69 K/UL (ref 1–4.8)
ABSOLUTE LYMPH #: 0.7 K/UL (ref 1–4.8)
ABSOLUTE LYMPH #: 0.7 K/UL (ref 1–4.8)
ABSOLUTE LYMPH #: 0.8 K/UL (ref 1–4.8)
ABSOLUTE LYMPH #: 1 K/UL (ref 1–4.8)
ABSOLUTE LYMPH #: 1 K/UL (ref 1–4.8)
ABSOLUTE LYMPH #: 1.2 K/UL (ref 1–4.8)
ABSOLUTE LYMPH #: 1.5 K/UL (ref 1–4.8)
ABSOLUTE MONO #: 0.43 K/UL (ref 0–1)
ABSOLUTE MONO #: 0.6 K/UL (ref 0.2–0.8)
ABSOLUTE MONO #: 0.6 K/UL (ref 0–1)
ABSOLUTE MONO #: 0.7 K/UL (ref 0–1)
ABSOLUTE MONO #: 0.9 K/UL (ref 0–1)
ABSOLUTE MONO #: 0.9 K/UL (ref 0–1)
ABSOLUTE RETIC #: 0.08 M/UL (ref 0.02–0.12)
ABSOLUTE RETIC #: 0.1 M/UL (ref 0.02–0.1)
ALBUMIN SERPL-MCNC: 4.1 G/DL (ref 3.5–5.2)
ALBUMIN SERPL-MCNC: 4.5 G/DL (ref 3.5–5.2)
ALBUMIN SERPL-MCNC: 4.6 G/DL (ref 3.5–5.2)
ALBUMIN/GLOBULIN RATIO: ABNORMAL (ref 1–2.5)
ALP BLD-CCNC: 45 U/L (ref 40–129)
ALP BLD-CCNC: 60 U/L (ref 40–129)
ALP BLD-CCNC: 61 U/L (ref 40–129)
ALT SERPL-CCNC: 10 U/L (ref 5–41)
ALT SERPL-CCNC: 8 U/L (ref 5–41)
ALT SERPL-CCNC: 8 U/L (ref 5–41)
ANION GAP SERPL CALCULATED.3IONS-SCNC: 13 MMOL/L (ref 9–17)
ANION GAP SERPL CALCULATED.3IONS-SCNC: 14 MMOL/L (ref 9–17)
ANION GAP SERPL CALCULATED.3IONS-SCNC: 15 MMOL/L (ref 9–17)
ANION GAP SERPL CALCULATED.3IONS-SCNC: 16 MMOL/L (ref 9–17)
ANION GAP SERPL CALCULATED.3IONS-SCNC: 16 MMOL/L (ref 9–17)
ANION GAP SERPL CALCULATED.3IONS-SCNC: 17 MMOL/L (ref 9–17)
ANION GAP SERPL CALCULATED.3IONS-SCNC: 18 MMOL/L (ref 9–17)
ANION GAP SERPL CALCULATED.3IONS-SCNC: 19 MMOL/L (ref 9–17)
ANION GAP SERPL CALCULATED.3IONS-SCNC: 20 MMOL/L (ref 9–17)
ANTIBODY SCREEN: NEGATIVE
ARM BAND NUMBER: NORMAL
AST SERPL-CCNC: 11 U/L
AST SERPL-CCNC: 13 U/L
AST SERPL-CCNC: 13 U/L
BANDS: 1 % (ref 0–10)
BASOPHILS # BLD: 0 % (ref 0–2)
BASOPHILS # BLD: 1 % (ref 0–2)
BASOPHILS # BLD: ABNORMAL % (ref 0–2)
BASOPHILS ABSOLUTE: 0 K/UL (ref 0–0.2)
BASOPHILS ABSOLUTE: 0.09 K/UL (ref 0–0.2)
BASOPHILS ABSOLUTE: 0.1 K/UL (ref 0–0.2)
BASOPHILS ABSOLUTE: ABNORMAL K/UL (ref 0–0.2)
BILIRUB SERPL-MCNC: 0.26 MG/DL (ref 0.3–1.2)
BILIRUB SERPL-MCNC: 0.3 MG/DL (ref 0.3–1.2)
BILIRUB SERPL-MCNC: 0.3 MG/DL (ref 0.3–1.2)
BNP INTERPRETATION: ABNORMAL
BUN BLDV-MCNC: 28 MG/DL (ref 8–23)
BUN BLDV-MCNC: 36 MG/DL (ref 8–23)
BUN BLDV-MCNC: 41 MG/DL (ref 8–23)
BUN BLDV-MCNC: 47 MG/DL (ref 8–23)
BUN BLDV-MCNC: 49 MG/DL (ref 8–23)
BUN BLDV-MCNC: 50 MG/DL (ref 8–23)
BUN BLDV-MCNC: 50 MG/DL (ref 8–23)
BUN BLDV-MCNC: 55 MG/DL (ref 8–23)
BUN BLDV-MCNC: 55 MG/DL (ref 8–23)
BUN BLDV-MCNC: 56 MG/DL (ref 8–23)
BUN BLDV-MCNC: 56 MG/DL (ref 8–23)
BUN BLDV-MCNC: 60 MG/DL (ref 8–23)
BUN BLDV-MCNC: 60 MG/DL (ref 8–23)
BUN BLDV-MCNC: 61 MG/DL (ref 8–23)
BUN BLDV-MCNC: 62 MG/DL (ref 8–23)
BUN BLDV-MCNC: 63 MG/DL (ref 8–23)
BUN BLDV-MCNC: 67 MG/DL (ref 8–23)
BUN BLDV-MCNC: 67 MG/DL (ref 8–23)
BUN BLDV-MCNC: 71 MG/DL (ref 8–23)
BUN BLDV-MCNC: 71 MG/DL (ref 8–23)
BUN BLDV-MCNC: 75 MG/DL (ref 8–23)
BUN BLDV-MCNC: 77 MG/DL (ref 8–23)
BUN BLDV-MCNC: 79 MG/DL (ref 8–23)
BUN BLDV-MCNC: 83 MG/DL (ref 8–23)
BUN BLDV-MCNC: 88 MG/DL (ref 8–23)
BUN BLDV-MCNC: 90 MG/DL (ref 8–23)
BUN/CREAT BLD: 22 (ref 9–20)
BUN/CREAT BLD: 23 (ref 9–20)
BUN/CREAT BLD: 24 (ref 9–20)
BUN/CREAT BLD: 25 (ref 9–20)
BUN/CREAT BLD: 26 (ref 9–20)
BUN/CREAT BLD: 27 (ref 9–20)
BUN/CREAT BLD: 27 (ref 9–20)
BUN/CREAT BLD: 28 (ref 9–20)
BUN/CREAT BLD: 28 (ref 9–20)
BUN/CREAT BLD: 29 (ref 9–20)
BUN/CREAT BLD: 30 (ref 9–20)
BUN/CREAT BLD: 30 (ref 9–20)
BUN/CREAT BLD: 31 (ref 9–20)
BUN/CREAT BLD: 34 (ref 9–20)
BUN/CREAT BLD: 36 (ref 9–20)
BUN/CREAT BLD: 37 (ref 9–20)
BUN/CREAT BLD: 40 (ref 9–20)
BUN/CREAT BLD: 41 (ref 9–20)
CALCIUM SERPL-MCNC: 8.3 MG/DL (ref 8.6–10.4)
CALCIUM SERPL-MCNC: 8.3 MG/DL (ref 8.6–10.4)
CALCIUM SERPL-MCNC: 8.4 MG/DL (ref 8.6–10.4)
CALCIUM SERPL-MCNC: 8.5 MG/DL (ref 8.6–10.4)
CALCIUM SERPL-MCNC: 8.5 MG/DL (ref 8.6–10.4)
CALCIUM SERPL-MCNC: 8.6 MG/DL (ref 8.6–10.4)
CALCIUM SERPL-MCNC: 8.6 MG/DL (ref 8.6–10.4)
CALCIUM SERPL-MCNC: 8.7 MG/DL (ref 8.6–10.4)
CALCIUM SERPL-MCNC: 8.8 MG/DL (ref 8.6–10.4)
CALCIUM SERPL-MCNC: 8.9 MG/DL (ref 8.6–10.4)
CALCIUM SERPL-MCNC: 9 MG/DL (ref 8.6–10.4)
CALCIUM SERPL-MCNC: 9 MG/DL (ref 8.6–10.4)
CALCIUM SERPL-MCNC: 9.1 MG/DL (ref 8.6–10.4)
CALCIUM SERPL-MCNC: 9.2 MG/DL (ref 8.6–10.4)
CALCIUM SERPL-MCNC: 9.4 MG/DL (ref 8.6–10.4)
CALCIUM SERPL-MCNC: 9.5 MG/DL (ref 8.6–10.4)
CALCIUM SERPL-MCNC: 9.7 MG/DL (ref 8.6–10.4)
CALCIUM SERPL-MCNC: 9.7 MG/DL (ref 8.6–10.4)
CALCIUM SERPL-MCNC: 9.8 MG/DL (ref 8.6–10.4)
CALCIUM SERPL-MCNC: 9.8 MG/DL (ref 8.6–10.4)
CHLORIDE BLD-SCNC: 100 MMOL/L (ref 98–107)
CHLORIDE BLD-SCNC: 103 MMOL/L (ref 98–107)
CHLORIDE BLD-SCNC: 103 MMOL/L (ref 98–107)
CHLORIDE BLD-SCNC: 104 MMOL/L (ref 98–107)
CHLORIDE BLD-SCNC: 105 MMOL/L (ref 98–107)
CHLORIDE BLD-SCNC: 106 MMOL/L (ref 98–107)
CHLORIDE BLD-SCNC: 107 MMOL/L (ref 98–107)
CHLORIDE BLD-SCNC: 108 MMOL/L (ref 98–107)
CHLORIDE BLD-SCNC: 109 MMOL/L (ref 98–107)
CHLORIDE BLD-SCNC: 110 MMOL/L (ref 98–107)
CHLORIDE BLD-SCNC: 93 MMOL/L (ref 98–107)
CHLORIDE BLD-SCNC: 93 MMOL/L (ref 98–107)
CHLORIDE BLD-SCNC: 95 MMOL/L (ref 98–107)
CHLORIDE BLD-SCNC: 95 MMOL/L (ref 98–107)
CHLORIDE BLD-SCNC: 96 MMOL/L (ref 98–107)
CHLORIDE BLD-SCNC: 97 MMOL/L (ref 98–107)
CHLORIDE BLD-SCNC: 98 MMOL/L (ref 98–107)
CHLORIDE BLD-SCNC: 98 MMOL/L (ref 98–107)
CHLORIDE BLD-SCNC: 99 MMOL/L (ref 98–107)
CHOLESTEROL/HDL RATIO: 3.8
CHOLESTEROL: 154 MG/DL
CO2: 16 MMOL/L (ref 20–31)
CO2: 17 MMOL/L (ref 20–31)
CO2: 18 MMOL/L (ref 20–31)
CO2: 19 MMOL/L (ref 20–31)
CO2: 20 MMOL/L (ref 20–31)
CO2: 20 MMOL/L (ref 20–31)
CO2: 22 MMOL/L (ref 20–31)
CO2: 23 MMOL/L (ref 20–31)
CO2: 24 MMOL/L (ref 20–31)
CO2: 25 MMOL/L (ref 20–31)
CREAT SERPL-MCNC: 1.28 MG/DL (ref 0.7–1.2)
CREAT SERPL-MCNC: 1.61 MG/DL (ref 0.7–1.2)
CREAT SERPL-MCNC: 1.62 MG/DL (ref 0.7–1.2)
CREAT SERPL-MCNC: 1.68 MG/DL (ref 0.7–1.2)
CREAT SERPL-MCNC: 1.78 MG/DL (ref 0.7–1.2)
CREAT SERPL-MCNC: 1.93 MG/DL (ref 0.7–1.2)
CREAT SERPL-MCNC: 1.94 MG/DL (ref 0.7–1.2)
CREAT SERPL-MCNC: 1.94 MG/DL (ref 0.7–1.2)
CREAT SERPL-MCNC: 1.98 MG/DL (ref 0.7–1.2)
CREAT SERPL-MCNC: 2 MG/DL (ref 0.7–1.2)
CREAT SERPL-MCNC: 2.05 MG/DL (ref 0.7–1.2)
CREAT SERPL-MCNC: 2.06 MG/DL (ref 0.7–1.2)
CREAT SERPL-MCNC: 2.11 MG/DL (ref 0.7–1.2)
CREAT SERPL-MCNC: 2.13 MG/DL (ref 0.7–1.2)
CREAT SERPL-MCNC: 2.16 MG/DL (ref 0.7–1.2)
CREAT SERPL-MCNC: 2.22 MG/DL (ref 0.7–1.2)
CREAT SERPL-MCNC: 2.25 MG/DL (ref 0.7–1.2)
CREAT SERPL-MCNC: 2.28 MG/DL (ref 0.7–1.2)
CREAT SERPL-MCNC: 2.32 MG/DL (ref 0.7–1.2)
CREAT SERPL-MCNC: 2.32 MG/DL (ref 0.7–1.2)
CREAT SERPL-MCNC: 2.33 MG/DL (ref 0.7–1.2)
CREAT SERPL-MCNC: 2.36 MG/DL (ref 0.7–1.2)
CREAT SERPL-MCNC: 2.38 MG/DL (ref 0.7–1.2)
CREAT SERPL-MCNC: 2.43 MG/DL (ref 0.7–1.2)
CREAT SERPL-MCNC: 2.51 MG/DL (ref 0.7–1.2)
CREAT SERPL-MCNC: 2.6 MG/DL (ref 0.7–1.2)
CREAT SERPL-MCNC: 2.61 MG/DL (ref 0.7–1.2)
CREAT SERPL-MCNC: 2.66 MG/DL (ref 0.7–1.2)
CREAT SERPL-MCNC: 2.71 MG/DL (ref 0.7–1.2)
CREAT SERPL-MCNC: 3.57 MG/DL (ref 0.7–1.2)
CULTURE: NORMAL
CULTURE: NORMAL
DATE, STOOL #1: NORMAL
DATE, STOOL #2: NORMAL
DATE, STOOL #3: NORMAL
DIFFERENTIAL TYPE: ABNORMAL
DIFFERENTIAL TYPE: YES
EKG ATRIAL RATE: 73 BPM
EKG ATRIAL RATE: 85 BPM
EKG ATRIAL RATE: 97 BPM
EKG P AXIS: 67 DEGREES
EKG P AXIS: 76 DEGREES
EKG P AXIS: 85 DEGREES
EKG P-R INTERVAL: 240 MS
EKG P-R INTERVAL: 280 MS
EKG Q-T INTERVAL: 358 MS
EKG Q-T INTERVAL: 392 MS
EKG Q-T INTERVAL: 420 MS
EKG QRS DURATION: 102 MS
EKG QRS DURATION: 104 MS
EKG QRS DURATION: 104 MS
EKG QTC CALCULATION (BAZETT): 408 MS
EKG QTC CALCULATION (BAZETT): 426 MS
EKG QTC CALCULATION (BAZETT): 431 MS
EKG R AXIS: 69 DEGREES
EKG R AXIS: 83 DEGREES
EKG R AXIS: 89 DEGREES
EKG T AXIS: -110 DEGREES
EKG T AXIS: -128 DEGREES
EKG T AXIS: -134 DEGREES
EKG VENTRICULAR RATE: 57 BPM
EKG VENTRICULAR RATE: 73 BPM
EKG VENTRICULAR RATE: 85 BPM
EOSINOPHILS RELATIVE PERCENT: 1 % (ref 0–5)
EOSINOPHILS RELATIVE PERCENT: 4 % (ref 0–5)
EOSINOPHILS RELATIVE PERCENT: 4 % (ref 1–4)
EOSINOPHILS RELATIVE PERCENT: 6 % (ref 0–5)
EOSINOPHILS RELATIVE PERCENT: 6 % (ref 0–5)
EOSINOPHILS RELATIVE PERCENT: ABNORMAL % (ref 0–5)
ESTIMATED AVERAGE GLUCOSE: 105 MG/DL
ESTIMATED AVERAGE GLUCOSE: 131 MG/DL
ESTIMATED AVERAGE GLUCOSE: 174 MG/DL
EXPIRATION DATE: NORMAL
FERRITIN: 152 UG/L (ref 30–400)
FOLATE: >20 NG/ML
FOLATE: >20 NG/ML
GFR AFRICAN AMERICAN: 20 ML/MIN
GFR AFRICAN AMERICAN: 28 ML/MIN
GFR AFRICAN AMERICAN: 28 ML/MIN
GFR AFRICAN AMERICAN: 29 ML/MIN
GFR AFRICAN AMERICAN: 29 ML/MIN
GFR AFRICAN AMERICAN: 30 ML/MIN
GFR AFRICAN AMERICAN: 32 ML/MIN
GFR AFRICAN AMERICAN: 32 ML/MIN
GFR AFRICAN AMERICAN: 33 ML/MIN
GFR AFRICAN AMERICAN: 34 ML/MIN
GFR AFRICAN AMERICAN: 35 ML/MIN
GFR AFRICAN AMERICAN: 35 ML/MIN
GFR AFRICAN AMERICAN: 36 ML/MIN
GFR AFRICAN AMERICAN: 37 ML/MIN
GFR AFRICAN AMERICAN: 37 ML/MIN
GFR AFRICAN AMERICAN: 38 ML/MIN
GFR AFRICAN AMERICAN: 38 ML/MIN
GFR AFRICAN AMERICAN: 40 ML/MIN
GFR AFRICAN AMERICAN: 40 ML/MIN
GFR AFRICAN AMERICAN: 41 ML/MIN
GFR AFRICAN AMERICAN: 45 ML/MIN
GFR AFRICAN AMERICAN: 48 ML/MIN
GFR AFRICAN AMERICAN: 50 ML/MIN
GFR AFRICAN AMERICAN: 51 ML/MIN
GFR AFRICAN AMERICAN: >60 ML/MIN
GFR NON-AFRICAN AMERICAN: 17 ML/MIN
GFR NON-AFRICAN AMERICAN: 23 ML/MIN
GFR NON-AFRICAN AMERICAN: 23 ML/MIN
GFR NON-AFRICAN AMERICAN: 24 ML/MIN
GFR NON-AFRICAN AMERICAN: 24 ML/MIN
GFR NON-AFRICAN AMERICAN: 25 ML/MIN
GFR NON-AFRICAN AMERICAN: 26 ML/MIN
GFR NON-AFRICAN AMERICAN: 27 ML/MIN
GFR NON-AFRICAN AMERICAN: 28 ML/MIN
GFR NON-AFRICAN AMERICAN: 29 ML/MIN
GFR NON-AFRICAN AMERICAN: 29 ML/MIN
GFR NON-AFRICAN AMERICAN: 30 ML/MIN
GFR NON-AFRICAN AMERICAN: 30 ML/MIN
GFR NON-AFRICAN AMERICAN: 31 ML/MIN
GFR NON-AFRICAN AMERICAN: 32 ML/MIN
GFR NON-AFRICAN AMERICAN: 32 ML/MIN
GFR NON-AFRICAN AMERICAN: 33 ML/MIN
GFR NON-AFRICAN AMERICAN: 33 ML/MIN
GFR NON-AFRICAN AMERICAN: 34 ML/MIN
GFR NON-AFRICAN AMERICAN: 37 ML/MIN
GFR NON-AFRICAN AMERICAN: 40 ML/MIN
GFR NON-AFRICAN AMERICAN: 42 ML/MIN
GFR NON-AFRICAN AMERICAN: 42 ML/MIN
GFR NON-AFRICAN AMERICAN: 55 ML/MIN
GFR SERPL CREATININE-BSD FRML MDRD: ABNORMAL ML/MIN/{1.73_M2}
GLUCOSE BLD-MCNC: 101 MG/DL (ref 65–99)
GLUCOSE BLD-MCNC: 102 MG/DL (ref 65–99)
GLUCOSE BLD-MCNC: 103 MG/DL (ref 70–99)
GLUCOSE BLD-MCNC: 105 MG/DL (ref 65–99)
GLUCOSE BLD-MCNC: 105 MG/DL (ref 75–110)
GLUCOSE BLD-MCNC: 106 MG/DL (ref 70–99)
GLUCOSE BLD-MCNC: 110 MG/DL (ref 75–110)
GLUCOSE BLD-MCNC: 115 MG/DL (ref 65–99)
GLUCOSE BLD-MCNC: 118 MG/DL (ref 65–99)
GLUCOSE BLD-MCNC: 122 MG/DL (ref 65–99)
GLUCOSE BLD-MCNC: 123 MG/DL (ref 70–99)
GLUCOSE BLD-MCNC: 125 MG/DL (ref 65–99)
GLUCOSE BLD-MCNC: 128 MG/DL (ref 70–99)
GLUCOSE BLD-MCNC: 128 MG/DL (ref 70–99)
GLUCOSE BLD-MCNC: 129 MG/DL (ref 75–110)
GLUCOSE BLD-MCNC: 134 MG/DL (ref 65–99)
GLUCOSE BLD-MCNC: 136 MG/DL (ref 65–99)
GLUCOSE BLD-MCNC: 137 MG/DL (ref 75–110)
GLUCOSE BLD-MCNC: 138 MG/DL (ref 75–110)
GLUCOSE BLD-MCNC: 140 MG/DL (ref 70–99)
GLUCOSE BLD-MCNC: 141 MG/DL (ref 65–99)
GLUCOSE BLD-MCNC: 142 MG/DL (ref 65–99)
GLUCOSE BLD-MCNC: 143 MG/DL (ref 65–99)
GLUCOSE BLD-MCNC: 143 MG/DL (ref 70–99)
GLUCOSE BLD-MCNC: 144 MG/DL (ref 65–99)
GLUCOSE BLD-MCNC: 144 MG/DL (ref 65–99)
GLUCOSE BLD-MCNC: 144 MG/DL (ref 70–99)
GLUCOSE BLD-MCNC: 145 MG/DL (ref 75–110)
GLUCOSE BLD-MCNC: 146 MG/DL (ref 65–99)
GLUCOSE BLD-MCNC: 147 MG/DL (ref 65–99)
GLUCOSE BLD-MCNC: 150 MG/DL (ref 65–99)
GLUCOSE BLD-MCNC: 153 MG/DL (ref 70–99)
GLUCOSE BLD-MCNC: 154 MG/DL (ref 75–110)
GLUCOSE BLD-MCNC: 160 MG/DL (ref 75–110)
GLUCOSE BLD-MCNC: 161 MG/DL (ref 75–110)
GLUCOSE BLD-MCNC: 162 MG/DL (ref 75–110)
GLUCOSE BLD-MCNC: 162 MG/DL (ref 75–110)
GLUCOSE BLD-MCNC: 167 MG/DL (ref 65–99)
GLUCOSE BLD-MCNC: 167 MG/DL (ref 70–99)
GLUCOSE BLD-MCNC: 169 MG/DL (ref 65–99)
GLUCOSE BLD-MCNC: 169 MG/DL (ref 65–99)
GLUCOSE BLD-MCNC: 170 MG/DL (ref 65–99)
GLUCOSE BLD-MCNC: 172 MG/DL (ref 65–99)
GLUCOSE BLD-MCNC: 176 MG/DL (ref 65–99)
GLUCOSE BLD-MCNC: 183 MG/DL (ref 65–99)
GLUCOSE BLD-MCNC: 186 MG/DL (ref 70–99)
GLUCOSE BLD-MCNC: 186 MG/DL (ref 70–99)
GLUCOSE BLD-MCNC: 187 MG/DL (ref 65–99)
GLUCOSE BLD-MCNC: 189 MG/DL (ref 70–99)
GLUCOSE BLD-MCNC: 193 MG/DL (ref 65–99)
GLUCOSE BLD-MCNC: 193 MG/DL (ref 75–110)
GLUCOSE BLD-MCNC: 194 MG/DL (ref 65–99)
GLUCOSE BLD-MCNC: 194 MG/DL (ref 75–110)
GLUCOSE BLD-MCNC: 195 MG/DL (ref 75–110)
GLUCOSE BLD-MCNC: 200 MG/DL (ref 65–99)
GLUCOSE BLD-MCNC: 200 MG/DL (ref 75–110)
GLUCOSE BLD-MCNC: 201 MG/DL (ref 65–99)
GLUCOSE BLD-MCNC: 203 MG/DL (ref 65–99)
GLUCOSE BLD-MCNC: 204 MG/DL (ref 75–110)
GLUCOSE BLD-MCNC: 208 MG/DL (ref 65–99)
GLUCOSE BLD-MCNC: 210 MG/DL (ref 70–99)
GLUCOSE BLD-MCNC: 216 MG/DL (ref 65–99)
GLUCOSE BLD-MCNC: 218 MG/DL (ref 65–99)
GLUCOSE BLD-MCNC: 219 MG/DL (ref 65–99)
GLUCOSE BLD-MCNC: 220 MG/DL (ref 65–99)
GLUCOSE BLD-MCNC: 226 MG/DL (ref 70–99)
GLUCOSE BLD-MCNC: 228 MG/DL (ref 65–99)
GLUCOSE BLD-MCNC: 229 MG/DL (ref 65–99)
GLUCOSE BLD-MCNC: 230 MG/DL (ref 65–99)
GLUCOSE BLD-MCNC: 232 MG/DL (ref 65–99)
GLUCOSE BLD-MCNC: 234 MG/DL (ref 65–99)
GLUCOSE BLD-MCNC: 237 MG/DL (ref 65–99)
GLUCOSE BLD-MCNC: 237 MG/DL (ref 75–110)
GLUCOSE BLD-MCNC: 238 MG/DL (ref 75–110)
GLUCOSE BLD-MCNC: 238 MG/DL (ref 75–110)
GLUCOSE BLD-MCNC: 239 MG/DL (ref 75–110)
GLUCOSE BLD-MCNC: 240 MG/DL (ref 70–99)
GLUCOSE BLD-MCNC: 241 MG/DL (ref 65–99)
GLUCOSE BLD-MCNC: 242 MG/DL (ref 70–99)
GLUCOSE BLD-MCNC: 246 MG/DL (ref 65–99)
GLUCOSE BLD-MCNC: 248 MG/DL (ref 70–99)
GLUCOSE BLD-MCNC: 250 MG/DL (ref 65–99)
GLUCOSE BLD-MCNC: 251 MG/DL (ref 65–99)
GLUCOSE BLD-MCNC: 251 MG/DL (ref 65–99)
GLUCOSE BLD-MCNC: 252 MG/DL (ref 65–99)
GLUCOSE BLD-MCNC: 253 MG/DL (ref 65–99)
GLUCOSE BLD-MCNC: 254 MG/DL (ref 70–99)
GLUCOSE BLD-MCNC: 256 MG/DL (ref 65–99)
GLUCOSE BLD-MCNC: 256 MG/DL (ref 75–110)
GLUCOSE BLD-MCNC: 263 MG/DL (ref 65–99)
GLUCOSE BLD-MCNC: 263 MG/DL (ref 65–99)
GLUCOSE BLD-MCNC: 264 MG/DL (ref 65–99)
GLUCOSE BLD-MCNC: 265 MG/DL (ref 70–99)
GLUCOSE BLD-MCNC: 269 MG/DL (ref 65–99)
GLUCOSE BLD-MCNC: 270 MG/DL (ref 65–99)
GLUCOSE BLD-MCNC: 275 MG/DL (ref 65–99)
GLUCOSE BLD-MCNC: 275 MG/DL (ref 70–99)
GLUCOSE BLD-MCNC: 283 MG/DL (ref 70–99)
GLUCOSE BLD-MCNC: 286 MG/DL (ref 65–99)
GLUCOSE BLD-MCNC: 289 MG/DL (ref 65–99)
GLUCOSE BLD-MCNC: 293 MG/DL (ref 65–99)
GLUCOSE BLD-MCNC: 294 MG/DL (ref 65–99)
GLUCOSE BLD-MCNC: 298 MG/DL (ref 65–99)
GLUCOSE BLD-MCNC: 302 MG/DL (ref 65–99)
GLUCOSE BLD-MCNC: 310 MG/DL (ref 65–99)
GLUCOSE BLD-MCNC: 333 MG/DL (ref 65–99)
GLUCOSE BLD-MCNC: 336 MG/DL (ref 70–99)
GLUCOSE BLD-MCNC: 352 MG/DL (ref 70–99)
GLUCOSE BLD-MCNC: 38 MG/DL (ref 75–110)
GLUCOSE BLD-MCNC: 400 MG/DL (ref 65–99)
GLUCOSE BLD-MCNC: 409 MG/DL (ref 70–99)
GLUCOSE BLD-MCNC: 425 MG/DL (ref 65–99)
GLUCOSE BLD-MCNC: 49 MG/DL (ref 75–110)
GLUCOSE BLD-MCNC: 52 MG/DL (ref 65–99)
GLUCOSE BLD-MCNC: 53 MG/DL (ref 75–110)
GLUCOSE BLD-MCNC: 55 MG/DL (ref 65–99)
GLUCOSE BLD-MCNC: 62 MG/DL (ref 65–99)
GLUCOSE BLD-MCNC: 62 MG/DL (ref 75–110)
GLUCOSE BLD-MCNC: 63 MG/DL (ref 70–99)
GLUCOSE BLD-MCNC: 64 MG/DL (ref 65–99)
GLUCOSE BLD-MCNC: 65 MG/DL (ref 65–99)
GLUCOSE BLD-MCNC: 67 MG/DL (ref 75–110)
GLUCOSE BLD-MCNC: 74 MG/DL (ref 70–99)
GLUCOSE BLD-MCNC: 78 MG/DL (ref 70–99)
GLUCOSE BLD-MCNC: 79 MG/DL (ref 65–99)
GLUCOSE BLD-MCNC: 82 MG/DL (ref 70–99)
GLUCOSE BLD-MCNC: 82 MG/DL (ref 75–110)
GLUCOSE BLD-MCNC: 83 MG/DL (ref 75–110)
GLUCOSE BLD-MCNC: 87 MG/DL (ref 65–99)
GLUCOSE BLD-MCNC: 89 MG/DL (ref 65–99)
GLUCOSE BLD-MCNC: 95 MG/DL (ref 75–110)
GLUCOSE BLD-MCNC: 96 MG/DL (ref 75–110)
GLUCOSE BLD-MCNC: 97 MG/DL (ref 75–110)
HBA1C MFR BLD: 5.3 % (ref 4.8–5.9)
HBA1C MFR BLD: 6.2 % (ref 4.8–5.9)
HBA1C MFR BLD: 7.7 % (ref 4–6)
HCT VFR BLD CALC: 24.7 % (ref 41–53)
HCT VFR BLD CALC: 26 % (ref 41–53)
HCT VFR BLD CALC: 26 % (ref 41–53)
HCT VFR BLD CALC: 26.3 % (ref 41–53)
HCT VFR BLD CALC: 26.6 % (ref 41–53)
HCT VFR BLD CALC: 26.7 % (ref 41–53)
HCT VFR BLD CALC: 26.8 % (ref 41–53)
HCT VFR BLD CALC: 27 % (ref 41–53)
HCT VFR BLD CALC: 27.2 % (ref 41–53)
HCT VFR BLD CALC: 27.5 % (ref 41–53)
HCT VFR BLD CALC: 30 % (ref 41–53)
HCT VFR BLD CALC: 34.8 % (ref 41–53)
HCT VFR BLD CALC: 35.8 % (ref 41–53)
HDLC SERPL-MCNC: 41 MG/DL
HEMOCCULT SP1 STL QL: NEGATIVE
HEMOCCULT SP2 STL QL: NORMAL
HEMOCCULT SP3 STL QL: NORMAL
HEMOGLOBIN: 11.6 G/DL (ref 13.5–17.5)
HEMOGLOBIN: 12.1 G/DL (ref 13.5–17.5)
HEMOGLOBIN: 7.8 G/DL (ref 13.5–17.5)
HEMOGLOBIN: 8 G/DL (ref 13.5–17.5)
HEMOGLOBIN: 8 G/DL (ref 13.5–17.5)
HEMOGLOBIN: 8.1 G/DL (ref 13.5–17.5)
HEMOGLOBIN: 8.1 G/DL (ref 13.5–17.5)
HEMOGLOBIN: 8.3 G/DL (ref 13.5–17.5)
HEMOGLOBIN: 8.3 G/DL (ref 13.5–17.5)
HEMOGLOBIN: 8.4 G/DL (ref 13.5–17.5)
HEMOGLOBIN: 8.5 G/DL (ref 13.5–17.5)
HEMOGLOBIN: 8.6 G/DL (ref 13.5–17.5)
HEMOGLOBIN: 8.6 G/DL (ref 13.5–17.5)
HEMOGLOBIN: 8.7 G/DL (ref 13.5–17.5)
HEMOGLOBIN: 8.8 G/DL (ref 13.5–17.5)
HEMOGLOBIN: 8.8 G/DL (ref 13.5–17.5)
HEMOGLOBIN: 8.9 G/DL (ref 13.5–17.5)
HEMOGLOBIN: 8.9 G/DL (ref 13.5–17.5)
HEMOGLOBIN: 9 G/DL (ref 13.5–17.5)
HEMOGLOBIN: 9 G/DL (ref 13.5–17.5)
HEMOGLOBIN: 9.1 G/DL (ref 13.5–17.5)
HEMOGLOBIN: 9.1 G/DL (ref 13.5–17.5)
HEMOGLOBIN: 9.3 G/DL (ref 13.5–17.5)
HEMOGLOBIN: 9.6 G/DL (ref 13.5–17.5)
IMMATURE GRANULOCYTES: ABNORMAL %
IMMATURE RETIC FRACT: ABNORMAL %
IMMATURE RETIC FRACT: ABNORMAL %
INR BLD: 1.1
INR BLD: 1.1
INR BLD: 1.2
INR BLD: 1.2
INR BLD: 1.4
IRON SATURATION: 14 % (ref 20–55)
IRON SATURATION: 9 % (ref 20–55)
IRON: 29 UG/DL (ref 59–158)
IRON: 40 UG/DL (ref 59–158)
LDL CHOLESTEROL: 48 MG/DL (ref 0–130)
LV EF: 20 %
LV EF: 35 %
LV EF: 47 %
LVEF MODALITY: NORMAL
LYMPHOCYTES # BLD: 10 % (ref 13–44)
LYMPHOCYTES # BLD: 12 % (ref 13–44)
LYMPHOCYTES # BLD: 14 % (ref 13–44)
LYMPHOCYTES # BLD: 17 % (ref 24–44)
LYMPHOCYTES # BLD: 18 % (ref 13–44)
LYMPHOCYTES # BLD: 20 % (ref 13–44)
LYMPHOCYTES # BLD: 5 % (ref 13–44)
LYMPHOCYTES # BLD: 7 % (ref 13–44)
LYMPHOCYTES # BLD: 7 % (ref 13–44)
LYMPHOCYTES # BLD: 8 % (ref 13–44)
LYMPHOCYTES # BLD: 9 % (ref 13–44)
Lab: NORMAL
Lab: NORMAL
MAGNESIUM: 2.2 MG/DL (ref 1.6–2.6)
MAGNESIUM: 2.3 MG/DL (ref 1.6–2.6)
MAGNESIUM: 2.4 MG/DL (ref 1.6–2.6)
MAGNESIUM: 2.5 MG/DL (ref 1.6–2.6)
MAGNESIUM: 2.9 MG/DL (ref 1.6–2.6)
MAGNESIUM: 3.4 MG/DL (ref 1.6–2.6)
MCH RBC QN AUTO: 27.3 PG (ref 26–34)
MCH RBC QN AUTO: 27.5 PG (ref 26–34)
MCH RBC QN AUTO: 27.6 PG (ref 26–34)
MCH RBC QN AUTO: 27.8 PG (ref 26–34)
MCH RBC QN AUTO: 27.8 PG (ref 26–34)
MCH RBC QN AUTO: 28.1 PG (ref 26–34)
MCH RBC QN AUTO: 28.3 PG (ref 26–34)
MCH RBC QN AUTO: 28.6 PG (ref 26–34)
MCH RBC QN AUTO: 28.8 PG (ref 26–34)
MCH RBC QN AUTO: 29.6 PG (ref 26–34)
MCH RBC QN AUTO: 29.7 PG (ref 26–34)
MCH RBC QN AUTO: 29.8 PG (ref 26–34)
MCH RBC QN AUTO: 29.8 PG (ref 26–34)
MCHC RBC AUTO-ENTMCNC: 32.1 G/DL (ref 31–37)
MCHC RBC AUTO-ENTMCNC: 32.1 G/DL (ref 31–37)
MCHC RBC AUTO-ENTMCNC: 32.3 G/DL (ref 31–37)
MCHC RBC AUTO-ENTMCNC: 32.5 G/DL (ref 31–37)
MCHC RBC AUTO-ENTMCNC: 32.6 G/DL (ref 31–37)
MCHC RBC AUTO-ENTMCNC: 32.6 G/DL (ref 31–37)
MCHC RBC AUTO-ENTMCNC: 32.7 G/DL (ref 31–37)
MCHC RBC AUTO-ENTMCNC: 32.8 G/DL (ref 31–37)
MCHC RBC AUTO-ENTMCNC: 32.9 G/DL (ref 31–37)
MCHC RBC AUTO-ENTMCNC: 32.9 G/DL (ref 31–37)
MCHC RBC AUTO-ENTMCNC: 33 G/DL (ref 31–37)
MCHC RBC AUTO-ENTMCNC: 33.5 G/DL (ref 31–37)
MCHC RBC AUTO-ENTMCNC: 33.7 G/DL (ref 31–37)
MCHC RBC AUTO-ENTMCNC: 34.2 G/DL (ref 31–37)
MCHC RBC AUTO-ENTMCNC: 34.4 G/DL (ref 31–37)
MCV RBC AUTO: 83.6 FL (ref 80–100)
MCV RBC AUTO: 83.8 FL (ref 80–100)
MCV RBC AUTO: 84.1 FL (ref 80–100)
MCV RBC AUTO: 85.1 FL (ref 80–100)
MCV RBC AUTO: 85.2 FL (ref 80–100)
MCV RBC AUTO: 85.3 FL (ref 80–100)
MCV RBC AUTO: 85.4 FL (ref 80–100)
MCV RBC AUTO: 86 FL (ref 80–100)
MCV RBC AUTO: 86.6 FL (ref 80–100)
MCV RBC AUTO: 86.7 FL (ref 80–100)
MCV RBC AUTO: 86.8 FL (ref 80–100)
MCV RBC AUTO: 87.9 FL (ref 80–100)
MCV RBC AUTO: 88.4 FL (ref 80–100)
MCV RBC AUTO: 88.5 FL (ref 80–100)
MCV RBC AUTO: 89 FL (ref 80–100)
MONOCYTES # BLD: 10 % (ref 1–7)
MONOCYTES # BLD: 10 % (ref 5–9)
MONOCYTES # BLD: 11 % (ref 5–9)
MONOCYTES # BLD: 11 % (ref 5–9)
MONOCYTES # BLD: 13 % (ref 5–9)
MONOCYTES # BLD: 5 % (ref 5–9)
MONOCYTES # BLD: 7 % (ref 5–9)
MONOCYTES # BLD: 7 % (ref 5–9)
MONOCYTES # BLD: 8 % (ref 5–9)
MONOCYTES # BLD: 8 % (ref 5–9)
MONOCYTES # BLD: 9 % (ref 5–9)
MORPHOLOGY: ABNORMAL
NRBC AUTOMATED: ABNORMAL PER 100 WBC
PARTIAL THROMBOPLASTIN TIME: 33.4 SEC (ref 21–33)
PATIENT FASTING?: YES
PDW BLD-RTO: 13.3 % (ref 12.1–15.2)
PDW BLD-RTO: 13.4 % (ref 12.1–15.2)
PDW BLD-RTO: 13.9 % (ref 11.5–14.5)
PDW BLD-RTO: 13.9 % (ref 12.1–15.2)
PDW BLD-RTO: 14.2 % (ref 11.5–14.5)
PDW BLD-RTO: 16.1 % (ref 12.1–15.2)
PDW BLD-RTO: 16.2 % (ref 12.1–15.2)
PDW BLD-RTO: 16.3 % (ref 12.1–15.2)
PDW BLD-RTO: 16.4 % (ref 12.1–15.2)
PDW BLD-RTO: 16.5 % (ref 12.1–15.2)
PDW BLD-RTO: 16.7 % (ref 12.1–15.2)
PDW BLD-RTO: 16.7 % (ref 12.1–15.2)
PDW BLD-RTO: 17.2 % (ref 12.1–15.2)
PHOSPHORUS: 4.7 MG/DL (ref 2.5–4.5)
PLATELET # BLD: 261 K/UL (ref 140–450)
PLATELET # BLD: 266 K/UL (ref 140–450)
PLATELET # BLD: 288 K/UL (ref 140–450)
PLATELET # BLD: 296 K/UL (ref 140–450)
PLATELET # BLD: 296 K/UL (ref 140–450)
PLATELET # BLD: 300 K/UL (ref 140–450)
PLATELET # BLD: 305 K/UL (ref 140–450)
PLATELET # BLD: 307 K/UL (ref 140–450)
PLATELET # BLD: 311 K/UL (ref 140–450)
PLATELET # BLD: 317 K/UL (ref 140–450)
PLATELET # BLD: 376 K/UL (ref 140–450)
PLATELET # BLD: 389 K/UL (ref 140–450)
PLATELET # BLD: 444 K/UL (ref 130–400)
PLATELET # BLD: 480 K/UL (ref 140–450)
PLATELET # BLD: 498 K/UL (ref 130–400)
PLATELET ESTIMATE: ABNORMAL
PMV BLD AUTO: 7.1 FL (ref 6–12)
PMV BLD AUTO: 8.1 FL (ref 6–12)
PMV BLD AUTO: ABNORMAL FL (ref 6–12)
POTASSIUM SERPL-SCNC: 3.7 MMOL/L (ref 3.7–5.3)
POTASSIUM SERPL-SCNC: 3.8 MMOL/L (ref 3.7–5.3)
POTASSIUM SERPL-SCNC: 3.9 MMOL/L (ref 3.7–5.3)
POTASSIUM SERPL-SCNC: 3.9 MMOL/L (ref 3.7–5.3)
POTASSIUM SERPL-SCNC: 4 MMOL/L (ref 3.7–5.3)
POTASSIUM SERPL-SCNC: 4.1 MMOL/L (ref 3.7–5.3)
POTASSIUM SERPL-SCNC: 4.2 MMOL/L (ref 3.7–5.3)
POTASSIUM SERPL-SCNC: 4.3 MMOL/L (ref 3.7–5.3)
POTASSIUM SERPL-SCNC: 4.4 MMOL/L (ref 3.7–5.3)
POTASSIUM SERPL-SCNC: 4.4 MMOL/L (ref 3.7–5.3)
POTASSIUM SERPL-SCNC: 4.5 MMOL/L (ref 3.7–5.3)
POTASSIUM SERPL-SCNC: 4.6 MMOL/L (ref 3.7–5.3)
POTASSIUM SERPL-SCNC: 4.6 MMOL/L (ref 3.7–5.3)
POTASSIUM SERPL-SCNC: 4.7 MMOL/L (ref 3.7–5.3)
POTASSIUM SERPL-SCNC: 4.8 MMOL/L (ref 3.7–5.3)
POTASSIUM SERPL-SCNC: 4.9 MMOL/L (ref 3.7–5.3)
POTASSIUM SERPL-SCNC: 5 MMOL/L (ref 3.7–5.3)
POTASSIUM SERPL-SCNC: 5.1 MMOL/L (ref 3.7–5.3)
POTASSIUM SERPL-SCNC: 7.4 MMOL/L (ref 3.7–5.3)
PRO-BNP: 8840 PG/ML
PRO-BNP: ABNORMAL PG/ML
PRO-BNP: ABNORMAL PG/ML
PROTHROMBIN TIME: 10.3 SEC (ref 9–11.6)
PROTHROMBIN TIME: 11.4 SEC (ref 9.7–11.6)
PROTHROMBIN TIME: 11.4 SEC (ref 9–11.6)
PROTHROMBIN TIME: 12.1 SEC (ref 9–11.6)
PROTHROMBIN TIME: 13.3 SEC (ref 9–11.6)
RBC # BLD: 2.85 M/UL (ref 4.5–5.9)
RBC # BLD: 2.93 M/UL (ref 4.5–5.9)
RBC # BLD: 3.07 M/UL (ref 4.5–5.9)
RBC # BLD: 3.07 M/UL (ref 4.5–5.9)
RBC # BLD: 3.08 M/UL (ref 4.5–5.9)
RBC # BLD: 3.08 M/UL (ref 4.5–5.9)
RBC # BLD: 3.09 M/UL (ref 4.5–5.9)
RBC # BLD: 3.14 M/UL (ref 4.5–5.9)
RBC # BLD: 3.16 M/UL (ref 4.5–5.9)
RBC # BLD: 3.19 M/UL (ref 4.5–5.9)
RBC # BLD: 3.23 M/UL (ref 4.5–5.9)
RBC # BLD: 3.29 M/UL (ref 4.5–5.9)
RBC # BLD: 3.49 M/UL (ref 4.5–5.9)
RBC # BLD: 3.93 M/UL (ref 4.5–5.9)
RBC # BLD: 4.04 M/UL (ref 4.5–5.9)
RBC # BLD: ABNORMAL 10*6/UL
RETIC %: 2.7 % (ref 0.5–2)
RETIC %: 3 % (ref 0.5–2)
RETIC HEMOGLOBIN: ABNORMAL PG (ref 28.2–35.7)
RETIC HEMOGLOBIN: ABNORMAL PG (ref 28.2–35.7)
SEG NEUTROPHILS: 67 % (ref 39–75)
SEG NEUTROPHILS: 68 % (ref 36–66)
SEG NEUTROPHILS: 68 % (ref 39–75)
SEG NEUTROPHILS: 70 % (ref 39–75)
SEG NEUTROPHILS: 73 % (ref 39–75)
SEG NEUTROPHILS: 75 % (ref 39–75)
SEG NEUTROPHILS: 76 % (ref 39–75)
SEG NEUTROPHILS: 80 % (ref 39–75)
SEG NEUTROPHILS: 80 % (ref 39–75)
SEG NEUTROPHILS: 85 % (ref 39–75)
SEG NEUTROPHILS: 87 % (ref 39–75)
SEGMENTED NEUTROPHILS ABSOLUTE COUNT: 3.9 K/UL (ref 1.8–7.7)
SEGMENTED NEUTROPHILS ABSOLUTE COUNT: 4 K/UL (ref 2.1–6.5)
SEGMENTED NEUTROPHILS ABSOLUTE COUNT: 4.4 K/UL (ref 2.1–6.5)
SEGMENTED NEUTROPHILS ABSOLUTE COUNT: 4.9 K/UL (ref 2.1–6.5)
SEGMENTED NEUTROPHILS ABSOLUTE COUNT: 5 K/UL (ref 2.1–6.5)
SEGMENTED NEUTROPHILS ABSOLUTE COUNT: 5.1 K/UL (ref 2.1–6.5)
SEGMENTED NEUTROPHILS ABSOLUTE COUNT: 5.1 K/UL (ref 2.1–6.5)
SEGMENTED NEUTROPHILS ABSOLUTE COUNT: 5.7 K/UL (ref 2.1–6.5)
SEGMENTED NEUTROPHILS ABSOLUTE COUNT: 6 K/UL (ref 2.1–6.5)
SEGMENTED NEUTROPHILS ABSOLUTE COUNT: 6.88 K/UL (ref 2.1–6.5)
SEGMENTED NEUTROPHILS ABSOLUTE COUNT: 7.38 K/UL (ref 2.1–6.5)
SEGMENTED NEUTROPHILS ABSOLUTE COUNT: 7.5 K/UL (ref 2.1–6.5)
SEGMENTED NEUTROPHILS ABSOLUTE COUNT: 7.5 K/UL (ref 2.1–6.5)
SODIUM BLD-SCNC: 129 MMOL/L (ref 135–144)
SODIUM BLD-SCNC: 131 MMOL/L (ref 135–144)
SODIUM BLD-SCNC: 133 MMOL/L (ref 135–144)
SODIUM BLD-SCNC: 134 MMOL/L (ref 135–144)
SODIUM BLD-SCNC: 135 MMOL/L (ref 135–144)
SODIUM BLD-SCNC: 136 MMOL/L (ref 135–144)
SODIUM BLD-SCNC: 137 MMOL/L (ref 135–144)
SODIUM BLD-SCNC: 138 MMOL/L (ref 135–144)
SODIUM BLD-SCNC: 139 MMOL/L (ref 135–144)
SODIUM BLD-SCNC: 140 MMOL/L (ref 135–144)
SODIUM BLD-SCNC: 140 MMOL/L (ref 135–144)
SODIUM BLD-SCNC: 141 MMOL/L (ref 135–144)
SODIUM BLD-SCNC: 142 MMOL/L (ref 135–144)
SODIUM BLD-SCNC: 144 MMOL/L (ref 135–144)
SPECIMEN DESCRIPTION: NORMAL
SPECIMEN DESCRIPTION: NORMAL
STATUS: NORMAL
STATUS: NORMAL
TIME, STOOL #1: 842
TIME, STOOL #2: NORMAL
TIME, STOOL #3: NORMAL
TISSUE TRANSGLUTAMINASE IGA: 0.7 U/ML
TOTAL IRON BINDING CAPACITY: 277 UG/DL (ref 250–450)
TOTAL IRON BINDING CAPACITY: 325 UG/DL (ref 250–450)
TOTAL PROTEIN: 7 G/DL (ref 6.4–8.3)
TOTAL PROTEIN: 7 G/DL (ref 6.4–8.3)
TOTAL PROTEIN: 7.4 G/DL (ref 6.4–8.3)
TRIGL SERPL-MCNC: 326 MG/DL
TROPONIN INTERP: ABNORMAL
TROPONIN INTERP: NORMAL
TROPONIN T: 0.09 NG/ML
TROPONIN T: <0.03 NG/ML
TROPONIN, HIGH SENSITIVITY: ABNORMAL NG/L (ref 0–22)
TROPONIN, HIGH SENSITIVITY: NORMAL NG/L (ref 0–22)
TSH SERPL DL<=0.05 MIU/L-ACNC: 3.74 MIU/L (ref 0.3–5)
UNSATURATED IRON BINDING CAPACITY: 237 UG/DL (ref 112–347)
UNSATURATED IRON BINDING CAPACITY: 296 UG/DL (ref 112–347)
VANCOMYCIN TROUGH DATE LAST DOSE: ABNORMAL
VANCOMYCIN TROUGH DATE LAST DOSE: NORMAL
VANCOMYCIN TROUGH DATE LAST DOSE: NORMAL
VANCOMYCIN TROUGH DOSE AMOUNT: ABNORMAL
VANCOMYCIN TROUGH DOSE AMOUNT: NORMAL
VANCOMYCIN TROUGH DOSE AMOUNT: NORMAL
VANCOMYCIN TROUGH TIME LAST DOSE: 2300
VANCOMYCIN TROUGH TIME LAST DOSE: ABNORMAL
VANCOMYCIN TROUGH TIME LAST DOSE: NORMAL
VANCOMYCIN TROUGH: 13.1 UG/ML (ref 10–20)
VANCOMYCIN TROUGH: 15.2 UG/ML (ref 10–20)
VANCOMYCIN TROUGH: <4 UG/ML (ref 10–20)
VITAMIN B-12: 248 PG/ML (ref 232–1245)
VITAMIN B-12: 390 PG/ML (ref 232–1245)
VITAMIN D 25-HYDROXY: 32.7 NG/ML (ref 30–100)
VLDLC SERPL CALC-MCNC: ABNORMAL MG/DL (ref 1–30)
WBC # BLD: 5.7 K/UL (ref 3.5–11)
WBC # BLD: 5.8 K/UL (ref 3.5–11)
WBC # BLD: 6.5 K/UL (ref 3.5–11)
WBC # BLD: 6.5 K/UL (ref 3.5–11)
WBC # BLD: 6.7 K/UL (ref 3.5–11)
WBC # BLD: 7 K/UL (ref 3.5–11)
WBC # BLD: 7.5 K/UL (ref 3.5–11)
WBC # BLD: 7.6 K/UL (ref 3.5–11)
WBC # BLD: 8 K/UL (ref 3.5–11)
WBC # BLD: 8.5 K/UL (ref 3.5–11)
WBC # BLD: 8.6 K/UL (ref 3.5–11)
WBC # BLD: 8.9 K/UL (ref 3.5–11)
WBC # BLD: 9.4 K/UL (ref 3.5–11)
WBC # BLD: 9.6 K/UL (ref 3.5–11)
WBC # BLD: 9.6 K/UL (ref 3.5–11)
WBC # BLD: ABNORMAL 10*3/UL

## 2019-01-01 PROCEDURE — 2700000000 HC OXYGEN THERAPY PER DAY

## 2019-01-01 PROCEDURE — 6370000000 HC RX 637 (ALT 250 FOR IP): Performed by: INTERNAL MEDICINE

## 2019-01-01 PROCEDURE — 1200000000 HC SEMI PRIVATE

## 2019-01-01 PROCEDURE — 2580000003 HC RX 258: Performed by: NURSE PRACTITIONER

## 2019-01-01 PROCEDURE — G8598 ASA/ANTIPLAT THER USED: HCPCS | Performed by: INTERNAL MEDICINE

## 2019-01-01 PROCEDURE — 6360000002 HC RX W HCPCS: Performed by: INTERNAL MEDICINE

## 2019-01-01 PROCEDURE — 97110 THERAPEUTIC EXERCISES: CPT

## 2019-01-01 PROCEDURE — 6360000002 HC RX W HCPCS: Performed by: NURSE PRACTITIONER

## 2019-01-01 PROCEDURE — 2500000003 HC RX 250 WO HCPCS: Performed by: INTERNAL MEDICINE

## 2019-01-01 PROCEDURE — 94761 N-INVAS EAR/PLS OXIMETRY MLT: CPT

## 2019-01-01 PROCEDURE — 2580000003 HC RX 258: Performed by: INTERNAL MEDICINE

## 2019-01-01 PROCEDURE — 76770 US EXAM ABDO BACK WALL COMP: CPT

## 2019-01-01 PROCEDURE — 99225 PR SBSQ OBSERVATION CARE/DAY 25 MINUTES: CPT | Performed by: INTERNAL MEDICINE

## 2019-01-01 PROCEDURE — 80053 COMPREHEN METABOLIC PANEL: CPT

## 2019-01-01 PROCEDURE — 82947 ASSAY GLUCOSE BLOOD QUANT: CPT

## 2019-01-01 PROCEDURE — 36415 COLL VENOUS BLD VENIPUNCTURE: CPT

## 2019-01-01 PROCEDURE — C9113 INJ PANTOPRAZOLE SODIUM, VIA: HCPCS | Performed by: NURSE PRACTITIONER

## 2019-01-01 PROCEDURE — 1111F DSCHRG MED/CURRENT MED MERGE: CPT | Performed by: FAMILY MEDICINE

## 2019-01-01 PROCEDURE — 97116 GAIT TRAINING THERAPY: CPT

## 2019-01-01 PROCEDURE — 6370000000 HC RX 637 (ALT 250 FOR IP): Performed by: NURSE PRACTITIONER

## 2019-01-01 PROCEDURE — 6360000002 HC RX W HCPCS: Performed by: FAMILY MEDICINE

## 2019-01-01 PROCEDURE — 80048 BASIC METABOLIC PNL TOTAL CA: CPT

## 2019-01-01 PROCEDURE — 85730 THROMBOPLASTIN TIME PARTIAL: CPT

## 2019-01-01 PROCEDURE — 97530 THERAPEUTIC ACTIVITIES: CPT

## 2019-01-01 PROCEDURE — 94669 MECHANICAL CHEST WALL OSCILL: CPT

## 2019-01-01 PROCEDURE — 1250000000 HC SEMI PRIVATE HOSPICE R&B

## 2019-01-01 PROCEDURE — 93798 PHYS/QHP OP CAR RHAB W/ECG: CPT

## 2019-01-01 PROCEDURE — 99211 OFF/OP EST MAY X REQ PHY/QHP: CPT

## 2019-01-01 PROCEDURE — 80202 ASSAY OF VANCOMYCIN: CPT

## 2019-01-01 PROCEDURE — 85018 HEMOGLOBIN: CPT

## 2019-01-01 PROCEDURE — 83735 ASSAY OF MAGNESIUM: CPT

## 2019-01-01 PROCEDURE — 83516 IMMUNOASSAY NONANTIBODY: CPT

## 2019-01-01 PROCEDURE — 93971 EXTREMITY STUDY: CPT

## 2019-01-01 PROCEDURE — A9560 TC99M LABELED RBC: HCPCS | Performed by: INTERNAL MEDICINE

## 2019-01-01 PROCEDURE — 85025 COMPLETE CBC W/AUTO DIFF WBC: CPT

## 2019-01-01 PROCEDURE — 80051 ELECTROLYTE PANEL: CPT

## 2019-01-01 PROCEDURE — 99283 EMERGENCY DEPT VISIT LOW MDM: CPT

## 2019-01-01 PROCEDURE — 85610 PROTHROMBIN TIME: CPT

## 2019-01-01 PROCEDURE — 96375 TX/PRO/DX INJ NEW DRUG ADDON: CPT

## 2019-01-01 PROCEDURE — 82607 VITAMIN B-12: CPT

## 2019-01-01 PROCEDURE — 99214 OFFICE O/P EST MOD 30 MIN: CPT | Performed by: INTERNAL MEDICINE

## 2019-01-01 PROCEDURE — 1123F ACP DISCUSS/DSCN MKR DOCD: CPT | Performed by: INTERNAL MEDICINE

## 2019-01-01 PROCEDURE — 71045 X-RAY EXAM CHEST 1 VIEW: CPT

## 2019-01-01 PROCEDURE — 99232 SBSQ HOSP IP/OBS MODERATE 35: CPT | Performed by: INTERNAL MEDICINE

## 2019-01-01 PROCEDURE — G8427 DOCREV CUR MEDS BY ELIG CLIN: HCPCS | Performed by: INTERNAL MEDICINE

## 2019-01-01 PROCEDURE — 97162 PT EVAL MOD COMPLEX 30 MIN: CPT

## 2019-01-01 PROCEDURE — G0328 FECAL BLOOD SCRN IMMUNOASSAY: HCPCS

## 2019-01-01 PROCEDURE — 6370000000 HC RX 637 (ALT 250 FOR IP): Performed by: FAMILY MEDICINE

## 2019-01-01 PROCEDURE — 82310 ASSAY OF CALCIUM: CPT

## 2019-01-01 PROCEDURE — 99238 HOSP IP/OBS DSCHRG MGMT 30/<: CPT | Performed by: INTERNAL MEDICINE

## 2019-01-01 PROCEDURE — 85045 AUTOMATED RETICULOCYTE COUNT: CPT

## 2019-01-01 PROCEDURE — 99231 SBSQ HOSP IP/OBS SF/LOW 25: CPT | Performed by: INTERNAL MEDICINE

## 2019-01-01 PROCEDURE — 94640 AIRWAY INHALATION TREATMENT: CPT

## 2019-01-01 PROCEDURE — APPNB15 APP NON BILLABLE TIME 0-15 MINS: Performed by: NURSE PRACTITIONER

## 2019-01-01 PROCEDURE — 74176 CT ABD & PELVIS W/O CONTRAST: CPT

## 2019-01-01 PROCEDURE — 99213 OFFICE O/P EST LOW 20 MIN: CPT | Performed by: INTERNAL MEDICINE

## 2019-01-01 PROCEDURE — 84484 ASSAY OF TROPONIN QUANT: CPT

## 2019-01-01 PROCEDURE — 83880 ASSAY OF NATRIURETIC PEPTIDE: CPT

## 2019-01-01 PROCEDURE — 97166 OT EVAL MOD COMPLEX 45 MIN: CPT

## 2019-01-01 PROCEDURE — 83036 HEMOGLOBIN GLYCOSYLATED A1C: CPT

## 2019-01-01 PROCEDURE — 82565 ASSAY OF CREATININE: CPT

## 2019-01-01 PROCEDURE — 83540 ASSAY OF IRON: CPT

## 2019-01-01 PROCEDURE — 99285 EMERGENCY DEPT VISIT HI MDM: CPT

## 2019-01-01 PROCEDURE — 94668 MNPJ CHEST WALL SBSQ: CPT

## 2019-01-01 PROCEDURE — 1101F PT FALLS ASSESS-DOCD LE1/YR: CPT | Performed by: INTERNAL MEDICINE

## 2019-01-01 PROCEDURE — 4040F PNEUMOC VAC/ADMIN/RCVD: CPT | Performed by: INTERNAL MEDICINE

## 2019-01-01 PROCEDURE — G8417 CALC BMI ABV UP PARAM F/U: HCPCS | Performed by: INTERNAL MEDICINE

## 2019-01-01 PROCEDURE — 84100 ASSAY OF PHOSPHORUS: CPT

## 2019-01-01 PROCEDURE — 99495 TRANSJ CARE MGMT MOD F2F 14D: CPT | Performed by: FAMILY MEDICINE

## 2019-01-01 PROCEDURE — 82746 ASSAY OF FOLIC ACID SERUM: CPT

## 2019-01-01 PROCEDURE — 1200000002 HC SEMI PRIVATE SWING BED

## 2019-01-01 PROCEDURE — 83550 IRON BINDING TEST: CPT

## 2019-01-01 PROCEDURE — 86901 BLOOD TYPING SEROLOGIC RH(D): CPT

## 2019-01-01 PROCEDURE — 93005 ELECTROCARDIOGRAM TRACING: CPT

## 2019-01-01 PROCEDURE — 85027 COMPLETE CBC AUTOMATED: CPT

## 2019-01-01 PROCEDURE — 93306 TTE W/DOPPLER COMPLETE: CPT

## 2019-01-01 PROCEDURE — 96374 THER/PROPH/DIAG INJ IV PUSH: CPT

## 2019-01-01 PROCEDURE — 97165 OT EVAL LOW COMPLEX 30 MIN: CPT

## 2019-01-01 PROCEDURE — 86900 BLOOD TYPING SEROLOGIC ABO: CPT

## 2019-01-01 PROCEDURE — APPSS30 APP SPLIT SHARED TIME 16-30 MINUTES: Performed by: NURSE PRACTITIONER

## 2019-01-01 PROCEDURE — 84520 ASSAY OF UREA NITROGEN: CPT

## 2019-01-01 PROCEDURE — 1036F TOBACCO NON-USER: CPT | Performed by: INTERNAL MEDICINE

## 2019-01-01 PROCEDURE — 3430000000 HC RX DIAGNOSTIC RADIOPHARMACEUTICAL: Performed by: INTERNAL MEDICINE

## 2019-01-01 PROCEDURE — G8482 FLU IMMUNIZE ORDER/ADMIN: HCPCS | Performed by: INTERNAL MEDICINE

## 2019-01-01 PROCEDURE — 6360000002 HC RX W HCPCS

## 2019-01-01 PROCEDURE — 94664 DEMO&/EVAL PT USE INHALER: CPT

## 2019-01-01 PROCEDURE — 96367 TX/PROPH/DG ADDL SEQ IV INF: CPT

## 2019-01-01 PROCEDURE — 84443 ASSAY THYROID STIM HORMONE: CPT

## 2019-01-01 PROCEDURE — 82306 VITAMIN D 25 HYDROXY: CPT

## 2019-01-01 PROCEDURE — 78472 GATED HEART PLANAR SINGLE: CPT

## 2019-01-01 PROCEDURE — 96365 THER/PROPH/DIAG IV INF INIT: CPT

## 2019-01-01 PROCEDURE — 99239 HOSP IP/OBS DSCHRG MGMT >30: CPT | Performed by: INTERNAL MEDICINE

## 2019-01-01 PROCEDURE — 97530 THERAPEUTIC ACTIVITIES: CPT | Performed by: NURSE PRACTITIONER

## 2019-01-01 PROCEDURE — 86850 RBC ANTIBODY SCREEN: CPT

## 2019-01-01 PROCEDURE — APPNB30 APP NON BILLABLE TIME 0-30 MINS: Performed by: NURSE PRACTITIONER

## 2019-01-01 PROCEDURE — 94667 MNPJ CHEST WALL 1ST: CPT

## 2019-01-01 PROCEDURE — 71046 X-RAY EXAM CHEST 2 VIEWS: CPT

## 2019-01-01 PROCEDURE — 1111F DSCHRG MED/CURRENT MED MERGE: CPT | Performed by: PHARMACIST

## 2019-01-01 PROCEDURE — 1111F DSCHRG MED/CURRENT MED MERGE: CPT | Performed by: INTERNAL MEDICINE

## 2019-01-01 PROCEDURE — APPSS60 APP SPLIT SHARED TIME 46-60 MINUTES: Performed by: NURSE PRACTITIONER

## 2019-01-01 PROCEDURE — 99223 1ST HOSP IP/OBS HIGH 75: CPT | Performed by: INTERNAL MEDICINE

## 2019-01-01 PROCEDURE — G8428 CUR MEDS NOT DOCUMENT: HCPCS | Performed by: INTERNAL MEDICINE

## 2019-01-01 PROCEDURE — 82728 ASSAY OF FERRITIN: CPT

## 2019-01-01 PROCEDURE — 51702 INSERT TEMP BLADDER CATH: CPT

## 2019-01-01 PROCEDURE — 99224 PR SBSQ OBSERVATION CARE/DAY 15 MINUTES: CPT | Performed by: INTERNAL MEDICINE

## 2019-01-01 PROCEDURE — 99222 1ST HOSP IP/OBS MODERATE 55: CPT | Performed by: INTERNAL MEDICINE

## 2019-01-01 PROCEDURE — 97535 SELF CARE MNGMENT TRAINING: CPT

## 2019-01-01 PROCEDURE — 80061 LIPID PANEL: CPT

## 2019-01-01 PROCEDURE — 2580000003 HC RX 258: Performed by: FAMILY MEDICINE

## 2019-01-01 RX ORDER — LORAZEPAM 0.5 MG/1
0.5 TABLET ORAL EVERY 6 HOURS PRN
Status: DISCONTINUED | OUTPATIENT
Start: 2019-01-01 | End: 2019-01-01 | Stop reason: HOSPADM

## 2019-01-01 RX ORDER — DOBUTAMINE HYDROCHLORIDE 400 MG/100ML
2.5 INJECTION INTRAVENOUS CONTINUOUS
Status: DISCONTINUED | OUTPATIENT
Start: 2019-01-01 | End: 2019-01-01

## 2019-01-01 RX ORDER — TRAMADOL HYDROCHLORIDE 50 MG/1
50 TABLET ORAL EVERY 6 HOURS PRN
Status: DISCONTINUED | OUTPATIENT
Start: 2019-01-01 | End: 2019-01-01

## 2019-01-01 RX ORDER — SODIUM CHLORIDE 0.9 % (FLUSH) 0.9 %
10 SYRINGE (ML) INJECTION PRN
Status: DISCONTINUED | OUTPATIENT
Start: 2019-01-01 | End: 2019-01-01 | Stop reason: HOSPADM

## 2019-01-01 RX ORDER — LORAZEPAM 2 MG/ML
1 CONCENTRATE ORAL EVERY 4 HOURS PRN
Status: DISCONTINUED | OUTPATIENT
Start: 2019-01-01 | End: 2019-01-01 | Stop reason: HOSPADM

## 2019-01-01 RX ORDER — SIMVASTATIN 40 MG
40 TABLET ORAL NIGHTLY
Status: DISCONTINUED | OUTPATIENT
Start: 2019-01-01 | End: 2019-01-01 | Stop reason: HOSPADM

## 2019-01-01 RX ORDER — TRAMADOL HYDROCHLORIDE 50 MG/1
50 TABLET ORAL EVERY 4 HOURS PRN
Status: DISCONTINUED | OUTPATIENT
Start: 2019-01-01 | End: 2019-01-01 | Stop reason: HOSPADM

## 2019-01-01 RX ORDER — ONDANSETRON 2 MG/ML
4 INJECTION INTRAMUSCULAR; INTRAVENOUS EVERY 6 HOURS PRN
Status: DISCONTINUED | OUTPATIENT
Start: 2019-01-01 | End: 2019-01-01 | Stop reason: HOSPADM

## 2019-01-01 RX ORDER — DULOXETIN HYDROCHLORIDE 20 MG/1
20 CAPSULE, DELAYED RELEASE ORAL DAILY
Status: DISCONTINUED | OUTPATIENT
Start: 2019-01-01 | End: 2019-01-01 | Stop reason: HOSPADM

## 2019-01-01 RX ORDER — SODIUM BICARBONATE 650 MG/1
650 TABLET ORAL 2 TIMES DAILY
Status: SHIPPED | COMMUNITY
Start: 2019-01-01 | End: 2019-01-01 | Stop reason: SDUPTHER

## 2019-01-01 RX ORDER — CARVEDILOL 25 MG/1
25 TABLET ORAL 2 TIMES DAILY
Qty: 60 TABLET | Refills: 3 | Status: SHIPPED | OUTPATIENT
Start: 2019-01-01

## 2019-01-01 RX ORDER — NICOTINE POLACRILEX 4 MG
15 LOZENGE BUCCAL PRN
Status: DISCONTINUED | OUTPATIENT
Start: 2019-01-01 | End: 2019-01-01 | Stop reason: HOSPADM

## 2019-01-01 RX ORDER — ASPIRIN 81 MG/1
81 TABLET ORAL DAILY
Status: CANCELLED | OUTPATIENT
Start: 2019-01-01

## 2019-01-01 RX ORDER — HYDROCODONE BITARTRATE AND ACETAMINOPHEN 5; 325 MG/1; MG/1
1 TABLET ORAL EVERY 4 HOURS PRN
Status: DISCONTINUED | OUTPATIENT
Start: 2019-01-01 | End: 2019-01-01 | Stop reason: HOSPADM

## 2019-01-01 RX ORDER — MORPHINE SULFATE 10 MG/.5ML
0.5 SOLUTION ORAL
Status: DISCONTINUED | OUTPATIENT
Start: 2019-01-01 | End: 2019-01-01 | Stop reason: HOSPADM

## 2019-01-01 RX ORDER — SPIRONOLACTONE 25 MG/1
25 TABLET ORAL DAILY
Status: DISCONTINUED | OUTPATIENT
Start: 2019-01-01 | End: 2019-01-01 | Stop reason: HOSPADM

## 2019-01-01 RX ORDER — SODIUM CHLORIDE 0.9 % (FLUSH) 0.9 %
10 SYRINGE (ML) INJECTION PRN
Status: DISCONTINUED | OUTPATIENT
Start: 2019-01-01 | End: 2019-01-01

## 2019-01-01 RX ORDER — GEMFIBROZIL 600 MG/1
600 TABLET, FILM COATED ORAL DAILY
Status: DISCONTINUED | OUTPATIENT
Start: 2019-01-01 | End: 2019-01-01 | Stop reason: HOSPADM

## 2019-01-01 RX ORDER — SODIUM BICARBONATE 650 MG/1
1300 TABLET ORAL 2 TIMES DAILY
Status: DISCONTINUED | OUTPATIENT
Start: 2019-01-01 | End: 2019-01-01 | Stop reason: HOSPADM

## 2019-01-01 RX ORDER — BUMETANIDE 0.25 MG/ML
2 INJECTION, SOLUTION INTRAMUSCULAR; INTRAVENOUS 2 TIMES DAILY
Status: DISCONTINUED | OUTPATIENT
Start: 2019-01-01 | End: 2019-01-01

## 2019-01-01 RX ORDER — LEVOFLOXACIN 5 MG/ML
750 INJECTION, SOLUTION INTRAVENOUS
Status: DISCONTINUED | OUTPATIENT
Start: 2019-01-01 | End: 2019-01-01 | Stop reason: HOSPADM

## 2019-01-01 RX ORDER — FUROSEMIDE 10 MG/ML
40 INJECTION INTRAMUSCULAR; INTRAVENOUS 2 TIMES DAILY
Status: DISCONTINUED | OUTPATIENT
Start: 2019-01-01 | End: 2019-01-01

## 2019-01-01 RX ORDER — FAMOTIDINE 20 MG/1
20 TABLET, FILM COATED ORAL DAILY
Status: DISCONTINUED | OUTPATIENT
Start: 2019-01-01 | End: 2019-01-01 | Stop reason: HOSPADM

## 2019-01-01 RX ORDER — GUAIFENESIN 600 MG/1
600 TABLET, EXTENDED RELEASE ORAL 2 TIMES DAILY
Status: DISCONTINUED | OUTPATIENT
Start: 2019-01-01 | End: 2019-01-01 | Stop reason: HOSPADM

## 2019-01-01 RX ORDER — BUMETANIDE 1 MG/1
1 TABLET ORAL DAILY
Status: DISCONTINUED | OUTPATIENT
Start: 2019-01-01 | End: 2019-01-01 | Stop reason: HOSPADM

## 2019-01-01 RX ORDER — SODIUM CHLORIDE 0.9 % (FLUSH) 0.9 %
10 SYRINGE (ML) INJECTION EVERY 12 HOURS SCHEDULED
Status: CANCELLED | OUTPATIENT
Start: 2019-01-01

## 2019-01-01 RX ORDER — LANCETS
EACH MISCELLANEOUS
Qty: 100 EACH | Refills: 3 | Status: SHIPPED | OUTPATIENT
Start: 2019-01-01

## 2019-01-01 RX ORDER — METHYLPREDNISOLONE SODIUM SUCCINATE 40 MG/ML
40 INJECTION, POWDER, LYOPHILIZED, FOR SOLUTION INTRAMUSCULAR; INTRAVENOUS EVERY 8 HOURS
Status: DISCONTINUED | OUTPATIENT
Start: 2019-01-01 | End: 2019-01-01

## 2019-01-01 RX ORDER — BUMETANIDE 1 MG/1
1 TABLET ORAL DAILY
Qty: 30 TABLET | Refills: 3 | Status: SHIPPED | OUTPATIENT
Start: 2019-01-01 | End: 2019-01-01 | Stop reason: SDUPTHER

## 2019-01-01 RX ORDER — DULOXETIN HYDROCHLORIDE 20 MG/1
20 CAPSULE, DELAYED RELEASE ORAL DAILY
Status: CANCELLED | OUTPATIENT
Start: 2019-01-01

## 2019-01-01 RX ORDER — HEPARIN SODIUM 5000 [USP'U]/ML
5000 INJECTION, SOLUTION INTRAVENOUS; SUBCUTANEOUS 2 TIMES DAILY
Status: DISCONTINUED | OUTPATIENT
Start: 2019-01-01 | End: 2019-01-01 | Stop reason: HOSPADM

## 2019-01-01 RX ORDER — HYDRALAZINE HYDROCHLORIDE 25 MG/1
100 TABLET, FILM COATED ORAL EVERY 8 HOURS SCHEDULED
Status: DISCONTINUED | OUTPATIENT
Start: 2019-01-01 | End: 2019-01-01 | Stop reason: HOSPADM

## 2019-01-01 RX ORDER — VANCOMYCIN HYDROCHLORIDE 500 MG/10ML
INJECTION, POWDER, LYOPHILIZED, FOR SOLUTION INTRAVENOUS
Status: DISPENSED
Start: 2019-01-01 | End: 2019-01-01

## 2019-01-01 RX ORDER — IPRATROPIUM BROMIDE AND ALBUTEROL SULFATE 2.5; .5 MG/3ML; MG/3ML
1 SOLUTION RESPIRATORY (INHALATION) EVERY 4 HOURS PRN
Status: DISCONTINUED | OUTPATIENT
Start: 2019-01-01 | End: 2019-01-01

## 2019-01-01 RX ORDER — NIFEDIPINE 30 MG/1
60 TABLET, EXTENDED RELEASE ORAL 2 TIMES DAILY
Status: CANCELLED | OUTPATIENT
Start: 2019-01-01

## 2019-01-01 RX ORDER — FLUTICASONE PROPIONATE 50 MCG
1 SPRAY, SUSPENSION (ML) NASAL DAILY
Status: CANCELLED | OUTPATIENT
Start: 2019-01-01

## 2019-01-01 RX ORDER — ISOSORBIDE MONONITRATE 30 MG/1
60 TABLET, EXTENDED RELEASE ORAL 2 TIMES DAILY
Status: CANCELLED | OUTPATIENT
Start: 2019-01-01

## 2019-01-01 RX ORDER — IPRATROPIUM BROMIDE AND ALBUTEROL SULFATE 2.5; .5 MG/3ML; MG/3ML
1 SOLUTION RESPIRATORY (INHALATION)
Status: DISCONTINUED | OUTPATIENT
Start: 2019-01-01 | End: 2019-01-01 | Stop reason: HOSPADM

## 2019-01-01 RX ORDER — FLUTICASONE PROPIONATE 50 MCG
1 SPRAY, SUSPENSION (ML) NASAL DAILY
Status: DISCONTINUED | OUTPATIENT
Start: 2019-01-01 | End: 2019-01-01 | Stop reason: HOSPADM

## 2019-01-01 RX ORDER — SIMVASTATIN 20 MG
40 TABLET ORAL EVERY EVENING
Status: CANCELLED | OUTPATIENT
Start: 2019-01-01

## 2019-01-01 RX ORDER — LEVOFLOXACIN 5 MG/ML
750 INJECTION, SOLUTION INTRAVENOUS
Status: CANCELLED | OUTPATIENT
Start: 2019-01-01

## 2019-01-01 RX ORDER — LOSARTAN POTASSIUM 25 MG/1
25 TABLET ORAL NIGHTLY
Qty: 30 TABLET | Refills: 3 | Status: SHIPPED | OUTPATIENT
Start: 2019-01-01 | End: 2019-01-01 | Stop reason: SDUPTHER

## 2019-01-01 RX ORDER — FERROUS SULFATE 325(65) MG
325 TABLET ORAL 2 TIMES DAILY
Status: DISCONTINUED | OUTPATIENT
Start: 2019-01-01 | End: 2019-01-01 | Stop reason: HOSPADM

## 2019-01-01 RX ORDER — BUMETANIDE 1 MG/1
2 TABLET ORAL 2 TIMES DAILY
Status: CANCELLED | OUTPATIENT
Start: 2019-01-01

## 2019-01-01 RX ORDER — GEMFIBROZIL 600 MG/1
600 TABLET, FILM COATED ORAL DAILY
Status: DISCONTINUED | OUTPATIENT
Start: 2019-01-01 | End: 2019-01-01

## 2019-01-01 RX ORDER — NICOTINE POLACRILEX 4 MG
15 LOZENGE BUCCAL PRN
Status: CANCELLED | OUTPATIENT
Start: 2019-01-01

## 2019-01-01 RX ORDER — M-VIT,TX,IRON,MINS/CALC/FOLIC 27MG-0.4MG
1 TABLET ORAL DAILY
Status: DISCONTINUED | OUTPATIENT
Start: 2019-01-01 | End: 2019-01-01 | Stop reason: HOSPADM

## 2019-01-01 RX ORDER — LANOLIN ALCOHOL/MO/W.PET/CERES
325 CREAM (GRAM) TOPICAL 2 TIMES DAILY WITH MEALS
Status: DISCONTINUED | OUTPATIENT
Start: 2019-01-01 | End: 2019-01-01 | Stop reason: HOSPADM

## 2019-01-01 RX ORDER — HEPARIN SODIUM 5000 [USP'U]/ML
5000 INJECTION, SOLUTION INTRAVENOUS; SUBCUTANEOUS 2 TIMES DAILY
Status: CANCELLED | OUTPATIENT
Start: 2019-01-01

## 2019-01-01 RX ORDER — LEVOFLOXACIN 250 MG/1
250 TABLET ORAL DAILY
Qty: 3 TABLET | Refills: 0 | Status: SHIPPED | OUTPATIENT
Start: 2019-01-01 | End: 2019-01-01

## 2019-01-01 RX ORDER — HYDRALAZINE HYDROCHLORIDE 50 MG/1
100 TABLET, FILM COATED ORAL 3 TIMES DAILY
Qty: 90 TABLET | Refills: 0 | Status: SHIPPED | OUTPATIENT
Start: 2019-01-01 | End: 2019-01-01 | Stop reason: SDUPTHER

## 2019-01-01 RX ORDER — DEXTROSE MONOHYDRATE 25 G/50ML
12.5 INJECTION, SOLUTION INTRAVENOUS PRN
Status: DISCONTINUED | OUTPATIENT
Start: 2019-01-01 | End: 2019-01-01 | Stop reason: HOSPADM

## 2019-01-01 RX ORDER — SIMVASTATIN 20 MG
40 TABLET ORAL EVERY EVENING
Status: DISCONTINUED | OUTPATIENT
Start: 2019-01-01 | End: 2019-01-01 | Stop reason: HOSPADM

## 2019-01-01 RX ORDER — CARVEDILOL 12.5 MG/1
12.5 TABLET ORAL 2 TIMES DAILY
Status: CANCELLED | OUTPATIENT
Start: 2019-01-01

## 2019-01-01 RX ORDER — FUROSEMIDE 10 MG/ML
40 INJECTION INTRAMUSCULAR; INTRAVENOUS ONCE
Status: COMPLETED | OUTPATIENT
Start: 2019-01-01 | End: 2019-01-01

## 2019-01-01 RX ORDER — ISOSORBIDE MONONITRATE 60 MG/1
TABLET, EXTENDED RELEASE ORAL
Refills: 5 | COMMUNITY
Start: 2019-01-01

## 2019-01-01 RX ORDER — LORAZEPAM 2 MG/ML
1 INJECTION INTRAMUSCULAR
Status: DISCONTINUED | OUTPATIENT
Start: 2019-01-01 | End: 2019-01-01

## 2019-01-01 RX ORDER — GLYCOPYRROLATE 1 MG/5 ML
0.1 SYRINGE (ML) INTRAVENOUS ONCE
Status: COMPLETED | OUTPATIENT
Start: 2019-01-01 | End: 2019-01-01

## 2019-01-01 RX ORDER — ACETAMINOPHEN 325 MG/1
650 TABLET ORAL EVERY 4 HOURS PRN
Status: DISCONTINUED | OUTPATIENT
Start: 2019-01-01 | End: 2019-01-01 | Stop reason: HOSPADM

## 2019-01-01 RX ORDER — SODIUM CHLORIDE 0.9 % (FLUSH) 0.9 %
10 SYRINGE (ML) INJECTION EVERY 12 HOURS SCHEDULED
Status: DISCONTINUED | OUTPATIENT
Start: 2019-01-01 | End: 2019-01-01

## 2019-01-01 RX ORDER — LOSARTAN POTASSIUM 25 MG/1
25 TABLET ORAL NIGHTLY
Status: DISCONTINUED | OUTPATIENT
Start: 2019-01-01 | End: 2019-01-01 | Stop reason: HOSPADM

## 2019-01-01 RX ORDER — NIFEDIPINE 30 MG/1
60 TABLET, EXTENDED RELEASE ORAL 2 TIMES DAILY
Status: DISCONTINUED | OUTPATIENT
Start: 2019-01-01 | End: 2019-01-01 | Stop reason: HOSPADM

## 2019-01-01 RX ORDER — INSULIN NPH HUM/REG INSULIN HM 70-30/ML
VIAL (ML) SUBCUTANEOUS
Qty: 30 ML | Refills: 5 | Status: ON HOLD | OUTPATIENT
Start: 2019-01-01 | End: 2019-01-01 | Stop reason: HOSPADM

## 2019-01-01 RX ORDER — CARVEDILOL 25 MG/1
TABLET ORAL
Qty: 60 TABLET | Refills: 11 | Status: ON HOLD | OUTPATIENT
Start: 2019-01-01 | End: 2019-01-01 | Stop reason: HOSPADM

## 2019-01-01 RX ORDER — HYDRALAZINE HYDROCHLORIDE 25 MG/1
100 TABLET, FILM COATED ORAL EVERY 8 HOURS SCHEDULED
Status: CANCELLED | OUTPATIENT
Start: 2019-01-01

## 2019-01-01 RX ORDER — SODIUM BICARBONATE 650 MG/1
650 TABLET ORAL 2 TIMES DAILY
Status: DISCONTINUED | OUTPATIENT
Start: 2019-01-01 | End: 2019-01-01

## 2019-01-01 RX ORDER — ZOLPIDEM TARTRATE 5 MG/1
5 TABLET ORAL NIGHTLY PRN
Status: CANCELLED | OUTPATIENT
Start: 2019-01-01

## 2019-01-01 RX ORDER — TRAMADOL HYDROCHLORIDE 50 MG/1
50 TABLET ORAL EVERY 4 HOURS PRN
Status: CANCELLED | OUTPATIENT
Start: 2019-01-01

## 2019-01-01 RX ORDER — SODIUM BICARBONATE 650 MG/1
1300 TABLET ORAL 2 TIMES DAILY
Qty: 60 TABLET | Refills: 1 | Status: SHIPPED | OUTPATIENT
Start: 2019-01-01 | End: 2019-01-01 | Stop reason: DRUGHIGH

## 2019-01-01 RX ORDER — DEXTROSE MONOHYDRATE 50 MG/ML
100 INJECTION, SOLUTION INTRAVENOUS PRN
Status: CANCELLED | OUTPATIENT
Start: 2019-01-01

## 2019-01-01 RX ORDER — GLYCOPYRROLATE 1 MG/5 ML
0.2 SYRINGE (ML) INTRAVENOUS EVERY 4 HOURS PRN
Status: DISCONTINUED | OUTPATIENT
Start: 2019-01-01 | End: 2019-01-01 | Stop reason: HOSPADM

## 2019-01-01 RX ORDER — IPRATROPIUM BROMIDE AND ALBUTEROL SULFATE 2.5; .5 MG/3ML; MG/3ML
1 SOLUTION RESPIRATORY (INHALATION) ONCE
Status: DISCONTINUED | OUTPATIENT
Start: 2019-01-01 | End: 2019-01-01

## 2019-01-01 RX ORDER — IPRATROPIUM BROMIDE AND ALBUTEROL SULFATE 2.5; .5 MG/3ML; MG/3ML
1 SOLUTION RESPIRATORY (INHALATION)
Status: CANCELLED | OUTPATIENT
Start: 2019-01-01

## 2019-01-01 RX ORDER — SODIUM CHLORIDE 9 MG/ML
INJECTION, SOLUTION INTRAVENOUS CONTINUOUS
Status: DISCONTINUED | OUTPATIENT
Start: 2019-01-01 | End: 2019-01-01

## 2019-01-01 RX ORDER — BUMETANIDE 1 MG/1
1 TABLET ORAL 2 TIMES DAILY
Qty: 60 TABLET | Refills: 11 | Status: ON HOLD | OUTPATIENT
Start: 2019-01-01 | End: 2019-01-01 | Stop reason: HOSPADM

## 2019-01-01 RX ORDER — BUMETANIDE 1 MG/1
2 TABLET ORAL 2 TIMES DAILY
Status: DISCONTINUED | OUTPATIENT
Start: 2019-01-01 | End: 2019-01-01 | Stop reason: HOSPADM

## 2019-01-01 RX ORDER — SODIUM CHLORIDE 0.9 % (FLUSH) 0.9 %
10 SYRINGE (ML) INJECTION EVERY 12 HOURS SCHEDULED
Status: DISCONTINUED | OUTPATIENT
Start: 2019-01-01 | End: 2019-01-01 | Stop reason: HOSPADM

## 2019-01-01 RX ORDER — ACETAMINOPHEN 325 MG/1
650 TABLET ORAL EVERY 4 HOURS PRN
Status: CANCELLED | OUTPATIENT
Start: 2019-01-01

## 2019-01-01 RX ORDER — ACETYLCYSTEINE 200 MG/ML
200 SOLUTION ORAL; RESPIRATORY (INHALATION) EVERY 4 HOURS PRN
Status: DISCONTINUED | OUTPATIENT
Start: 2019-01-01 | End: 2019-01-01

## 2019-01-01 RX ORDER — PROPOFOL 10 MG/ML
INJECTION, EMULSION INTRAVENOUS
Status: DISCONTINUED
Start: 2019-01-01 | End: 2019-01-01 | Stop reason: WASHOUT

## 2019-01-01 RX ORDER — LEVOFLOXACIN 5 MG/ML
750 INJECTION, SOLUTION INTRAVENOUS
Status: DISCONTINUED | OUTPATIENT
Start: 2019-01-01 | End: 2019-01-01

## 2019-01-01 RX ORDER — ZOLPIDEM TARTRATE 5 MG/1
5 TABLET ORAL NIGHTLY PRN
Status: DISCONTINUED | OUTPATIENT
Start: 2019-01-01 | End: 2019-01-01 | Stop reason: HOSPADM

## 2019-01-01 RX ORDER — ATROPINE SULFATE 10 MG/ML
2 SOLUTION/ DROPS OPHTHALMIC EVERY 4 HOURS PRN
Status: DISCONTINUED | OUTPATIENT
Start: 2019-01-01 | End: 2019-01-01

## 2019-01-01 RX ORDER — MORPHINE SULFATE 2 MG/ML
2 INJECTION, SOLUTION INTRAMUSCULAR; INTRAVENOUS
Status: DISCONTINUED | OUTPATIENT
Start: 2019-01-01 | End: 2019-01-01

## 2019-01-01 RX ORDER — HYDRALAZINE HYDROCHLORIDE 50 MG/1
50 TABLET, FILM COATED ORAL 3 TIMES DAILY
Qty: 270 TABLET | Refills: 1 | Status: SHIPPED | OUTPATIENT
Start: 2019-01-01 | End: 2019-01-01

## 2019-01-01 RX ORDER — MORPHINE SULFATE 2 MG/ML
2 INJECTION, SOLUTION INTRAMUSCULAR; INTRAVENOUS
Status: DISCONTINUED | OUTPATIENT
Start: 2019-01-01 | End: 2019-01-01 | Stop reason: HOSPADM

## 2019-01-01 RX ORDER — VANCOMYCIN HYDROCHLORIDE 1 G/20ML
INJECTION, POWDER, LYOPHILIZED, FOR SOLUTION INTRAVENOUS
Status: COMPLETED
Start: 2019-01-01 | End: 2019-01-01

## 2019-01-01 RX ORDER — BUMETANIDE 2 MG/1
2 TABLET ORAL 2 TIMES DAILY
Qty: 30 TABLET | Refills: 3 | Status: SHIPPED | OUTPATIENT
Start: 2019-01-01

## 2019-01-01 RX ORDER — MORPHINE SULFATE 4 MG/ML
4 INJECTION, SOLUTION INTRAMUSCULAR; INTRAVENOUS
Status: DISCONTINUED | OUTPATIENT
Start: 2019-01-01 | End: 2019-01-01 | Stop reason: HOSPADM

## 2019-01-01 RX ORDER — HEPARIN SODIUM 5000 [USP'U]/ML
5000 INJECTION, SOLUTION INTRAVENOUS; SUBCUTANEOUS 2 TIMES DAILY
Status: DISCONTINUED | OUTPATIENT
Start: 2019-01-01 | End: 2019-01-01

## 2019-01-01 RX ORDER — ISOSORBIDE MONONITRATE 30 MG/1
60 TABLET, EXTENDED RELEASE ORAL 2 TIMES DAILY
Status: DISCONTINUED | OUTPATIENT
Start: 2019-01-01 | End: 2019-01-01 | Stop reason: HOSPADM

## 2019-01-01 RX ORDER — DEXTROSE MONOHYDRATE 50 MG/ML
100 INJECTION, SOLUTION INTRAVENOUS PRN
Status: DISCONTINUED | OUTPATIENT
Start: 2019-01-01 | End: 2019-01-01 | Stop reason: HOSPADM

## 2019-01-01 RX ORDER — ALBUTEROL SULFATE 90 UG/1
2 AEROSOL, METERED RESPIRATORY (INHALATION)
Status: DISCONTINUED | OUTPATIENT
Start: 2019-01-01 | End: 2019-01-01

## 2019-01-01 RX ORDER — ASPIRIN 81 MG/1
81 TABLET ORAL DAILY
Status: DISCONTINUED | OUTPATIENT
Start: 2019-01-01 | End: 2019-01-01 | Stop reason: HOSPADM

## 2019-01-01 RX ORDER — DEXTROSE MONOHYDRATE 25 G/50ML
12.5 INJECTION, SOLUTION INTRAVENOUS PRN
Status: CANCELLED | OUTPATIENT
Start: 2019-01-01

## 2019-01-01 RX ORDER — LORAZEPAM 0.5 MG/1
0.5 TABLET ORAL EVERY 6 HOURS PRN
Status: CANCELLED | OUTPATIENT
Start: 2019-01-01

## 2019-01-01 RX ORDER — FAMOTIDINE 20 MG/1
1 TABLET, FILM COATED ORAL DAILY
Status: DISCONTINUED | OUTPATIENT
Start: 2019-01-01 | End: 2019-01-01

## 2019-01-01 RX ORDER — SODIUM BICARBONATE 650 MG/1
650 TABLET ORAL 2 TIMES DAILY
Status: CANCELLED | OUTPATIENT
Start: 2019-01-01

## 2019-01-01 RX ORDER — CARVEDILOL 12.5 MG/1
25 TABLET ORAL 2 TIMES DAILY
Status: DISCONTINUED | OUTPATIENT
Start: 2019-01-01 | End: 2019-01-01 | Stop reason: HOSPADM

## 2019-01-01 RX ORDER — METHYLPREDNISOLONE SODIUM SUCCINATE 40 MG/ML
40 INJECTION, POWDER, LYOPHILIZED, FOR SOLUTION INTRAMUSCULAR; INTRAVENOUS DAILY
Status: DISCONTINUED | OUTPATIENT
Start: 2019-01-01 | End: 2019-01-01

## 2019-01-01 RX ORDER — CARVEDILOL 12.5 MG/1
12.5 TABLET ORAL 2 TIMES DAILY
Status: DISCONTINUED | OUTPATIENT
Start: 2019-01-01 | End: 2019-01-01

## 2019-01-01 RX ORDER — ISOSORBIDE MONONITRATE 60 MG/1
60 TABLET, EXTENDED RELEASE ORAL 2 TIMES DAILY
Status: DISCONTINUED | OUTPATIENT
Start: 2019-01-01 | End: 2019-01-01 | Stop reason: HOSPADM

## 2019-01-01 RX ORDER — FAMOTIDINE 20 MG/1
TABLET, FILM COATED ORAL
Qty: 90 TABLET | Refills: 1 | Status: SHIPPED | OUTPATIENT
Start: 2019-01-01

## 2019-01-01 RX ORDER — HYDRALAZINE HYDROCHLORIDE 25 MG/1
25 TABLET, FILM COATED ORAL 3 TIMES DAILY
Qty: 450 TABLET | Refills: 3 | Status: SHIPPED | OUTPATIENT
Start: 2019-01-01 | End: 2019-01-01 | Stop reason: SDUPTHER

## 2019-01-01 RX ORDER — VANCOMYCIN HYDROCHLORIDE 1 G/20ML
INJECTION, POWDER, LYOPHILIZED, FOR SOLUTION INTRAVENOUS
Status: DISPENSED
Start: 2019-01-01 | End: 2019-01-01

## 2019-01-01 RX ORDER — BLOOD-GLUCOSE METER
KIT MISCELLANEOUS
Qty: 1 KIT | Refills: 0 | Status: SHIPPED | OUTPATIENT
Start: 2019-01-01 | End: 2019-01-01 | Stop reason: SDUPTHER

## 2019-01-01 RX ORDER — SPIRONOLACTONE 25 MG/1
25 TABLET ORAL DAILY
Qty: 30 TABLET | Refills: 11 | Status: ON HOLD | OUTPATIENT
Start: 2019-01-01 | End: 2019-01-01 | Stop reason: HOSPADM

## 2019-01-01 RX ORDER — HYDRALAZINE HYDROCHLORIDE 50 MG/1
100 TABLET, FILM COATED ORAL 3 TIMES DAILY
Status: DISCONTINUED | OUTPATIENT
Start: 2019-01-01 | End: 2019-01-01 | Stop reason: HOSPADM

## 2019-01-01 RX ORDER — LANOLIN ALCOHOL/MO/W.PET/CERES
3 CREAM (GRAM) TOPICAL NIGHTLY PRN
Status: DISCONTINUED | OUTPATIENT
Start: 2019-01-01 | End: 2019-01-01 | Stop reason: HOSPADM

## 2019-01-01 RX ORDER — CARVEDILOL 12.5 MG/1
12.5 TABLET ORAL 2 TIMES DAILY
Qty: 60 TABLET | Refills: 3 | Status: ON HOLD | OUTPATIENT
Start: 2019-01-01 | End: 2019-01-01 | Stop reason: HOSPADM

## 2019-01-01 RX ORDER — ATROPINE SULFATE 10 MG/ML
2 SOLUTION/ DROPS OPHTHALMIC
Status: DISCONTINUED | OUTPATIENT
Start: 2019-01-01 | End: 2019-01-01 | Stop reason: HOSPADM

## 2019-01-01 RX ORDER — SPIRONOLACTONE 25 MG/1
25 TABLET ORAL DAILY
COMMUNITY
End: 2019-01-01 | Stop reason: SDUPTHER

## 2019-01-01 RX ORDER — ONDANSETRON 2 MG/ML
4 INJECTION INTRAMUSCULAR; INTRAVENOUS EVERY 6 HOURS PRN
Status: DISCONTINUED | OUTPATIENT
Start: 2019-01-01 | End: 2019-01-01 | Stop reason: SDUPTHER

## 2019-01-01 RX ORDER — BLOOD-GLUCOSE METER
KIT MISCELLANEOUS
Qty: 1 KIT | Refills: 0 | Status: SHIPPED | OUTPATIENT
Start: 2019-01-01

## 2019-01-01 RX ORDER — HYDRALAZINE HYDROCHLORIDE 100 MG/1
TABLET, FILM COATED ORAL
Refills: 11 | COMMUNITY
Start: 2019-01-01

## 2019-01-01 RX ORDER — SPIRONOLACTONE 25 MG/1
25 TABLET ORAL DAILY
Status: CANCELLED | OUTPATIENT
Start: 2019-01-01

## 2019-01-01 RX ORDER — NIFEDIPINE 60 MG/1
TABLET, EXTENDED RELEASE ORAL
Qty: 180 TABLET | Refills: 1 | Status: SHIPPED | OUTPATIENT
Start: 2019-01-01

## 2019-01-01 RX ORDER — CARVEDILOL 25 MG/1
25 TABLET ORAL 2 TIMES DAILY
Status: DISCONTINUED | OUTPATIENT
Start: 2019-01-01 | End: 2019-01-01

## 2019-01-01 RX ORDER — ASCORBIC ACID 500 MG
500 TABLET ORAL DAILY
Status: CANCELLED | OUTPATIENT
Start: 2019-01-01

## 2019-01-01 RX ORDER — LOSARTAN POTASSIUM 25 MG/1
25 TABLET ORAL NIGHTLY
Qty: 30 TABLET | Refills: 3 | Status: SHIPPED | OUTPATIENT
Start: 2019-01-01 | End: 2019-01-01

## 2019-01-01 RX ORDER — CALCIUM GLUCONATE 94 MG/ML
1 INJECTION, SOLUTION INTRAVENOUS ONCE
Status: DISCONTINUED | OUTPATIENT
Start: 2019-01-01 | End: 2019-01-01

## 2019-01-01 RX ORDER — ACETAMINOPHEN 325 MG/1
650 TABLET ORAL EVERY 4 HOURS PRN
Status: DISCONTINUED | OUTPATIENT
Start: 2019-01-01 | End: 2019-01-01

## 2019-01-01 RX ORDER — ASCORBIC ACID 500 MG
500 TABLET ORAL DAILY
Status: DISCONTINUED | OUTPATIENT
Start: 2019-01-01 | End: 2019-01-01 | Stop reason: HOSPADM

## 2019-01-01 RX ORDER — TORSEMIDE 10 MG/1
20 TABLET ORAL DAILY
Status: DISCONTINUED | OUTPATIENT
Start: 2019-01-01 | End: 2019-01-01

## 2019-01-01 RX ORDER — LORAZEPAM 2 MG/ML
0.5 INJECTION INTRAMUSCULAR EVERY 4 HOURS PRN
Status: DISCONTINUED | OUTPATIENT
Start: 2019-01-01 | End: 2019-01-01

## 2019-01-01 RX ORDER — ONDANSETRON 2 MG/ML
4 INJECTION INTRAMUSCULAR; INTRAVENOUS EVERY 6 HOURS PRN
Status: CANCELLED | OUTPATIENT
Start: 2019-01-01

## 2019-01-01 RX ORDER — GUAIFENESIN 600 MG/1
600 TABLET, EXTENDED RELEASE ORAL 2 TIMES DAILY
Status: CANCELLED | OUTPATIENT
Start: 2019-01-01

## 2019-01-01 RX ORDER — CARVEDILOL 12.5 MG/1
12.5 TABLET ORAL 2 TIMES DAILY
Qty: 60 TABLET | Refills: 3 | Status: SHIPPED | OUTPATIENT
Start: 2019-01-01 | End: 2019-01-01 | Stop reason: SDUPTHER

## 2019-01-01 RX ORDER — HYDRALAZINE HYDROCHLORIDE 50 MG/1
100 TABLET, FILM COATED ORAL 2 TIMES DAILY
Status: DISCONTINUED | OUTPATIENT
Start: 2019-01-01 | End: 2019-01-01

## 2019-01-01 RX ORDER — CLOPIDOGREL BISULFATE 75 MG/1
75 TABLET ORAL DAILY
Status: DISCONTINUED | OUTPATIENT
Start: 2019-01-01 | End: 2019-01-01 | Stop reason: HOSPADM

## 2019-01-01 RX ORDER — FUROSEMIDE 10 MG/ML
20 INJECTION INTRAMUSCULAR; INTRAVENOUS 2 TIMES DAILY
Status: DISCONTINUED | OUTPATIENT
Start: 2019-01-01 | End: 2019-01-01

## 2019-01-01 RX ORDER — FUROSEMIDE 20 MG/1
20 TABLET ORAL DAILY
Status: DISCONTINUED | OUTPATIENT
Start: 2019-01-01 | End: 2019-01-01 | Stop reason: HOSPADM

## 2019-01-01 RX ORDER — DULOXETIN HYDROCHLORIDE 20 MG/1
20 CAPSULE, DELAYED RELEASE ORAL DAILY
Qty: 30 CAPSULE | Refills: 11 | Status: SHIPPED | OUTPATIENT
Start: 2019-01-01

## 2019-01-01 RX ORDER — FERROUS SULFATE 325(65) MG
325 TABLET ORAL 2 TIMES DAILY
Status: CANCELLED | OUTPATIENT
Start: 2019-01-01

## 2019-01-01 RX ORDER — MINERAL OIL AND WHITE PETROLATUM 150; 830 MG/G; MG/G
OINTMENT OPHTHALMIC
Status: DISCONTINUED | OUTPATIENT
Start: 2019-01-01 | End: 2019-01-01 | Stop reason: HOSPADM

## 2019-01-01 RX ORDER — SODIUM BICARBONATE 650 MG/1
650 TABLET ORAL DAILY
Qty: 30 TABLET | Refills: 11 | Status: SHIPPED | OUTPATIENT
Start: 2019-01-01

## 2019-01-01 RX ORDER — SODIUM BICARBONATE 650 MG/1
650 TABLET ORAL 2 TIMES DAILY
Status: DISCONTINUED | OUTPATIENT
Start: 2019-01-01 | End: 2019-01-01 | Stop reason: HOSPADM

## 2019-01-01 RX ORDER — BUMETANIDE 1 MG/1
1 TABLET ORAL 2 TIMES DAILY
Status: DISCONTINUED | OUTPATIENT
Start: 2019-01-01 | End: 2019-01-01

## 2019-01-01 RX ORDER — ALBUTEROL SULFATE 2.5 MG/3ML
2.5 SOLUTION RESPIRATORY (INHALATION)
Status: DISCONTINUED | OUTPATIENT
Start: 2019-01-01 | End: 2019-01-01

## 2019-01-01 RX ORDER — GLYCOPYRROLATE 0.2 MG/ML
0.1 INJECTION INTRAMUSCULAR; INTRAVENOUS ONCE
Status: DISCONTINUED | OUTPATIENT
Start: 2019-01-01 | End: 2019-01-01 | Stop reason: SDUPTHER

## 2019-01-01 RX ORDER — HYDRALAZINE HYDROCHLORIDE 50 MG/1
50 TABLET, FILM COATED ORAL 3 TIMES DAILY
Qty: 1 TABLET | Refills: 0 | COMMUNITY
Start: 2019-01-01 | End: 2019-01-01 | Stop reason: SDUPTHER

## 2019-01-01 RX ORDER — ISOSORBIDE MONONITRATE 30 MG/1
60 TABLET, EXTENDED RELEASE ORAL 2 TIMES DAILY
Status: DISCONTINUED | OUTPATIENT
Start: 2019-01-01 | End: 2019-01-01

## 2019-01-01 RX ORDER — FUROSEMIDE 10 MG/ML
80 INJECTION INTRAMUSCULAR; INTRAVENOUS ONCE
Status: COMPLETED | OUTPATIENT
Start: 2019-01-01 | End: 2019-01-01

## 2019-01-01 RX ORDER — MORPHINE SULFATE 4 MG/ML
2 INJECTION, SOLUTION INTRAMUSCULAR; INTRAVENOUS
Status: DISCONTINUED | OUTPATIENT
Start: 2019-01-01 | End: 2019-01-01

## 2019-01-01 RX ORDER — MORPHINE SULFATE 2 MG/ML
1 INJECTION, SOLUTION INTRAMUSCULAR; INTRAVENOUS
Status: DISCONTINUED | OUTPATIENT
Start: 2019-01-01 | End: 2019-01-01

## 2019-01-01 RX ORDER — DULOXETIN HYDROCHLORIDE 20 MG/1
20 CAPSULE, DELAYED RELEASE ORAL DAILY
COMMUNITY
End: 2019-01-01 | Stop reason: SDUPTHER

## 2019-01-01 RX ORDER — HYDRALAZINE HYDROCHLORIDE 25 MG/1
25 TABLET, FILM COATED ORAL
Qty: 450 TABLET | Refills: 3 | Status: CANCELLED | OUTPATIENT
Start: 2019-01-01

## 2019-01-01 RX ORDER — FAMOTIDINE 20 MG/1
20 TABLET, FILM COATED ORAL DAILY
Status: CANCELLED | OUTPATIENT
Start: 2019-01-01

## 2019-01-01 RX ORDER — ONDANSETRON 4 MG/1
4 TABLET, ORALLY DISINTEGRATING ORAL EVERY 6 HOURS PRN
Status: DISCONTINUED | OUTPATIENT
Start: 2019-01-01 | End: 2019-01-01 | Stop reason: HOSPADM

## 2019-01-01 RX ORDER — HYDROCODONE BITARTRATE AND ACETAMINOPHEN 5; 325 MG/1; MG/1
2 TABLET ORAL EVERY 4 HOURS PRN
Status: DISCONTINUED | OUTPATIENT
Start: 2019-01-01 | End: 2019-01-01 | Stop reason: HOSPADM

## 2019-01-01 RX ORDER — LOSARTAN POTASSIUM 100 MG/1
100 TABLET ORAL DAILY
Status: DISCONTINUED | OUTPATIENT
Start: 2019-01-01 | End: 2019-01-01 | Stop reason: HOSPADM

## 2019-01-01 RX ORDER — CARVEDILOL 12.5 MG/1
12.5 TABLET ORAL 2 TIMES DAILY
Status: DISCONTINUED | OUTPATIENT
Start: 2019-01-01 | End: 2019-01-01 | Stop reason: HOSPADM

## 2019-01-01 RX ORDER — LANOLIN ALCOHOL/MO/W.PET/CERES
325 CREAM (GRAM) TOPICAL 2 TIMES DAILY
Status: DISCONTINUED | OUTPATIENT
Start: 2019-01-01 | End: 2019-01-01

## 2019-01-01 RX ORDER — SODIUM CHLORIDE 0.9 % (FLUSH) 0.9 %
10 SYRINGE (ML) INJECTION PRN
Status: CANCELLED | OUTPATIENT
Start: 2019-01-01

## 2019-01-01 RX ORDER — PANTOPRAZOLE SODIUM 40 MG/1
40 TABLET, DELAYED RELEASE ORAL
Status: DISCONTINUED | OUTPATIENT
Start: 2019-01-01 | End: 2019-01-01 | Stop reason: HOSPADM

## 2019-01-01 RX ORDER — LORAZEPAM 2 MG/ML
2 INJECTION INTRAMUSCULAR ONCE
Status: COMPLETED | OUTPATIENT
Start: 2019-01-01 | End: 2019-01-01

## 2019-01-01 RX ORDER — HYDRALAZINE HYDROCHLORIDE 50 MG/1
50 TABLET, FILM COATED ORAL 3 TIMES DAILY
Qty: 90 TABLET | Refills: 3 | OUTPATIENT
Start: 2019-01-01

## 2019-01-01 RX ADMIN — CLOPIDOGREL BISULFATE 75 MG: 75 TABLET ORAL at 08:46

## 2019-01-01 RX ADMIN — SPIRONOLACTONE 25 MG: 25 TABLET, FILM COATED ORAL at 09:06

## 2019-01-01 RX ADMIN — INSULIN LISPRO 1 UNITS: 100 INJECTION, SOLUTION INTRAVENOUS; SUBCUTANEOUS at 20:47

## 2019-01-01 RX ADMIN — INSULIN LISPRO 2 UNITS: 100 INJECTION, SOLUTION INTRAVENOUS; SUBCUTANEOUS at 09:31

## 2019-01-01 RX ADMIN — SODIUM BICARBONATE 650 MG TABLET 650 MG: at 21:56

## 2019-01-01 RX ADMIN — NIFEDIPINE 60 MG: 30 TABLET, FILM COATED, EXTENDED RELEASE ORAL at 21:15

## 2019-01-01 RX ADMIN — FERROUS SULFATE TAB EC 325 MG (65 MG FE EQUIVALENT) 325 MG: 325 (65 FE) TABLET DELAYED RESPONSE at 16:50

## 2019-01-01 RX ADMIN — NIFEDIPINE 60 MG: 30 TABLET, FILM COATED, EXTENDED RELEASE ORAL at 08:48

## 2019-01-01 RX ADMIN — MINERAL OIL AND WHITE PETROLATUM: 150; 830 OINTMENT OPHTHALMIC at 01:18

## 2019-01-01 RX ADMIN — FERROUS SULFATE TAB 325 MG (65 MG ELEMENTAL FE) 325 MG: 325 (65 FE) TAB at 09:07

## 2019-01-01 RX ADMIN — NIFEDIPINE 60 MG: 30 TABLET, FILM COATED, EXTENDED RELEASE ORAL at 09:29

## 2019-01-01 RX ADMIN — ISOSORBIDE MONONITRATE 60 MG: 30 TABLET, EXTENDED RELEASE ORAL at 16:42

## 2019-01-01 RX ADMIN — HYDRALAZINE HYDROCHLORIDE 100 MG: 50 TABLET, FILM COATED ORAL at 22:14

## 2019-01-01 RX ADMIN — VANCOMYCIN HYDROCHLORIDE 1500 MG: 1 INJECTION, POWDER, LYOPHILIZED, FOR SOLUTION INTRAVENOUS at 14:46

## 2019-01-01 RX ADMIN — Medication 10 ML: at 12:20

## 2019-01-01 RX ADMIN — Medication 50 UNITS: at 08:47

## 2019-01-01 RX ADMIN — BUMETANIDE 2 MG: 1 TABLET ORAL at 08:45

## 2019-01-01 RX ADMIN — LOSARTAN POTASSIUM 100 MG: 50 TABLET ORAL at 08:46

## 2019-01-01 RX ADMIN — Medication 1 MG: at 10:57

## 2019-01-01 RX ADMIN — IPRATROPIUM BROMIDE AND ALBUTEROL SULFATE 1 AMPULE: .5; 3 SOLUTION RESPIRATORY (INHALATION) at 09:33

## 2019-01-01 RX ADMIN — SODIUM BICARBONATE 650 MG TABLET 650 MG: at 08:23

## 2019-01-01 RX ADMIN — FUROSEMIDE 20 MG: 20 TABLET ORAL at 08:46

## 2019-01-01 RX ADMIN — ASPIRIN 325 MG: 325 TABLET, DELAYED RELEASE ORAL at 09:39

## 2019-01-01 RX ADMIN — PANTOPRAZOLE SODIUM 40 MG: 40 TABLET, DELAYED RELEASE ORAL at 05:42

## 2019-01-01 RX ADMIN — SIMVASTATIN 40 MG: 20 TABLET, FILM COATED ORAL at 20:44

## 2019-01-01 RX ADMIN — ATROPINE SULFATE 2 DROP: 10 SOLUTION OPHTHALMIC at 20:20

## 2019-01-01 RX ADMIN — HYDRALAZINE HYDROCHLORIDE 100 MG: 50 TABLET, FILM COATED ORAL at 22:11

## 2019-01-01 RX ADMIN — ACETYLCYSTEINE 200 MG: 200 SOLUTION ORAL; RESPIRATORY (INHALATION) at 09:11

## 2019-01-01 RX ADMIN — Medication 10 ML: at 20:29

## 2019-01-01 RX ADMIN — FERROUS SULFATE TAB 325 MG (65 MG ELEMENTAL FE) 325 MG: 325 (65 FE) TAB at 09:04

## 2019-01-01 RX ADMIN — ISOSORBIDE MONONITRATE 60 MG: 30 TABLET, EXTENDED RELEASE ORAL at 08:42

## 2019-01-01 RX ADMIN — INSULIN LISPRO 1 UNITS: 100 INJECTION, SOLUTION INTRAVENOUS; SUBCUTANEOUS at 21:15

## 2019-01-01 RX ADMIN — MORPHINE SULFATE 2 MG: 2 INJECTION, SOLUTION INTRAMUSCULAR; INTRAVENOUS at 08:19

## 2019-01-01 RX ADMIN — OXYCODONE HYDROCHLORIDE AND ACETAMINOPHEN 500 MG: 500 TABLET ORAL at 09:07

## 2019-01-01 RX ADMIN — SODIUM BICARBONATE 650 MG TABLET 650 MG: at 21:21

## 2019-01-01 RX ADMIN — Medication 10 ML: at 12:33

## 2019-01-01 RX ADMIN — Medication 10 UNITS: at 08:39

## 2019-01-01 RX ADMIN — ATROPINE SULFATE 2 DROP: 10 SOLUTION/ DROPS OPHTHALMIC at 20:59

## 2019-01-01 RX ADMIN — FLUTICASONE PROPIONATE 1 SPRAY: 50 SPRAY, METERED NASAL at 08:44

## 2019-01-01 RX ADMIN — LORAZEPAM 1 MG: 2 INJECTION INTRAMUSCULAR; INTRAVENOUS at 05:30

## 2019-01-01 RX ADMIN — HEPARIN SODIUM 5000 UNITS: 5000 INJECTION INTRAVENOUS; SUBCUTANEOUS at 20:48

## 2019-01-01 RX ADMIN — MORPHINE SULFATE 1 MG: 2 INJECTION, SOLUTION INTRAMUSCULAR; INTRAVENOUS at 11:36

## 2019-01-01 RX ADMIN — MULTIPLE VITAMINS W/ MINERALS TAB 1 TABLET: TAB at 09:31

## 2019-01-01 RX ADMIN — SODIUM BICARBONATE 650 MG TABLET 650 MG: at 08:34

## 2019-01-01 RX ADMIN — INSULIN LISPRO 1 UNITS: 100 INJECTION, SOLUTION INTRAVENOUS; SUBCUTANEOUS at 16:53

## 2019-01-01 RX ADMIN — NIFEDIPINE 60 MG: 30 TABLET, FILM COATED, EXTENDED RELEASE ORAL at 21:08

## 2019-01-01 RX ADMIN — GEMFIBROZIL 600 MG: 600 TABLET ORAL at 09:38

## 2019-01-01 RX ADMIN — IRON SUCROSE 200 MG: 20 INJECTION, SOLUTION INTRAVENOUS at 20:04

## 2019-01-01 RX ADMIN — INSULIN LISPRO 2 UNITS: 100 INJECTION, SOLUTION INTRAVENOUS; SUBCUTANEOUS at 08:04

## 2019-01-01 RX ADMIN — FERROUS SULFATE TAB 325 MG (65 MG ELEMENTAL FE) 325 MG: 325 (65 FE) TAB at 20:45

## 2019-01-01 RX ADMIN — FLUTICASONE PROPIONATE 1 SPRAY: 50 SPRAY, METERED NASAL at 06:48

## 2019-01-01 RX ADMIN — ISOSORBIDE MONONITRATE 60 MG: 30 TABLET, EXTENDED RELEASE ORAL at 17:05

## 2019-01-01 RX ADMIN — SODIUM BICARBONATE 650 MG TABLET 650 MG: at 20:44

## 2019-01-01 RX ADMIN — MORPHINE SULFATE 2 MG: 2 INJECTION, SOLUTION INTRAMUSCULAR; INTRAVENOUS at 12:20

## 2019-01-01 RX ADMIN — CARVEDILOL 12.5 MG: 12.5 TABLET, FILM COATED ORAL at 07:58

## 2019-01-01 RX ADMIN — FERROUS SULFATE TAB EC 325 MG (65 MG FE EQUIVALENT) 325 MG: 325 (65 FE) TABLET DELAYED RESPONSE at 09:39

## 2019-01-01 RX ADMIN — ATROPINE SULFATE 2 DROP: 10 SOLUTION OPHTHALMIC at 19:29

## 2019-01-01 RX ADMIN — Medication 10 ML: at 13:43

## 2019-01-01 RX ADMIN — SODIUM BICARBONATE 650 MG TABLET 650 MG: at 09:25

## 2019-01-01 RX ADMIN — HEPARIN SODIUM 5000 UNITS: 5000 INJECTION INTRAVENOUS; SUBCUTANEOUS at 21:21

## 2019-01-01 RX ADMIN — LORAZEPAM 0.5 MG: 2 INJECTION INTRAMUSCULAR; INTRAVENOUS at 04:53

## 2019-01-01 RX ADMIN — Medication 10 ML: at 08:24

## 2019-01-01 RX ADMIN — FERROUS SULFATE TAB 325 MG (65 MG ELEMENTAL FE) 325 MG: 325 (65 FE) TAB at 21:15

## 2019-01-01 RX ADMIN — HEPARIN SODIUM 5000 UNITS: 5000 INJECTION INTRAVENOUS; SUBCUTANEOUS at 08:24

## 2019-01-01 RX ADMIN — LORAZEPAM 1 MG: 2 INJECTION INTRAMUSCULAR; INTRAVENOUS at 00:23

## 2019-01-01 RX ADMIN — IPRATROPIUM BROMIDE AND ALBUTEROL SULFATE 1 AMPULE: .5; 3 SOLUTION RESPIRATORY (INHALATION) at 09:05

## 2019-01-01 RX ADMIN — ISOSORBIDE MONONITRATE 60 MG: 30 TABLET, EXTENDED RELEASE ORAL at 17:15

## 2019-01-01 RX ADMIN — TORSEMIDE 20 MG: 10 TABLET ORAL at 08:34

## 2019-01-01 RX ADMIN — SPIRONOLACTONE 25 MG: 25 TABLET, FILM COATED ORAL at 07:58

## 2019-01-01 RX ADMIN — Medication 10 ML: at 17:54

## 2019-01-01 RX ADMIN — CARVEDILOL 12.5 MG: 12.5 TABLET, FILM COATED ORAL at 09:39

## 2019-01-01 RX ADMIN — Medication 10 ML: at 08:28

## 2019-01-01 RX ADMIN — FERROUS SULFATE TAB 325 MG (65 MG ELEMENTAL FE) 325 MG: 325 (65 FE) TAB at 21:21

## 2019-01-01 RX ADMIN — SPIRONOLACTONE 25 MG: 25 TABLET, FILM COATED ORAL at 09:39

## 2019-01-01 RX ADMIN — HEPARIN SODIUM 5000 UNITS: 5000 INJECTION INTRAVENOUS; SUBCUTANEOUS at 20:28

## 2019-01-01 RX ADMIN — SIMVASTATIN 40 MG: 40 TABLET, FILM COATED ORAL at 20:13

## 2019-01-01 RX ADMIN — ATROPINE SULFATE 2 DROP: 10 SOLUTION OPHTHALMIC at 15:27

## 2019-01-01 RX ADMIN — Medication 0.1 MG: at 09:03

## 2019-01-01 RX ADMIN — SODIUM BICARBONATE 650 MG TABLET 650 MG: at 09:05

## 2019-01-01 RX ADMIN — ASPIRIN 325 MG: 325 TABLET, DELAYED RELEASE ORAL at 08:46

## 2019-01-01 RX ADMIN — FERROUS SULFATE TAB 325 MG (65 MG ELEMENTAL FE) 325 MG: 325 (65 FE) TAB at 20:28

## 2019-01-01 RX ADMIN — Medication 1 MG: at 07:51

## 2019-01-01 RX ADMIN — HEPARIN SODIUM 5000 UNITS: 5000 INJECTION INTRAVENOUS; SUBCUTANEOUS at 08:42

## 2019-01-01 RX ADMIN — INSULIN LISPRO 3 UNITS: 100 INJECTION, SOLUTION INTRAVENOUS; SUBCUTANEOUS at 12:53

## 2019-01-01 RX ADMIN — BUMETANIDE 2 MG: 1 TABLET ORAL at 08:17

## 2019-01-01 RX ADMIN — BUMETANIDE 2 MG: 0.25 INJECTION INTRAMUSCULAR; INTRAVENOUS at 08:38

## 2019-01-01 RX ADMIN — DULOXETINE HYDROCHLORIDE 20 MG: 20 CAPSULE, DELAYED RELEASE ORAL at 09:25

## 2019-01-01 RX ADMIN — HYDRALAZINE HYDROCHLORIDE 100 MG: 25 TABLET, FILM COATED ORAL at 06:36

## 2019-01-01 RX ADMIN — TRAMADOL HYDROCHLORIDE 50 MG: 50 TABLET, FILM COATED ORAL at 01:41

## 2019-01-01 RX ADMIN — METHYLPREDNISOLONE SODIUM SUCCINATE 40 MG: 40 INJECTION, POWDER, FOR SOLUTION INTRAMUSCULAR; INTRAVENOUS at 08:38

## 2019-01-01 RX ADMIN — FERROUS SULFATE TAB EC 325 MG (65 MG FE EQUIVALENT) 325 MG: 325 (65 FE) TABLET DELAYED RESPONSE at 12:07

## 2019-01-01 RX ADMIN — MORPHINE SULFATE 10 MG: 100 SOLUTION ORAL at 11:04

## 2019-01-01 RX ADMIN — MORPHINE SULFATE 2 MG: 2 INJECTION, SOLUTION INTRAMUSCULAR; INTRAVENOUS at 01:48

## 2019-01-01 RX ADMIN — ASPIRIN 325 MG: 325 TABLET, DELAYED RELEASE ORAL at 17:55

## 2019-01-01 RX ADMIN — HYDRALAZINE HYDROCHLORIDE 100 MG: 25 TABLET, FILM COATED ORAL at 13:51

## 2019-01-01 RX ADMIN — SIMVASTATIN 40 MG: 20 TABLET, FILM COATED ORAL at 21:21

## 2019-01-01 RX ADMIN — MULTIPLE VITAMINS W/ MINERALS TAB 1 TABLET: TAB at 09:43

## 2019-01-01 RX ADMIN — LORAZEPAM 1 MG: 2 INJECTION INTRAMUSCULAR; INTRAVENOUS at 02:38

## 2019-01-01 RX ADMIN — METHYLPREDNISOLONE SODIUM SUCCINATE 40 MG: 40 INJECTION, POWDER, FOR SOLUTION INTRAMUSCULAR; INTRAVENOUS at 09:38

## 2019-01-01 RX ADMIN — ASPIRIN 81 MG: 81 TABLET, COATED ORAL at 08:18

## 2019-01-01 RX ADMIN — NIFEDIPINE 60 MG: 30 TABLET, FILM COATED, EXTENDED RELEASE ORAL at 01:12

## 2019-01-01 RX ADMIN — ASPIRIN 81 MG: 81 TABLET, COATED ORAL at 08:44

## 2019-01-01 RX ADMIN — IPRATROPIUM BROMIDE AND ALBUTEROL SULFATE 1 AMPULE: .5; 3 SOLUTION RESPIRATORY (INHALATION) at 13:14

## 2019-01-01 RX ADMIN — MORPHINE SULFATE 10 MG: 100 SOLUTION ORAL at 05:33

## 2019-01-01 RX ADMIN — DULOXETINE HYDROCHLORIDE 20 MG: 20 CAPSULE, DELAYED RELEASE ORAL at 07:57

## 2019-01-01 RX ADMIN — METHYLPREDNISOLONE SODIUM SUCCINATE 40 MG: 40 INJECTION, POWDER, FOR SOLUTION INTRAMUSCULAR; INTRAVENOUS at 08:16

## 2019-01-01 RX ADMIN — MORPHINE SULFATE 2 MG: 2 INJECTION, SOLUTION INTRAMUSCULAR; INTRAVENOUS at 23:31

## 2019-01-01 RX ADMIN — GEMFIBROZIL 600 MG: 600 TABLET ORAL at 16:42

## 2019-01-01 RX ADMIN — ATROPINE SULFATE 2 DROP: 10 SOLUTION OPHTHALMIC at 20:41

## 2019-01-01 RX ADMIN — CARVEDILOL 12.5 MG: 12.5 TABLET, FILM COATED ORAL at 21:21

## 2019-01-01 RX ADMIN — INSULIN LISPRO 3 UNITS: 100 INJECTION, SOLUTION INTRAVENOUS; SUBCUTANEOUS at 11:45

## 2019-01-01 RX ADMIN — DULOXETINE HYDROCHLORIDE 20 MG: 20 CAPSULE, DELAYED RELEASE ORAL at 08:37

## 2019-01-01 RX ADMIN — MINERAL OIL AND WHITE PETROLATUM: 150; 830 OINTMENT OPHTHALMIC at 00:52

## 2019-01-01 RX ADMIN — CARVEDILOL 12.5 MG: 12.5 TABLET, FILM COATED ORAL at 09:37

## 2019-01-01 RX ADMIN — HEPARIN SODIUM 5000 UNITS: 5000 INJECTION INTRAVENOUS; SUBCUTANEOUS at 08:55

## 2019-01-01 RX ADMIN — GUAIFENESIN 600 MG: 600 TABLET, EXTENDED RELEASE ORAL at 08:19

## 2019-01-01 RX ADMIN — DOBUTAMINE HYDROCHLORIDE 2.5 MCG/KG/MIN: 400 INJECTION INTRAVENOUS at 07:53

## 2019-01-01 RX ADMIN — Medication 10 ML: at 08:49

## 2019-01-01 RX ADMIN — INSULIN LISPRO 6 UNITS: 100 INJECTION, SOLUTION INTRAVENOUS; SUBCUTANEOUS at 16:35

## 2019-01-01 RX ADMIN — MORPHINE SULFATE 10 MG: 100 SOLUTION ORAL at 21:05

## 2019-01-01 RX ADMIN — SIMVASTATIN 40 MG: 40 TABLET, FILM COATED ORAL at 21:08

## 2019-01-01 RX ADMIN — SPIRONOLACTONE 25 MG: 25 TABLET, FILM COATED ORAL at 08:42

## 2019-01-01 RX ADMIN — HYDRALAZINE HYDROCHLORIDE 100 MG: 25 TABLET, FILM COATED ORAL at 21:23

## 2019-01-01 RX ADMIN — ATROPINE SULFATE 2 DROP: 10 SOLUTION OPHTHALMIC at 00:47

## 2019-01-01 RX ADMIN — MORPHINE SULFATE 10 MG: 100 SOLUTION ORAL at 13:33

## 2019-01-01 RX ADMIN — SODIUM BICARBONATE 1300 MG: 650 TABLET ORAL at 21:08

## 2019-01-01 RX ADMIN — SPIRONOLACTONE 25 MG: 25 TABLET, FILM COATED ORAL at 08:44

## 2019-01-01 RX ADMIN — FLUTICASONE PROPIONATE 1 SPRAY: 50 SPRAY, METERED NASAL at 09:34

## 2019-01-01 RX ADMIN — FUROSEMIDE 40 MG: 10 INJECTION, SOLUTION INTRAMUSCULAR; INTRAVENOUS at 20:13

## 2019-01-01 RX ADMIN — LEVOFLOXACIN 750 MG: 5 INJECTION, SOLUTION INTRAVENOUS at 06:36

## 2019-01-01 RX ADMIN — Medication 10 ML: at 09:03

## 2019-01-01 RX ADMIN — IPRATROPIUM BROMIDE AND ALBUTEROL SULFATE 1 AMPULE: .5; 3 SOLUTION RESPIRATORY (INHALATION) at 20:44

## 2019-01-01 RX ADMIN — MORPHINE SULFATE 1 MG: 2 INJECTION, SOLUTION INTRAMUSCULAR; INTRAVENOUS at 05:32

## 2019-01-01 RX ADMIN — HYDRALAZINE HYDROCHLORIDE 100 MG: 25 TABLET, FILM COATED ORAL at 21:27

## 2019-01-01 RX ADMIN — Medication 10 ML: at 09:27

## 2019-01-01 RX ADMIN — METHYLPREDNISOLONE SODIUM SUCCINATE 40 MG: 40 INJECTION, POWDER, FOR SOLUTION INTRAMUSCULAR; INTRAVENOUS at 16:33

## 2019-01-01 RX ADMIN — BUMETANIDE 1 MG: 1 TABLET ORAL at 01:12

## 2019-01-01 RX ADMIN — TRAMADOL HYDROCHLORIDE 50 MG: 50 TABLET, FILM COATED ORAL at 11:11

## 2019-01-01 RX ADMIN — GUAIFENESIN 600 MG: 600 TABLET, EXTENDED RELEASE ORAL at 08:37

## 2019-01-01 RX ADMIN — CARVEDILOL 25 MG: 12.5 TABLET, FILM COATED ORAL at 17:15

## 2019-01-01 RX ADMIN — INSULIN LISPRO 1 UNITS: 100 INJECTION, SOLUTION INTRAVENOUS; SUBCUTANEOUS at 12:04

## 2019-01-01 RX ADMIN — BUMETANIDE 2 MG: 1 TABLET ORAL at 08:44

## 2019-01-01 RX ADMIN — HEPARIN SODIUM 5000 UNITS: 5000 INJECTION INTRAVENOUS; SUBCUTANEOUS at 20:47

## 2019-01-01 RX ADMIN — OXYCODONE HYDROCHLORIDE AND ACETAMINOPHEN 500 MG: 500 TABLET ORAL at 08:17

## 2019-01-01 RX ADMIN — NIFEDIPINE 60 MG: 30 TABLET, FILM COATED, EXTENDED RELEASE ORAL at 09:38

## 2019-01-01 RX ADMIN — INSULIN LISPRO 3 UNITS: 100 INJECTION, SOLUTION INTRAVENOUS; SUBCUTANEOUS at 17:05

## 2019-01-01 RX ADMIN — NIFEDIPINE 60 MG: 30 TABLET, FILM COATED, EXTENDED RELEASE ORAL at 08:37

## 2019-01-01 RX ADMIN — Medication 10 ML: at 22:54

## 2019-01-01 RX ADMIN — INSULIN LISPRO 1 UNITS: 100 INJECTION, SOLUTION INTRAVENOUS; SUBCUTANEOUS at 07:56

## 2019-01-01 RX ADMIN — MORPHINE SULFATE 10 MG: 100 SOLUTION ORAL at 23:44

## 2019-01-01 RX ADMIN — Medication 10 ML: at 21:21

## 2019-01-01 RX ADMIN — FERROUS SULFATE TAB EC 325 MG (65 MG FE EQUIVALENT) 325 MG: 325 (65 FE) TABLET DELAYED RESPONSE at 22:15

## 2019-01-01 RX ADMIN — FAMOTIDINE 20 MG: 20 TABLET ORAL at 08:23

## 2019-01-01 RX ADMIN — SIMVASTATIN 40 MG: 20 TABLET, FILM COATED ORAL at 21:15

## 2019-01-01 RX ADMIN — ATROPINE SULFATE 2 DROP: 10 SOLUTION/ DROPS OPHTHALMIC at 15:16

## 2019-01-01 RX ADMIN — FERROUS SULFATE TAB EC 325 MG (65 MG FE EQUIVALENT) 325 MG: 325 (65 FE) TABLET DELAYED RESPONSE at 20:12

## 2019-01-01 RX ADMIN — MORPHINE SULFATE 10 MG: 100 SOLUTION ORAL at 23:09

## 2019-01-01 RX ADMIN — SPIRONOLACTONE 25 MG: 25 TABLET, FILM COATED ORAL at 08:18

## 2019-01-01 RX ADMIN — HEPARIN SODIUM 5000 UNITS: 5000 INJECTION INTRAVENOUS; SUBCUTANEOUS at 20:31

## 2019-01-01 RX ADMIN — IPRATROPIUM BROMIDE AND ALBUTEROL SULFATE 1 AMPULE: .5; 3 SOLUTION RESPIRATORY (INHALATION) at 21:02

## 2019-01-01 RX ADMIN — ISOSORBIDE MONONITRATE 60 MG: 30 TABLET, EXTENDED RELEASE ORAL at 09:22

## 2019-01-01 RX ADMIN — INSULIN LISPRO 1 UNITS: 100 INJECTION, SOLUTION INTRAVENOUS; SUBCUTANEOUS at 17:54

## 2019-01-01 RX ADMIN — ISOSORBIDE MONONITRATE 60 MG: 30 TABLET, EXTENDED RELEASE ORAL at 16:51

## 2019-01-01 RX ADMIN — FERROUS SULFATE TAB 325 MG (65 MG ELEMENTAL FE) 325 MG: 325 (65 FE) TAB at 08:44

## 2019-01-01 RX ADMIN — IPRATROPIUM BROMIDE AND ALBUTEROL SULFATE 1 AMPULE: .5; 3 SOLUTION RESPIRATORY (INHALATION) at 13:18

## 2019-01-01 RX ADMIN — HEPARIN SODIUM 5000 UNITS: 5000 INJECTION INTRAVENOUS; SUBCUTANEOUS at 21:56

## 2019-01-01 RX ADMIN — MORPHINE SULFATE 2 MG: 2 INJECTION, SOLUTION INTRAMUSCULAR; INTRAVENOUS at 04:26

## 2019-01-01 RX ADMIN — IPRATROPIUM BROMIDE AND ALBUTEROL SULFATE 1 AMPULE: .5; 3 SOLUTION RESPIRATORY (INHALATION) at 12:49

## 2019-01-01 RX ADMIN — CARVEDILOL 12.5 MG: 12.5 TABLET, FILM COATED ORAL at 17:05

## 2019-01-01 RX ADMIN — FAMOTIDINE 20 MG: 20 TABLET ORAL at 09:04

## 2019-01-01 RX ADMIN — IPRATROPIUM BROMIDE AND ALBUTEROL SULFATE 1 AMPULE: .5; 3 SOLUTION RESPIRATORY (INHALATION) at 16:38

## 2019-01-01 RX ADMIN — TRAMADOL HYDROCHLORIDE 50 MG: 50 TABLET, FILM COATED ORAL at 11:59

## 2019-01-01 RX ADMIN — GUAIFENESIN 600 MG: 600 TABLET, EXTENDED RELEASE ORAL at 09:25

## 2019-01-01 RX ADMIN — FUROSEMIDE 40 MG: 10 INJECTION, SOLUTION INTRAMUSCULAR; INTRAVENOUS at 12:53

## 2019-01-01 RX ADMIN — ATROPINE SULFATE 2 DROP: 10 SOLUTION OPHTHALMIC at 04:10

## 2019-01-01 RX ADMIN — CLOPIDOGREL BISULFATE 75 MG: 75 TABLET ORAL at 19:33

## 2019-01-01 RX ADMIN — ISOSORBIDE MONONITRATE 60 MG: 60 TABLET ORAL at 20:12

## 2019-01-01 RX ADMIN — TRAMADOL HYDROCHLORIDE 50 MG: 50 TABLET, FILM COATED ORAL at 04:43

## 2019-01-01 RX ADMIN — FERROUS SULFATE TAB 325 MG (65 MG ELEMENTAL FE) 325 MG: 325 (65 FE) TAB at 09:25

## 2019-01-01 RX ADMIN — DEXTROSE 15 G: 15 GEL ORAL at 11:47

## 2019-01-01 RX ADMIN — Medication 10 ML: at 11:36

## 2019-01-01 RX ADMIN — FERROUS SULFATE TAB 325 MG (65 MG ELEMENTAL FE) 325 MG: 325 (65 FE) TAB at 23:03

## 2019-01-01 RX ADMIN — DULOXETINE HYDROCHLORIDE 20 MG: 20 CAPSULE, DELAYED RELEASE ORAL at 07:45

## 2019-01-01 RX ADMIN — SIMVASTATIN 40 MG: 20 TABLET, FILM COATED ORAL at 17:05

## 2019-01-01 RX ADMIN — ACETAMINOPHEN 650 MG: 325 TABLET, FILM COATED ORAL at 07:57

## 2019-01-01 RX ADMIN — TRAMADOL HYDROCHLORIDE 50 MG: 50 TABLET, FILM COATED ORAL at 22:10

## 2019-01-01 RX ADMIN — Medication 20 MILLICURIE: at 12:08

## 2019-01-01 RX ADMIN — DULOXETINE HYDROCHLORIDE 20 MG: 20 CAPSULE, DELAYED RELEASE ORAL at 09:08

## 2019-01-01 RX ADMIN — CARVEDILOL 25 MG: 12.5 TABLET, FILM COATED ORAL at 08:17

## 2019-01-01 RX ADMIN — Medication 10 ML: at 10:20

## 2019-01-01 RX ADMIN — ONDANSETRON 4 MG: 2 INJECTION, SOLUTION INTRAMUSCULAR; INTRAVENOUS at 17:21

## 2019-01-01 RX ADMIN — GEMFIBROZIL 600 MG: 600 TABLET ORAL at 09:43

## 2019-01-01 RX ADMIN — ZOLPIDEM TARTRATE 5 MG: 5 TABLET ORAL at 21:53

## 2019-01-01 RX ADMIN — ATROPINE SULFATE 2 DROP: 10 SOLUTION OPHTHALMIC at 14:16

## 2019-01-01 RX ADMIN — MORPHINE SULFATE 2 MG: 2 INJECTION, SOLUTION INTRAMUSCULAR; INTRAVENOUS at 03:57

## 2019-01-01 RX ADMIN — ATROPINE SULFATE 2 DROP: 10 SOLUTION OPHTHALMIC at 04:55

## 2019-01-01 RX ADMIN — MINERAL OIL AND WHITE PETROLATUM: 150; 830 OINTMENT OPHTHALMIC at 01:27

## 2019-01-01 RX ADMIN — HYDRALAZINE HYDROCHLORIDE 100 MG: 25 TABLET, FILM COATED ORAL at 14:56

## 2019-01-01 RX ADMIN — OXYCODONE HYDROCHLORIDE AND ACETAMINOPHEN 500 MG: 500 TABLET ORAL at 08:47

## 2019-01-01 RX ADMIN — Medication 10 ML: at 09:05

## 2019-01-01 RX ADMIN — INSULIN LISPRO 40 UNITS: 100 INJECTION, SUSPENSION SUBCUTANEOUS at 21:16

## 2019-01-01 RX ADMIN — NIFEDIPINE 60 MG: 30 TABLET, FILM COATED, EXTENDED RELEASE ORAL at 08:20

## 2019-01-01 RX ADMIN — IPRATROPIUM BROMIDE AND ALBUTEROL SULFATE 1 AMPULE: .5; 3 SOLUTION RESPIRATORY (INHALATION) at 12:52

## 2019-01-01 RX ADMIN — ZOLPIDEM TARTRATE 5 MG: 5 TABLET ORAL at 23:04

## 2019-01-01 RX ADMIN — ISOSORBIDE MONONITRATE 60 MG: 60 TABLET ORAL at 21:08

## 2019-01-01 RX ADMIN — ATROPINE SULFATE 2 DROP: 10 SOLUTION OPHTHALMIC at 17:33

## 2019-01-01 RX ADMIN — INSULIN LISPRO 2 UNITS: 100 INJECTION, SOLUTION INTRAVENOUS; SUBCUTANEOUS at 21:24

## 2019-01-01 RX ADMIN — HYDRALAZINE HYDROCHLORIDE 100 MG: 50 TABLET, FILM COATED ORAL at 09:39

## 2019-01-01 RX ADMIN — SIMVASTATIN 40 MG: 20 TABLET, FILM COATED ORAL at 06:28

## 2019-01-01 RX ADMIN — FAMOTIDINE 20 MG: 20 TABLET ORAL at 08:34

## 2019-01-01 RX ADMIN — ACETYLCYSTEINE 200 MG: 200 SOLUTION ORAL; RESPIRATORY (INHALATION) at 17:37

## 2019-01-01 RX ADMIN — FERROUS SULFATE TAB EC 325 MG (65 MG FE EQUIVALENT) 325 MG: 325 (65 FE) TABLET DELAYED RESPONSE at 12:04

## 2019-01-01 RX ADMIN — ISOSORBIDE MONONITRATE 60 MG: 60 TABLET ORAL at 22:14

## 2019-01-01 RX ADMIN — HYDRALAZINE HYDROCHLORIDE 100 MG: 25 TABLET, FILM COATED ORAL at 06:42

## 2019-01-01 RX ADMIN — Medication 1 MG: at 16:56

## 2019-01-01 RX ADMIN — NIFEDIPINE 60 MG: 30 TABLET, FILM COATED, EXTENDED RELEASE ORAL at 22:00

## 2019-01-01 RX ADMIN — HYDRALAZINE HYDROCHLORIDE 100 MG: 25 TABLET, FILM COATED ORAL at 21:15

## 2019-01-01 RX ADMIN — CARVEDILOL 25 MG: 12.5 TABLET, FILM COATED ORAL at 17:04

## 2019-01-01 RX ADMIN — Medication 10 ML: at 16:27

## 2019-01-01 RX ADMIN — IPRATROPIUM BROMIDE AND ALBUTEROL SULFATE 1 AMPULE: .5; 3 SOLUTION RESPIRATORY (INHALATION) at 05:09

## 2019-01-01 RX ADMIN — IPRATROPIUM BROMIDE AND ALBUTEROL SULFATE 1 AMPULE: .5; 3 SOLUTION RESPIRATORY (INHALATION) at 08:34

## 2019-01-01 RX ADMIN — IPRATROPIUM BROMIDE AND ALBUTEROL SULFATE 1 AMPULE: .5; 3 SOLUTION RESPIRATORY (INHALATION) at 21:05

## 2019-01-01 RX ADMIN — HEPARIN SODIUM 5000 UNITS: 5000 INJECTION INTRAVENOUS; SUBCUTANEOUS at 21:22

## 2019-01-01 RX ADMIN — MORPHINE SULFATE 1 MG: 2 INJECTION, SOLUTION INTRAMUSCULAR; INTRAVENOUS at 02:59

## 2019-01-01 RX ADMIN — FERROUS SULFATE TAB 325 MG (65 MG ELEMENTAL FE) 325 MG: 325 (65 FE) TAB at 16:57

## 2019-01-01 RX ADMIN — GEMFIBROZIL 600 MG: 600 TABLET ORAL at 08:46

## 2019-01-01 RX ADMIN — SODIUM BICARBONATE 650 MG TABLET 650 MG: at 21:07

## 2019-01-01 RX ADMIN — LORAZEPAM 1 MG: 2 INJECTION INTRAMUSCULAR; INTRAVENOUS at 21:32

## 2019-01-01 RX ADMIN — HEPARIN SODIUM 5000 UNITS: 5000 INJECTION INTRAVENOUS; SUBCUTANEOUS at 20:42

## 2019-01-01 RX ADMIN — HYDRALAZINE HYDROCHLORIDE 100 MG: 25 TABLET, FILM COATED ORAL at 06:02

## 2019-01-01 RX ADMIN — Medication 10 ML: at 20:04

## 2019-01-01 RX ADMIN — Medication 500 MG: at 09:39

## 2019-01-01 RX ADMIN — MULTIPLE VITAMINS W/ MINERALS TAB 1 TABLET: TAB at 09:29

## 2019-01-01 RX ADMIN — SODIUM BICARBONATE 650 MG TABLET 650 MG: at 20:29

## 2019-01-01 RX ADMIN — ACETAMINOPHEN 650 MG: 325 TABLET, FILM COATED ORAL at 17:27

## 2019-01-01 RX ADMIN — HYDRALAZINE HYDROCHLORIDE 100 MG: 25 TABLET, FILM COATED ORAL at 05:20

## 2019-01-01 RX ADMIN — IPRATROPIUM BROMIDE AND ALBUTEROL SULFATE 1 AMPULE: .5; 3 SOLUTION RESPIRATORY (INHALATION) at 21:07

## 2019-01-01 RX ADMIN — MORPHINE SULFATE 1 MG: 2 INJECTION, SOLUTION INTRAMUSCULAR; INTRAVENOUS at 09:21

## 2019-01-01 RX ADMIN — SIMVASTATIN 40 MG: 20 TABLET, FILM COATED ORAL at 21:56

## 2019-01-01 RX ADMIN — MORPHINE SULFATE 2 MG: 2 INJECTION, SOLUTION INTRAMUSCULAR; INTRAVENOUS at 14:01

## 2019-01-01 RX ADMIN — CARVEDILOL 12.5 MG: 12.5 TABLET, FILM COATED ORAL at 08:44

## 2019-01-01 RX ADMIN — MORPHINE SULFATE 2 MG: 2 INJECTION, SOLUTION INTRAMUSCULAR; INTRAVENOUS at 19:23

## 2019-01-01 RX ADMIN — ATROPINE SULFATE 2 DROP: 10 SOLUTION OPHTHALMIC at 18:23

## 2019-01-01 RX ADMIN — METHYLPREDNISOLONE SODIUM SUCCINATE 40 MG: 40 INJECTION, POWDER, FOR SOLUTION INTRAMUSCULAR; INTRAVENOUS at 01:08

## 2019-01-01 RX ADMIN — INSULIN LISPRO 2 UNITS: 100 INJECTION, SOLUTION INTRAVENOUS; SUBCUTANEOUS at 12:07

## 2019-01-01 RX ADMIN — MINERAL OIL AND WHITE PETROLATUM: 150; 830 OINTMENT OPHTHALMIC at 02:44

## 2019-01-01 RX ADMIN — SODIUM BICARBONATE 650 MG TABLET 650 MG: at 08:19

## 2019-01-01 RX ADMIN — BUMETANIDE 2 MG: 0.25 INJECTION INTRAMUSCULAR; INTRAVENOUS at 16:55

## 2019-01-01 RX ADMIN — Medication 35 UNITS: at 08:19

## 2019-01-01 RX ADMIN — MAGNESIUM HYDROXIDE 30 ML: 400 SUSPENSION ORAL at 12:08

## 2019-01-01 RX ADMIN — ATROPINE SULFATE 2 DROP: 10 SOLUTION OPHTHALMIC at 10:54

## 2019-01-01 RX ADMIN — FERROUS SULFATE TAB 325 MG (65 MG ELEMENTAL FE) 325 MG: 325 (65 FE) TAB at 09:38

## 2019-01-01 RX ADMIN — IPRATROPIUM BROMIDE AND ALBUTEROL SULFATE 1 AMPULE: .5; 3 SOLUTION RESPIRATORY (INHALATION) at 09:31

## 2019-01-01 RX ADMIN — FERROUS SULFATE TAB 325 MG (65 MG ELEMENTAL FE) 325 MG: 325 (65 FE) TAB at 08:20

## 2019-01-01 RX ADMIN — ISOSORBIDE MONONITRATE 60 MG: 30 TABLET, EXTENDED RELEASE ORAL at 08:19

## 2019-01-01 RX ADMIN — MINERAL OIL AND WHITE PETROLATUM: 150; 830 OINTMENT OPHTHALMIC at 23:41

## 2019-01-01 RX ADMIN — HEPARIN SODIUM 5000 UNITS: 5000 INJECTION INTRAVENOUS; SUBCUTANEOUS at 21:06

## 2019-01-01 RX ADMIN — BUMETANIDE 2 MG: 0.25 INJECTION INTRAMUSCULAR; INTRAVENOUS at 12:09

## 2019-01-01 RX ADMIN — ISOSORBIDE MONONITRATE 60 MG: 60 TABLET ORAL at 09:43

## 2019-01-01 RX ADMIN — Medication 10 ML: at 20:38

## 2019-01-01 RX ADMIN — HYDRALAZINE HYDROCHLORIDE 100 MG: 25 TABLET, FILM COATED ORAL at 23:03

## 2019-01-01 RX ADMIN — ASPIRIN 81 MG: 81 TABLET, COATED ORAL at 08:42

## 2019-01-01 RX ADMIN — Medication 10 ML: at 20:20

## 2019-01-01 RX ADMIN — HYDRALAZINE HYDROCHLORIDE 100 MG: 25 TABLET, FILM COATED ORAL at 14:41

## 2019-01-01 RX ADMIN — SPIRONOLACTONE 25 MG: 25 TABLET, FILM COATED ORAL at 08:34

## 2019-01-01 RX ADMIN — Medication 10 ML: at 17:41

## 2019-01-01 RX ADMIN — FERROUS SULFATE TAB 325 MG (65 MG ELEMENTAL FE) 325 MG: 325 (65 FE) TAB at 17:05

## 2019-01-01 RX ADMIN — INSULIN LISPRO 2 UNITS: 100 INJECTION, SOLUTION INTRAVENOUS; SUBCUTANEOUS at 16:49

## 2019-01-01 RX ADMIN — INSULIN LISPRO 1 UNITS: 100 INJECTION, SOLUTION INTRAVENOUS; SUBCUTANEOUS at 16:44

## 2019-01-01 RX ADMIN — NIFEDIPINE 60 MG: 30 TABLET, FILM COATED, EXTENDED RELEASE ORAL at 21:07

## 2019-01-01 RX ADMIN — MORPHINE SULFATE 10 MG: 100 SOLUTION ORAL at 08:37

## 2019-01-01 RX ADMIN — ASPIRIN 81 MG: 81 TABLET, COATED ORAL at 09:25

## 2019-01-01 RX ADMIN — ZOLPIDEM TARTRATE 5 MG: 5 TABLET ORAL at 23:57

## 2019-01-01 RX ADMIN — ATROPINE SULFATE 2 DROP: 10 SOLUTION OPHTHALMIC at 09:52

## 2019-01-01 RX ADMIN — ISOSORBIDE MONONITRATE 60 MG: 60 TABLET ORAL at 09:38

## 2019-01-01 RX ADMIN — CARVEDILOL 12.5 MG: 12.5 TABLET, FILM COATED ORAL at 21:15

## 2019-01-01 RX ADMIN — MORPHINE SULFATE 1 MG: 2 INJECTION, SOLUTION INTRAMUSCULAR; INTRAVENOUS at 07:00

## 2019-01-01 RX ADMIN — MAGNESIUM HYDROXIDE 30 ML: 400 SUSPENSION ORAL at 08:33

## 2019-01-01 RX ADMIN — MINERAL OIL AND WHITE PETROLATUM: 150; 830 OINTMENT OPHTHALMIC at 23:05

## 2019-01-01 RX ADMIN — BUMETANIDE 2 MG: 1 TABLET ORAL at 08:19

## 2019-01-01 RX ADMIN — BUMETANIDE 2 MG: 1 TABLET ORAL at 08:22

## 2019-01-01 RX ADMIN — HEPARIN SODIUM 5000 UNITS: 5000 INJECTION INTRAVENOUS; SUBCUTANEOUS at 08:41

## 2019-01-01 RX ADMIN — MINERAL OIL AND WHITE PETROLATUM: 150; 830 OINTMENT OPHTHALMIC at 20:07

## 2019-01-01 RX ADMIN — MULTIPLE VITAMINS W/ MINERALS TAB 1 TABLET: TAB at 16:42

## 2019-01-01 RX ADMIN — FAMOTIDINE 20 MG: 20 TABLET ORAL at 08:18

## 2019-01-01 RX ADMIN — IPRATROPIUM BROMIDE AND ALBUTEROL SULFATE 1 AMPULE: .5; 3 SOLUTION RESPIRATORY (INHALATION) at 09:29

## 2019-01-01 RX ADMIN — BUMETANIDE 2 MG: 1 TABLET ORAL at 16:43

## 2019-01-01 RX ADMIN — IPRATROPIUM BROMIDE AND ALBUTEROL SULFATE 1 AMPULE: .5; 3 SOLUTION RESPIRATORY (INHALATION) at 09:36

## 2019-01-01 RX ADMIN — NIFEDIPINE 60 MG: 30 TABLET, FILM COATED, EXTENDED RELEASE ORAL at 08:44

## 2019-01-01 RX ADMIN — INSULIN LISPRO 3 UNITS: 100 INJECTION, SOLUTION INTRAVENOUS; SUBCUTANEOUS at 07:37

## 2019-01-01 RX ADMIN — NIFEDIPINE 60 MG: 30 TABLET, FILM COATED, EXTENDED RELEASE ORAL at 09:27

## 2019-01-01 RX ADMIN — FERROUS SULFATE TAB 325 MG (65 MG ELEMENTAL FE) 325 MG: 325 (65 FE) TAB at 21:56

## 2019-01-01 RX ADMIN — Medication 10 ML: at 07:57

## 2019-01-01 RX ADMIN — CARVEDILOL 12.5 MG: 12.5 TABLET, FILM COATED ORAL at 16:57

## 2019-01-01 RX ADMIN — HYDRALAZINE HYDROCHLORIDE 100 MG: 25 TABLET, FILM COATED ORAL at 21:56

## 2019-01-01 RX ADMIN — Medication 10 ML: at 22:18

## 2019-01-01 RX ADMIN — Medication 20 UNITS: at 08:24

## 2019-01-01 RX ADMIN — SIMVASTATIN 40 MG: 20 TABLET, FILM COATED ORAL at 20:45

## 2019-01-01 RX ADMIN — SODIUM BICARBONATE 650 MG TABLET 650 MG: at 07:57

## 2019-01-01 RX ADMIN — IPRATROPIUM BROMIDE AND ALBUTEROL SULFATE 1 AMPULE: .5; 3 SOLUTION RESPIRATORY (INHALATION) at 17:40

## 2019-01-01 RX ADMIN — HYDRALAZINE HYDROCHLORIDE 100 MG: 25 TABLET, FILM COATED ORAL at 06:04

## 2019-01-01 RX ADMIN — MINERAL OIL AND WHITE PETROLATUM: 150; 830 OINTMENT OPHTHALMIC at 02:51

## 2019-01-01 RX ADMIN — ZOLPIDEM TARTRATE 5 MG: 5 TABLET ORAL at 23:36

## 2019-01-01 RX ADMIN — ASPIRIN 81 MG: 81 TABLET, COATED ORAL at 09:07

## 2019-01-01 RX ADMIN — SODIUM BICARBONATE 650 MG TABLET 650 MG: at 20:30

## 2019-01-01 RX ADMIN — ATROPINE SULFATE 2 DROP: 10 SOLUTION/ DROPS OPHTHALMIC at 06:20

## 2019-01-01 RX ADMIN — DULOXETINE HYDROCHLORIDE 20 MG: 20 CAPSULE, DELAYED RELEASE ORAL at 08:45

## 2019-01-01 RX ADMIN — Medication 1 MG: at 10:53

## 2019-01-01 RX ADMIN — ATROPINE SULFATE 2 DROP: 10 SOLUTION OPHTHALMIC at 08:34

## 2019-01-01 RX ADMIN — HEPARIN SODIUM 5000 UNITS: 5000 INJECTION INTRAVENOUS; SUBCUTANEOUS at 21:15

## 2019-01-01 RX ADMIN — Medication 10 ML: at 01:03

## 2019-01-01 RX ADMIN — Medication 10 ML: at 20:40

## 2019-01-01 RX ADMIN — MORPHINE SULFATE 2 MG: 4 INJECTION INTRAVENOUS at 02:37

## 2019-01-01 RX ADMIN — BUMETANIDE 2 MG: 1 TABLET ORAL at 17:04

## 2019-01-01 RX ADMIN — Medication 500 MG: at 16:42

## 2019-01-01 RX ADMIN — SODIUM BICARBONATE 650 MG: 650 TABLET ORAL at 09:38

## 2019-01-01 RX ADMIN — INSULIN LISPRO 3 UNITS: 100 INJECTION, SOLUTION INTRAVENOUS; SUBCUTANEOUS at 11:54

## 2019-01-01 RX ADMIN — VANCOMYCIN HYDROCHLORIDE 1250 MG: 1 INJECTION, POWDER, LYOPHILIZED, FOR SOLUTION INTRAVENOUS at 23:00

## 2019-01-01 RX ADMIN — ZOLPIDEM TARTRATE 5 MG: 5 TABLET ORAL at 20:31

## 2019-01-01 RX ADMIN — Medication 10 ML: at 21:27

## 2019-01-01 RX ADMIN — Medication 10 ML: at 08:31

## 2019-01-01 RX ADMIN — IPRATROPIUM BROMIDE AND ALBUTEROL SULFATE 1 AMPULE: .5; 3 SOLUTION RESPIRATORY (INHALATION) at 06:14

## 2019-01-01 RX ADMIN — SODIUM BICARBONATE 650 MG TABLET 650 MG: at 09:38

## 2019-01-01 RX ADMIN — OXYCODONE HYDROCHLORIDE AND ACETAMINOPHEN 500 MG: 500 TABLET ORAL at 09:25

## 2019-01-01 RX ADMIN — HYDRALAZINE HYDROCHLORIDE 100 MG: 50 TABLET, FILM COATED ORAL at 13:58

## 2019-01-01 RX ADMIN — ZOLPIDEM TARTRATE 5 MG: 5 TABLET ORAL at 20:41

## 2019-01-01 RX ADMIN — IPRATROPIUM BROMIDE AND ALBUTEROL SULFATE 1 AMPULE: .5; 3 SOLUTION RESPIRATORY (INHALATION) at 05:45

## 2019-01-01 RX ADMIN — HYDRALAZINE HYDROCHLORIDE 100 MG: 50 TABLET, FILM COATED ORAL at 12:07

## 2019-01-01 RX ADMIN — Medication 1 MG: at 22:14

## 2019-01-01 RX ADMIN — LORAZEPAM 1 MG: 2 INJECTION INTRAMUSCULAR; INTRAVENOUS at 05:03

## 2019-01-01 RX ADMIN — SODIUM BICARBONATE 650 MG TABLET 650 MG: at 08:45

## 2019-01-01 RX ADMIN — FLUTICASONE PROPIONATE 1 SPRAY: 50 SPRAY, METERED NASAL at 07:58

## 2019-01-01 RX ADMIN — INSULIN LISPRO 15 UNITS: 100 INJECTION, SOLUTION INTRAVENOUS; SUBCUTANEOUS at 21:54

## 2019-01-01 RX ADMIN — Medication 10 ML: at 10:44

## 2019-01-01 RX ADMIN — ISOSORBIDE MONONITRATE 60 MG: 30 TABLET, EXTENDED RELEASE ORAL at 08:45

## 2019-01-01 RX ADMIN — ISOSORBIDE MONONITRATE 60 MG: 30 TABLET, EXTENDED RELEASE ORAL at 09:07

## 2019-01-01 RX ADMIN — Medication 1 MG: at 12:00

## 2019-01-01 RX ADMIN — ISOSORBIDE MONONITRATE 60 MG: 60 TABLET ORAL at 16:42

## 2019-01-01 RX ADMIN — MINERAL OIL AND WHITE PETROLATUM: 150; 830 OINTMENT OPHTHALMIC at 12:47

## 2019-01-01 RX ADMIN — FAMOTIDINE 20 MG: 20 TABLET ORAL at 09:07

## 2019-01-01 RX ADMIN — MULTIPLE VITAMINS W/ MINERALS TAB 1 TABLET: TAB at 11:54

## 2019-01-01 RX ADMIN — IPRATROPIUM BROMIDE AND ALBUTEROL SULFATE 1 AMPULE: .5; 3 SOLUTION RESPIRATORY (INHALATION) at 16:30

## 2019-01-01 RX ADMIN — Medication 10 ML: at 09:21

## 2019-01-01 RX ADMIN — NIFEDIPINE 60 MG: 30 TABLET, FILM COATED, EXTENDED RELEASE ORAL at 09:43

## 2019-01-01 RX ADMIN — MORPHINE SULFATE 10 MG: 100 SOLUTION ORAL at 15:36

## 2019-01-01 RX ADMIN — MINERAL OIL AND WHITE PETROLATUM: 150; 830 OINTMENT OPHTHALMIC at 10:20

## 2019-01-01 RX ADMIN — SODIUM BICARBONATE 650 MG TABLET 650 MG: at 08:00

## 2019-01-01 RX ADMIN — INSULIN LISPRO 2 UNITS: 100 INJECTION, SOLUTION INTRAVENOUS; SUBCUTANEOUS at 12:25

## 2019-01-01 RX ADMIN — NIFEDIPINE 60 MG: 30 TABLET, FILM COATED, EXTENDED RELEASE ORAL at 20:47

## 2019-01-01 RX ADMIN — NIFEDIPINE 60 MG: 30 TABLET, FILM COATED, EXTENDED RELEASE ORAL at 20:12

## 2019-01-01 RX ADMIN — FERROUS SULFATE TAB 325 MG (65 MG ELEMENTAL FE) 325 MG: 325 (65 FE) TAB at 08:00

## 2019-01-01 RX ADMIN — PANTOPRAZOLE SODIUM 8 MG/HR: 40 INJECTION, POWDER, FOR SOLUTION INTRAVENOUS at 01:39

## 2019-01-01 RX ADMIN — IPRATROPIUM BROMIDE AND ALBUTEROL SULFATE 1 AMPULE: .5; 3 SOLUTION RESPIRATORY (INHALATION) at 17:09

## 2019-01-01 RX ADMIN — FLUTICASONE PROPIONATE 1 SPRAY: 50 SPRAY, METERED NASAL at 09:12

## 2019-01-01 RX ADMIN — ATROPINE SULFATE 2 DROP: 10 SOLUTION/ DROPS OPHTHALMIC at 01:27

## 2019-01-01 RX ADMIN — ATROPINE SULFATE 2 DROP: 10 SOLUTION/ DROPS OPHTHALMIC at 02:51

## 2019-01-01 RX ADMIN — HEPARIN SODIUM 5000 UNITS: 5000 INJECTION INTRAVENOUS; SUBCUTANEOUS at 09:07

## 2019-01-01 RX ADMIN — LORAZEPAM 1 MG: 2 INJECTION INTRAMUSCULAR; INTRAVENOUS at 07:57

## 2019-01-01 RX ADMIN — LORAZEPAM 0.5 MG: 0.5 TABLET ORAL at 03:51

## 2019-01-01 RX ADMIN — FUROSEMIDE 20 MG: 10 INJECTION, SOLUTION INTRAMUSCULAR; INTRAVENOUS at 09:23

## 2019-01-01 RX ADMIN — FAMOTIDINE 20 MG: 20 TABLET ORAL at 09:25

## 2019-01-01 RX ADMIN — Medication 10 ML: at 09:44

## 2019-01-01 RX ADMIN — LORAZEPAM 0.5 MG: 0.5 TABLET ORAL at 21:07

## 2019-01-01 RX ADMIN — HYDRALAZINE HYDROCHLORIDE 100 MG: 25 TABLET, FILM COATED ORAL at 21:53

## 2019-01-01 RX ADMIN — SODIUM BICARBONATE 650 MG TABLET 650 MG: at 21:09

## 2019-01-01 RX ADMIN — SIMVASTATIN 40 MG: 20 TABLET, FILM COATED ORAL at 21:09

## 2019-01-01 RX ADMIN — LORAZEPAM 1 MG: 2 INJECTION INTRAMUSCULAR; INTRAVENOUS at 14:44

## 2019-01-01 RX ADMIN — MORPHINE SULFATE 2 MG: 4 INJECTION INTRAVENOUS at 01:22

## 2019-01-01 RX ADMIN — BUMETANIDE 1 MG: 1 TABLET ORAL at 09:39

## 2019-01-01 RX ADMIN — MORPHINE SULFATE 2 MG: 2 INJECTION, SOLUTION INTRAMUSCULAR; INTRAVENOUS at 20:54

## 2019-01-01 RX ADMIN — INSULIN LISPRO 4 UNITS: 100 INJECTION, SOLUTION INTRAVENOUS; SUBCUTANEOUS at 16:50

## 2019-01-01 RX ADMIN — HYDRALAZINE HYDROCHLORIDE 100 MG: 50 TABLET, FILM COATED ORAL at 21:56

## 2019-01-01 RX ADMIN — HEPARIN SODIUM 5000 UNITS: 5000 INJECTION INTRAVENOUS; SUBCUTANEOUS at 07:56

## 2019-01-01 RX ADMIN — NIFEDIPINE 60 MG: 30 TABLET, FILM COATED, EXTENDED RELEASE ORAL at 09:26

## 2019-01-01 RX ADMIN — CARVEDILOL 12.5 MG: 12.5 TABLET, FILM COATED ORAL at 08:22

## 2019-01-01 RX ADMIN — Medication 10 UNITS: at 16:57

## 2019-01-01 RX ADMIN — ASPIRIN 325 MG: 325 TABLET, DELAYED RELEASE ORAL at 09:38

## 2019-01-01 RX ADMIN — HEPARIN SODIUM 5000 UNITS: 5000 INJECTION INTRAVENOUS; SUBCUTANEOUS at 09:12

## 2019-01-01 RX ADMIN — HYDRALAZINE HYDROCHLORIDE 100 MG: 25 TABLET, FILM COATED ORAL at 05:48

## 2019-01-01 RX ADMIN — ISOSORBIDE MONONITRATE 60 MG: 30 TABLET, EXTENDED RELEASE ORAL at 16:57

## 2019-01-01 RX ADMIN — SODIUM BICARBONATE 650 MG TABLET 650 MG: at 09:06

## 2019-01-01 RX ADMIN — CARVEDILOL 25 MG: 12.5 TABLET, FILM COATED ORAL at 17:29

## 2019-01-01 RX ADMIN — INSULIN LISPRO 2 UNITS: 100 INJECTION, SOLUTION INTRAVENOUS; SUBCUTANEOUS at 17:05

## 2019-01-01 RX ADMIN — CARVEDILOL 12.5 MG: 12.5 TABLET, FILM COATED ORAL at 20:45

## 2019-01-01 RX ADMIN — GUAIFENESIN 600 MG: 600 TABLET, EXTENDED RELEASE ORAL at 08:00

## 2019-01-01 RX ADMIN — ATROPINE SULFATE 2 DROP: 10 SOLUTION/ DROPS OPHTHALMIC at 09:15

## 2019-01-01 RX ADMIN — BUMETANIDE 2 MG: 1 TABLET ORAL at 17:15

## 2019-01-01 RX ADMIN — Medication 10 ML: at 14:28

## 2019-01-01 RX ADMIN — CARVEDILOL 12.5 MG: 12.5 TABLET, FILM COATED ORAL at 09:26

## 2019-01-01 RX ADMIN — Medication 10 ML: at 08:19

## 2019-01-01 RX ADMIN — DARBEPOETIN ALFA 60 MCG: 60 INJECTION, SOLUTION INTRAVENOUS; SUBCUTANEOUS at 17:45

## 2019-01-01 RX ADMIN — FAMOTIDINE 20 MG: 20 TABLET ORAL at 08:42

## 2019-01-01 RX ADMIN — ATROPINE SULFATE 2 DROP: 10 SOLUTION OPHTHALMIC at 13:24

## 2019-01-01 RX ADMIN — CALCIUM GLUCONATE 1 G: 98 INJECTION, SOLUTION INTRAVENOUS at 00:30

## 2019-01-01 RX ADMIN — HYDRALAZINE HYDROCHLORIDE 100 MG: 25 TABLET, FILM COATED ORAL at 20:47

## 2019-01-01 RX ADMIN — INSULIN LISPRO 50 UNITS: 100 INJECTION, SUSPENSION SUBCUTANEOUS at 09:32

## 2019-01-01 RX ADMIN — ASPIRIN 81 MG: 81 TABLET, COATED ORAL at 07:45

## 2019-01-01 RX ADMIN — IPRATROPIUM BROMIDE AND ALBUTEROL SULFATE 1 AMPULE: .5; 3 SOLUTION RESPIRATORY (INHALATION) at 09:06

## 2019-01-01 RX ADMIN — ISOSORBIDE MONONITRATE 60 MG: 30 TABLET, EXTENDED RELEASE ORAL at 17:33

## 2019-01-01 RX ADMIN — SIMVASTATIN 40 MG: 20 TABLET, FILM COATED ORAL at 20:29

## 2019-01-01 RX ADMIN — GUAIFENESIN 600 MG: 600 TABLET, EXTENDED RELEASE ORAL at 21:09

## 2019-01-01 RX ADMIN — ISOSORBIDE MONONITRATE 60 MG: 30 TABLET, EXTENDED RELEASE ORAL at 09:39

## 2019-01-01 RX ADMIN — GUAIFENESIN 600 MG: 600 TABLET, EXTENDED RELEASE ORAL at 08:42

## 2019-01-01 RX ADMIN — NIFEDIPINE 60 MG: 30 TABLET, FILM COATED, EXTENDED RELEASE ORAL at 20:28

## 2019-01-01 RX ADMIN — MINERAL OIL AND WHITE PETROLATUM: 150; 830 OINTMENT OPHTHALMIC at 20:23

## 2019-01-01 RX ADMIN — INSULIN LISPRO 50 UNITS: 100 INJECTION, SUSPENSION SUBCUTANEOUS at 10:30

## 2019-01-01 RX ADMIN — INSULIN LISPRO 2 UNITS: 100 INJECTION, SOLUTION INTRAVENOUS; SUBCUTANEOUS at 21:22

## 2019-01-01 RX ADMIN — LORAZEPAM 1 MG: 2 INJECTION INTRAMUSCULAR; INTRAVENOUS at 12:33

## 2019-01-01 RX ADMIN — OXYCODONE HYDROCHLORIDE AND ACETAMINOPHEN 500 MG: 500 TABLET ORAL at 08:34

## 2019-01-01 RX ADMIN — GUAIFENESIN 600 MG: 600 TABLET, EXTENDED RELEASE ORAL at 08:45

## 2019-01-01 RX ADMIN — IPRATROPIUM BROMIDE AND ALBUTEROL SULFATE 1 AMPULE: .5; 3 SOLUTION RESPIRATORY (INHALATION) at 21:45

## 2019-01-01 RX ADMIN — ISOSORBIDE MONONITRATE 60 MG: 30 TABLET, EXTENDED RELEASE ORAL at 16:54

## 2019-01-01 RX ADMIN — ATROPINE SULFATE 2 DROP: 10 SOLUTION OPHTHALMIC at 20:29

## 2019-01-01 RX ADMIN — HYDRALAZINE HYDROCHLORIDE 100 MG: 25 TABLET, FILM COATED ORAL at 14:19

## 2019-01-01 RX ADMIN — SODIUM BICARBONATE 650 MG TABLET 650 MG: at 20:41

## 2019-01-01 RX ADMIN — CARVEDILOL 25 MG: 12.5 TABLET, FILM COATED ORAL at 09:07

## 2019-01-01 RX ADMIN — GUAIFENESIN 600 MG: 600 TABLET, EXTENDED RELEASE ORAL at 21:25

## 2019-01-01 RX ADMIN — MORPHINE SULFATE 10 MG: 100 SOLUTION ORAL at 18:14

## 2019-01-01 RX ADMIN — FLUTICASONE PROPIONATE 1 SPRAY: 50 SPRAY, METERED NASAL at 08:28

## 2019-01-01 RX ADMIN — SPIRONOLACTONE 25 MG: 25 TABLET, FILM COATED ORAL at 09:25

## 2019-01-01 RX ADMIN — HYDRALAZINE HYDROCHLORIDE 100 MG: 25 TABLET, FILM COATED ORAL at 14:30

## 2019-01-01 RX ADMIN — ACETAMINOPHEN 650 MG: 325 TABLET, FILM COATED ORAL at 14:33

## 2019-01-01 RX ADMIN — GUAIFENESIN 600 MG: 600 TABLET, EXTENDED RELEASE ORAL at 21:15

## 2019-01-01 RX ADMIN — ATROPINE SULFATE 2 DROP: 10 SOLUTION/ DROPS OPHTHALMIC at 05:29

## 2019-01-01 RX ADMIN — ATROPINE SULFATE 2 DROP: 10 SOLUTION/ DROPS OPHTHALMIC at 12:47

## 2019-01-01 RX ADMIN — Medication 10 ML: at 21:59

## 2019-01-01 RX ADMIN — MINERAL OIL AND WHITE PETROLATUM: 150; 830 OINTMENT OPHTHALMIC at 18:14

## 2019-01-01 RX ADMIN — FERROUS SULFATE TAB 325 MG (65 MG ELEMENTAL FE) 325 MG: 325 (65 FE) TAB at 20:44

## 2019-01-01 RX ADMIN — HYDRALAZINE HYDROCHLORIDE 50 MG: 25 TABLET, FILM COATED ORAL at 08:46

## 2019-01-01 RX ADMIN — IPRATROPIUM BROMIDE AND ALBUTEROL SULFATE 1 AMPULE: .5; 3 SOLUTION RESPIRATORY (INHALATION) at 16:20

## 2019-01-01 RX ADMIN — INSULIN LISPRO 1 UNITS: 100 INJECTION, SOLUTION INTRAVENOUS; SUBCUTANEOUS at 11:46

## 2019-01-01 RX ADMIN — HYDRALAZINE HYDROCHLORIDE 100 MG: 25 TABLET, FILM COATED ORAL at 06:37

## 2019-01-01 RX ADMIN — METFORMIN HYDROCHLORIDE 500 MG: 500 TABLET ORAL at 17:55

## 2019-01-01 RX ADMIN — CARVEDILOL 12.5 MG: 12.5 TABLET, FILM COATED ORAL at 09:25

## 2019-01-01 RX ADMIN — GUAIFENESIN 600 MG: 600 TABLET, EXTENDED RELEASE ORAL at 09:06

## 2019-01-01 RX ADMIN — HYDRALAZINE HYDROCHLORIDE 100 MG: 50 TABLET, FILM COATED ORAL at 09:26

## 2019-01-01 RX ADMIN — HYDRALAZINE HYDROCHLORIDE 100 MG: 25 TABLET, FILM COATED ORAL at 14:24

## 2019-01-01 RX ADMIN — ISOSORBIDE MONONITRATE 60 MG: 30 TABLET, EXTENDED RELEASE ORAL at 07:57

## 2019-01-01 RX ADMIN — ATROPINE SULFATE 2 DROP: 10 SOLUTION/ DROPS OPHTHALMIC at 14:47

## 2019-01-01 RX ADMIN — ASPIRIN 81 MG: 81 TABLET, COATED ORAL at 08:37

## 2019-01-01 RX ADMIN — ASPIRIN 81 MG: 81 TABLET, COATED ORAL at 09:22

## 2019-01-01 RX ADMIN — FERROUS SULFATE TAB 325 MG (65 MG ELEMENTAL FE) 325 MG: 325 (65 FE) TAB at 01:12

## 2019-01-01 RX ADMIN — MORPHINE SULFATE 2 MG: 2 INJECTION, SOLUTION INTRAMUSCULAR; INTRAVENOUS at 16:27

## 2019-01-01 RX ADMIN — CARVEDILOL 12.5 MG: 12.5 TABLET, FILM COATED ORAL at 21:56

## 2019-01-01 RX ADMIN — GEMFIBROZIL 600 MG: 600 TABLET ORAL at 09:29

## 2019-01-01 RX ADMIN — ATROPINE SULFATE 2 DROP: 10 SOLUTION OPHTHALMIC at 22:54

## 2019-01-01 RX ADMIN — CARVEDILOL 12.5 MG: 12.5 TABLET, FILM COATED ORAL at 08:00

## 2019-01-01 RX ADMIN — FERROUS SULFATE TAB 325 MG (65 MG ELEMENTAL FE) 325 MG: 325 (65 FE) TAB at 07:57

## 2019-01-01 RX ADMIN — ATROPINE SULFATE 2 DROP: 10 SOLUTION OPHTHALMIC at 12:28

## 2019-01-01 RX ADMIN — ISOSORBIDE MONONITRATE 60 MG: 30 TABLET, EXTENDED RELEASE ORAL at 09:06

## 2019-01-01 RX ADMIN — HYDRALAZINE HYDROCHLORIDE 100 MG: 50 TABLET, FILM COATED ORAL at 22:15

## 2019-01-01 RX ADMIN — INSULIN LISPRO 2 UNITS: 100 INJECTION, SOLUTION INTRAVENOUS; SUBCUTANEOUS at 11:50

## 2019-01-01 RX ADMIN — CARVEDILOL 12.5 MG: 12.5 TABLET, FILM COATED ORAL at 22:11

## 2019-01-01 RX ADMIN — ATROPINE SULFATE 2 DROP: 10 SOLUTION/ DROPS OPHTHALMIC at 13:07

## 2019-01-01 RX ADMIN — IPRATROPIUM BROMIDE AND ALBUTEROL SULFATE 1 AMPULE: .5; 3 SOLUTION RESPIRATORY (INHALATION) at 17:32

## 2019-01-01 RX ADMIN — ACETYLCYSTEINE 200 MG: 200 SOLUTION ORAL; RESPIRATORY (INHALATION) at 21:04

## 2019-01-01 RX ADMIN — INSULIN LISPRO 3 UNITS: 100 INJECTION, SOLUTION INTRAVENOUS; SUBCUTANEOUS at 12:00

## 2019-01-01 RX ADMIN — MORPHINE SULFATE 10 MG: 100 SOLUTION ORAL at 16:50

## 2019-01-01 RX ADMIN — Medication 10 ML: at 19:23

## 2019-01-01 RX ADMIN — INSULIN LISPRO 3 UNITS: 100 INJECTION, SOLUTION INTRAVENOUS; SUBCUTANEOUS at 16:56

## 2019-01-01 RX ADMIN — ATROPINE SULFATE 2 DROP: 10 SOLUTION OPHTHALMIC at 00:52

## 2019-01-01 RX ADMIN — Medication 1 MG: at 18:25

## 2019-01-01 RX ADMIN — HYDRALAZINE HYDROCHLORIDE 100 MG: 25 TABLET, FILM COATED ORAL at 21:21

## 2019-01-01 RX ADMIN — CARVEDILOL 12.5 MG: 12.5 TABLET, FILM COATED ORAL at 09:38

## 2019-01-01 RX ADMIN — DULOXETINE HYDROCHLORIDE 20 MG: 20 CAPSULE, DELAYED RELEASE ORAL at 08:19

## 2019-01-01 RX ADMIN — ATROPINE SULFATE 2 DROP: 10 SOLUTION OPHTHALMIC at 02:44

## 2019-01-01 RX ADMIN — ASPIRIN 81 MG: 81 TABLET, COATED ORAL at 08:34

## 2019-01-01 RX ADMIN — MORPHINE SULFATE 1 MG: 2 INJECTION, SOLUTION INTRAMUSCULAR; INTRAVENOUS at 13:43

## 2019-01-01 RX ADMIN — FLUTICASONE PROPIONATE 1 SPRAY: 50 SPRAY, METERED NASAL at 09:32

## 2019-01-01 RX ADMIN — NIFEDIPINE 60 MG: 30 TABLET, FILM COATED, EXTENDED RELEASE ORAL at 20:41

## 2019-01-01 RX ADMIN — SODIUM BICARBONATE 1300 MG: 650 TABLET ORAL at 09:26

## 2019-01-01 RX ADMIN — LORAZEPAM 0.5 MG: 0.5 TABLET ORAL at 12:43

## 2019-01-01 RX ADMIN — IPRATROPIUM BROMIDE AND ALBUTEROL SULFATE 1 AMPULE: .5; 3 SOLUTION RESPIRATORY (INHALATION) at 21:04

## 2019-01-01 RX ADMIN — MORPHINE SULFATE 2 MG: 2 INJECTION, SOLUTION INTRAMUSCULAR; INTRAVENOUS at 17:10

## 2019-01-01 RX ADMIN — Medication 500 MG: at 09:29

## 2019-01-01 RX ADMIN — ISOSORBIDE MONONITRATE 60 MG: 60 TABLET ORAL at 09:39

## 2019-01-01 RX ADMIN — IPRATROPIUM BROMIDE AND ALBUTEROL SULFATE 1 AMPULE: .5; 3 SOLUTION RESPIRATORY (INHALATION) at 12:57

## 2019-01-01 RX ADMIN — GUAIFENESIN 600 MG: 600 TABLET, EXTENDED RELEASE ORAL at 20:41

## 2019-01-01 RX ADMIN — LEVOFLOXACIN 750 MG: 5 INJECTION, SOLUTION INTRAVENOUS at 06:03

## 2019-01-01 RX ADMIN — IPRATROPIUM BROMIDE AND ALBUTEROL SULFATE 1 AMPULE: .5; 3 SOLUTION RESPIRATORY (INHALATION) at 16:57

## 2019-01-01 RX ADMIN — HEPARIN SODIUM 5000 UNITS: 5000 INJECTION INTRAVENOUS; SUBCUTANEOUS at 01:13

## 2019-01-01 RX ADMIN — ISOSORBIDE MONONITRATE 60 MG: 30 TABLET, EXTENDED RELEASE ORAL at 09:25

## 2019-01-01 RX ADMIN — CARVEDILOL 12.5 MG: 12.5 TABLET, FILM COATED ORAL at 21:08

## 2019-01-01 RX ADMIN — ISOSORBIDE MONONITRATE 60 MG: 30 TABLET, EXTENDED RELEASE ORAL at 11:49

## 2019-01-01 RX ADMIN — HYDRALAZINE HYDROCHLORIDE 100 MG: 25 TABLET, FILM COATED ORAL at 20:31

## 2019-01-01 RX ADMIN — BUMETANIDE 2 MG: 1 TABLET ORAL at 17:05

## 2019-01-01 RX ADMIN — CARVEDILOL 12.5 MG: 12.5 TABLET, FILM COATED ORAL at 20:44

## 2019-01-01 RX ADMIN — MORPHINE SULFATE 10 MG: 100 SOLUTION ORAL at 14:03

## 2019-01-01 RX ADMIN — HYDRALAZINE HYDROCHLORIDE 100 MG: 50 TABLET, FILM COATED ORAL at 09:29

## 2019-01-01 RX ADMIN — NIFEDIPINE 60 MG: 30 TABLET, FILM COATED, EXTENDED RELEASE ORAL at 08:46

## 2019-01-01 RX ADMIN — HEPARIN SODIUM 5000 UNITS: 5000 INJECTION INTRAVENOUS; SUBCUTANEOUS at 09:27

## 2019-01-01 RX ADMIN — Medication 10 ML: at 20:39

## 2019-01-01 RX ADMIN — ZOLPIDEM TARTRATE 5 MG: 5 TABLET ORAL at 21:50

## 2019-01-01 RX ADMIN — INSULIN LISPRO 1 UNITS: 100 INJECTION, SOLUTION INTRAVENOUS; SUBCUTANEOUS at 21:06

## 2019-01-01 RX ADMIN — LORAZEPAM 2 MG: 2 INJECTION INTRAMUSCULAR; INTRAVENOUS at 03:05

## 2019-01-01 RX ADMIN — INSULIN LISPRO 2 UNITS: 100 INJECTION, SOLUTION INTRAVENOUS; SUBCUTANEOUS at 16:52

## 2019-01-01 RX ADMIN — ASPIRIN 81 MG: 81 TABLET, COATED ORAL at 07:58

## 2019-01-01 RX ADMIN — INSULIN LISPRO 3 UNITS: 100 INJECTION, SOLUTION INTRAVENOUS; SUBCUTANEOUS at 08:41

## 2019-01-01 RX ADMIN — ISOSORBIDE MONONITRATE 60 MG: 30 TABLET, EXTENDED RELEASE ORAL at 17:11

## 2019-01-01 RX ADMIN — HYDRALAZINE HYDROCHLORIDE 100 MG: 25 TABLET, FILM COATED ORAL at 20:41

## 2019-01-01 RX ADMIN — Medication 500 MG: at 09:37

## 2019-01-01 RX ADMIN — FLUTICASONE PROPIONATE 1 SPRAY: 50 SPRAY, METERED NASAL at 08:27

## 2019-01-01 RX ADMIN — HYDRALAZINE HYDROCHLORIDE 10 MG: 20 INJECTION INTRAMUSCULAR; INTRAVENOUS at 00:44

## 2019-01-01 RX ADMIN — ATROPINE SULFATE 2 DROP: 10 SOLUTION/ DROPS OPHTHALMIC at 01:38

## 2019-01-01 RX ADMIN — LORAZEPAM 1 MG: 2 INJECTION INTRAMUSCULAR; INTRAVENOUS at 15:37

## 2019-01-01 RX ADMIN — INSULIN LISPRO 1 UNITS: 100 INJECTION, SOLUTION INTRAVENOUS; SUBCUTANEOUS at 21:16

## 2019-01-01 RX ADMIN — NIFEDIPINE 60 MG: 30 TABLET, FILM COATED, EXTENDED RELEASE ORAL at 22:15

## 2019-01-01 RX ADMIN — TORSEMIDE 20 MG: 10 TABLET ORAL at 07:44

## 2019-01-01 RX ADMIN — ASPIRIN 81 MG: 81 TABLET, COATED ORAL at 08:22

## 2019-01-01 RX ADMIN — DULOXETINE HYDROCHLORIDE 20 MG: 20 CAPSULE, DELAYED RELEASE ORAL at 08:42

## 2019-01-01 RX ADMIN — INSULIN LISPRO 2 UNITS: 100 INJECTION, SOLUTION INTRAVENOUS; SUBCUTANEOUS at 08:41

## 2019-01-01 RX ADMIN — INSULIN LISPRO 2 UNITS: 100 INJECTION, SOLUTION INTRAVENOUS; SUBCUTANEOUS at 08:11

## 2019-01-01 RX ADMIN — OXYCODONE HYDROCHLORIDE AND ACETAMINOPHEN 500 MG: 500 TABLET ORAL at 07:57

## 2019-01-01 RX ADMIN — HYDRALAZINE HYDROCHLORIDE 100 MG: 50 TABLET, FILM COATED ORAL at 09:37

## 2019-01-01 RX ADMIN — ATROPINE SULFATE 2 DROP: 10 SOLUTION OPHTHALMIC at 14:02

## 2019-01-01 RX ADMIN — PANTOPRAZOLE SODIUM 40 MG: 40 TABLET, DELAYED RELEASE ORAL at 12:00

## 2019-01-01 RX ADMIN — SODIUM BICARBONATE 650 MG TABLET 650 MG: at 08:37

## 2019-01-01 RX ADMIN — ISOSORBIDE MONONITRATE 60 MG: 30 TABLET, EXTENDED RELEASE ORAL at 17:29

## 2019-01-01 RX ADMIN — LEVOFLOXACIN 750 MG: 5 INJECTION, SOLUTION INTRAVENOUS at 06:32

## 2019-01-01 RX ADMIN — IPRATROPIUM BROMIDE AND ALBUTEROL SULFATE 1 AMPULE: .5; 3 SOLUTION RESPIRATORY (INHALATION) at 21:51

## 2019-01-01 RX ADMIN — FERROUS SULFATE TAB 325 MG (65 MG ELEMENTAL FE) 325 MG: 325 (65 FE) TAB at 08:18

## 2019-01-01 RX ADMIN — GUAIFENESIN 600 MG: 600 TABLET, EXTENDED RELEASE ORAL at 09:39

## 2019-01-01 RX ADMIN — DULOXETINE HYDROCHLORIDE 20 MG: 20 CAPSULE, DELAYED RELEASE ORAL at 08:44

## 2019-01-01 RX ADMIN — MINERAL OIL AND WHITE PETROLATUM: 150; 830 OINTMENT OPHTHALMIC at 09:52

## 2019-01-01 RX ADMIN — OXYCODONE HYDROCHLORIDE AND ACETAMINOPHEN 500 MG: 500 TABLET ORAL at 08:42

## 2019-01-01 RX ADMIN — NIFEDIPINE 60 MG: 30 TABLET, FILM COATED, EXTENDED RELEASE ORAL at 09:45

## 2019-01-01 RX ADMIN — Medication 10 ML: at 21:29

## 2019-01-01 RX ADMIN — MINERAL OIL AND WHITE PETROLATUM: 150; 830 OINTMENT OPHTHALMIC at 05:57

## 2019-01-01 RX ADMIN — FLUTICASONE PROPIONATE 1 SPRAY: 50 SPRAY, METERED NASAL at 09:25

## 2019-01-01 RX ADMIN — FERROUS SULFATE TAB EC 325 MG (65 MG FE EQUIVALENT) 325 MG: 325 (65 FE) TABLET DELAYED RESPONSE at 16:34

## 2019-01-01 RX ADMIN — DEXTROSE 15 G: 15 GEL ORAL at 11:48

## 2019-01-01 RX ADMIN — LORAZEPAM 0.5 MG: 2 INJECTION INTRAMUSCULAR; INTRAVENOUS at 10:42

## 2019-01-01 RX ADMIN — TRAMADOL HYDROCHLORIDE 50 MG: 50 TABLET, FILM COATED ORAL at 09:24

## 2019-01-01 RX ADMIN — ATROPINE SULFATE 2 DROP: 10 SOLUTION OPHTHALMIC at 12:00

## 2019-01-01 RX ADMIN — MORPHINE SULFATE 2 MG: 2 INJECTION, SOLUTION INTRAMUSCULAR; INTRAVENOUS at 01:38

## 2019-01-01 RX ADMIN — CARVEDILOL 12.5 MG: 12.5 TABLET, FILM COATED ORAL at 09:06

## 2019-01-01 RX ADMIN — ISOSORBIDE MONONITRATE 60 MG: 30 TABLET, EXTENDED RELEASE ORAL at 13:14

## 2019-01-01 RX ADMIN — ATROPINE SULFATE 2 DROP: 10 SOLUTION/ DROPS OPHTHALMIC at 21:35

## 2019-01-01 RX ADMIN — HYDRALAZINE HYDROCHLORIDE 100 MG: 25 TABLET, FILM COATED ORAL at 22:45

## 2019-01-01 RX ADMIN — HYDRALAZINE HYDROCHLORIDE 100 MG: 25 TABLET, FILM COATED ORAL at 22:48

## 2019-01-01 RX ADMIN — ATROPINE SULFATE 2 DROP: 10 SOLUTION OPHTHALMIC at 11:07

## 2019-01-01 RX ADMIN — HYDRALAZINE HYDROCHLORIDE 100 MG: 25 TABLET, FILM COATED ORAL at 05:09

## 2019-01-01 RX ADMIN — CARVEDILOL 12.5 MG: 12.5 TABLET, FILM COATED ORAL at 09:29

## 2019-01-01 RX ADMIN — FERROUS SULFATE TAB 325 MG (65 MG ELEMENTAL FE) 325 MG: 325 (65 FE) TAB at 08:45

## 2019-01-01 RX ADMIN — ASPIRIN 325 MG: 325 TABLET, DELAYED RELEASE ORAL at 19:33

## 2019-01-01 RX ADMIN — INSULIN LISPRO 1 UNITS: 100 INJECTION, SOLUTION INTRAVENOUS; SUBCUTANEOUS at 11:56

## 2019-01-01 RX ADMIN — Medication 10 ML: at 10:42

## 2019-01-01 RX ADMIN — VANCOMYCIN HYDROCHLORIDE 1500 MG: 1 INJECTION, POWDER, LYOPHILIZED, FOR SOLUTION INTRAVENOUS at 14:48

## 2019-01-01 RX ADMIN — GUAIFENESIN 600 MG: 600 TABLET, EXTENDED RELEASE ORAL at 21:21

## 2019-01-01 RX ADMIN — MORPHINE SULFATE 1 MG: 2 INJECTION, SOLUTION INTRAMUSCULAR; INTRAVENOUS at 02:50

## 2019-01-01 RX ADMIN — NIFEDIPINE 60 MG: 30 TABLET, FILM COATED, EXTENDED RELEASE ORAL at 08:45

## 2019-01-01 RX ADMIN — DULOXETINE HYDROCHLORIDE 20 MG: 20 CAPSULE, DELAYED RELEASE ORAL at 08:17

## 2019-01-01 RX ADMIN — OXYCODONE HYDROCHLORIDE AND ACETAMINOPHEN 500 MG: 500 TABLET ORAL at 08:44

## 2019-01-01 RX ADMIN — NIFEDIPINE 60 MG: 30 TABLET, FILM COATED, EXTENDED RELEASE ORAL at 09:06

## 2019-01-01 RX ADMIN — Medication 0.2 MG: at 00:24

## 2019-01-01 RX ADMIN — FERROUS SULFATE TAB 325 MG (65 MG ELEMENTAL FE) 325 MG: 325 (65 FE) TAB at 08:34

## 2019-01-01 RX ADMIN — ACETAMINOPHEN 650 MG: 325 TABLET, FILM COATED ORAL at 10:00

## 2019-01-01 RX ADMIN — ISOSORBIDE MONONITRATE 60 MG: 30 TABLET, EXTENDED RELEASE ORAL at 17:07

## 2019-01-01 RX ADMIN — BUMETANIDE 2 MG: 1 TABLET ORAL at 09:06

## 2019-01-01 RX ADMIN — FAMOTIDINE 20 MG: 20 TABLET ORAL at 08:45

## 2019-01-01 RX ADMIN — ISOSORBIDE MONONITRATE 60 MG: 30 TABLET, EXTENDED RELEASE ORAL at 08:44

## 2019-01-01 RX ADMIN — SODIUM BICARBONATE 1300 MG: 650 TABLET ORAL at 09:29

## 2019-01-01 RX ADMIN — ISOSORBIDE MONONITRATE 60 MG: 60 TABLET ORAL at 21:56

## 2019-01-01 RX ADMIN — ASPIRIN 81 MG: 81 TABLET, COATED ORAL at 08:45

## 2019-01-01 RX ADMIN — ISOSORBIDE MONONITRATE 60 MG: 30 TABLET, EXTENDED RELEASE ORAL at 07:44

## 2019-01-01 RX ADMIN — SODIUM BICARBONATE 1300 MG: 650 TABLET ORAL at 22:14

## 2019-01-01 RX ADMIN — ZOLPIDEM TARTRATE 5 MG: 5 TABLET ORAL at 22:05

## 2019-01-01 RX ADMIN — LORAZEPAM 1 MG: 2 INJECTION INTRAMUSCULAR; INTRAVENOUS at 10:21

## 2019-01-01 RX ADMIN — Medication 10 ML: at 21:05

## 2019-01-01 RX ADMIN — BUMETANIDE 1 MG: 1 TABLET ORAL at 09:37

## 2019-01-01 RX ADMIN — HYDRALAZINE HYDROCHLORIDE 100 MG: 25 TABLET, FILM COATED ORAL at 14:35

## 2019-01-01 RX ADMIN — INSULIN LISPRO 50 UNITS: 100 INJECTION, SUSPENSION SUBCUTANEOUS at 09:41

## 2019-01-01 RX ADMIN — IPRATROPIUM BROMIDE AND ALBUTEROL SULFATE 1 AMPULE: .5; 3 SOLUTION RESPIRATORY (INHALATION) at 05:11

## 2019-01-01 RX ADMIN — MORPHINE SULFATE 10 MG: 100 SOLUTION ORAL at 20:27

## 2019-01-01 RX ADMIN — DULOXETINE HYDROCHLORIDE 20 MG: 20 CAPSULE, DELAYED RELEASE ORAL at 08:22

## 2019-01-01 RX ADMIN — CARVEDILOL 12.5 MG: 12.5 TABLET, FILM COATED ORAL at 08:45

## 2019-01-01 RX ADMIN — FUROSEMIDE 40 MG: 10 INJECTION, SOLUTION INTRAMUSCULAR; INTRAVENOUS at 22:37

## 2019-01-01 RX ADMIN — SPIRONOLACTONE 25 MG: 25 TABLET, FILM COATED ORAL at 08:45

## 2019-01-01 RX ADMIN — PANTOPRAZOLE SODIUM 40 MG: 40 TABLET, DELAYED RELEASE ORAL at 05:22

## 2019-01-01 RX ADMIN — OXYCODONE HYDROCHLORIDE AND ACETAMINOPHEN 500 MG: 500 TABLET ORAL at 08:38

## 2019-01-01 RX ADMIN — NIFEDIPINE 60 MG: 30 TABLET, FILM COATED, EXTENDED RELEASE ORAL at 21:42

## 2019-01-01 RX ADMIN — DULOXETINE HYDROCHLORIDE 20 MG: 20 CAPSULE, DELAYED RELEASE ORAL at 09:38

## 2019-01-01 RX ADMIN — OXYCODONE HYDROCHLORIDE AND ACETAMINOPHEN 500 MG: 500 TABLET ORAL at 09:39

## 2019-01-01 RX ADMIN — PANTOPRAZOLE SODIUM 40 MG: 40 TABLET, DELAYED RELEASE ORAL at 05:26

## 2019-01-01 RX ADMIN — METHYLPREDNISOLONE SODIUM SUCCINATE 40 MG: 40 INJECTION, POWDER, FOR SOLUTION INTRAMUSCULAR; INTRAVENOUS at 17:05

## 2019-01-01 RX ADMIN — FAMOTIDINE 20 MG: 20 TABLET ORAL at 09:39

## 2019-01-01 RX ADMIN — BUMETANIDE 1 MG: 1 TABLET ORAL at 09:26

## 2019-01-01 RX ADMIN — NIFEDIPINE 60 MG: 30 TABLET, FILM COATED, EXTENDED RELEASE ORAL at 09:05

## 2019-01-01 RX ADMIN — HYDRALAZINE HYDROCHLORIDE 100 MG: 25 TABLET, FILM COATED ORAL at 14:44

## 2019-01-01 RX ADMIN — FAMOTIDINE 20 MG: 20 TABLET ORAL at 08:38

## 2019-01-01 RX ADMIN — HYDRALAZINE HYDROCHLORIDE 100 MG: 50 TABLET, FILM COATED ORAL at 14:21

## 2019-01-01 RX ADMIN — ATROPINE SULFATE 2 DROP: 10 SOLUTION/ DROPS OPHTHALMIC at 11:43

## 2019-01-01 RX ADMIN — NIFEDIPINE 60 MG: 30 TABLET, FILM COATED, EXTENDED RELEASE ORAL at 22:14

## 2019-01-01 RX ADMIN — GUAIFENESIN 600 MG: 600 TABLET, EXTENDED RELEASE ORAL at 08:44

## 2019-01-01 RX ADMIN — METFORMIN HYDROCHLORIDE 500 MG: 500 TABLET ORAL at 10:29

## 2019-01-01 RX ADMIN — TORSEMIDE 20 MG: 10 TABLET ORAL at 09:25

## 2019-01-01 RX ADMIN — ASPIRIN 325 MG: 325 TABLET, DELAYED RELEASE ORAL at 09:29

## 2019-01-01 RX ADMIN — ISOSORBIDE MONONITRATE 60 MG: 60 TABLET ORAL at 09:29

## 2019-01-01 RX ADMIN — ATROPINE SULFATE 2 DROP: 10 SOLUTION OPHTHALMIC at 16:23

## 2019-01-01 RX ADMIN — NIFEDIPINE 60 MG: 30 TABLET, FILM COATED, EXTENDED RELEASE ORAL at 08:30

## 2019-01-01 RX ADMIN — IPRATROPIUM BROMIDE AND ALBUTEROL SULFATE 1 AMPULE: .5; 3 SOLUTION RESPIRATORY (INHALATION) at 08:51

## 2019-01-01 RX ADMIN — FERROUS SULFATE TAB 325 MG (65 MG ELEMENTAL FE) 325 MG: 325 (65 FE) TAB at 17:04

## 2019-01-01 RX ADMIN — HEPARIN SODIUM 5000 UNITS: 5000 INJECTION INTRAVENOUS; SUBCUTANEOUS at 08:44

## 2019-01-01 RX ADMIN — MORPHINE SULFATE 2 MG: 2 INJECTION, SOLUTION INTRAMUSCULAR; INTRAVENOUS at 06:25

## 2019-01-01 RX ADMIN — CARVEDILOL 25 MG: 25 TABLET, FILM COATED ORAL at 09:43

## 2019-01-01 RX ADMIN — NIFEDIPINE 60 MG: 30 TABLET, FILM COATED, EXTENDED RELEASE ORAL at 21:09

## 2019-01-01 RX ADMIN — LORAZEPAM 1 MG: 2 INJECTION INTRAMUSCULAR; INTRAVENOUS at 17:54

## 2019-01-01 RX ADMIN — NIFEDIPINE 60 MG: 30 TABLET, FILM COATED, EXTENDED RELEASE ORAL at 20:31

## 2019-01-01 RX ADMIN — LEVOFLOXACIN 750 MG: 5 INJECTION, SOLUTION INTRAVENOUS at 05:48

## 2019-01-01 RX ADMIN — OXYCODONE HYDROCHLORIDE AND ACETAMINOPHEN 500 MG: 500 TABLET ORAL at 07:44

## 2019-01-01 RX ADMIN — HEPARIN SODIUM 5000 UNITS: 5000 INJECTION INTRAVENOUS; SUBCUTANEOUS at 08:30

## 2019-01-01 RX ADMIN — INSULIN LISPRO 1 UNITS: 100 INJECTION, SOLUTION INTRAVENOUS; SUBCUTANEOUS at 08:05

## 2019-01-01 RX ADMIN — FLUTICASONE PROPIONATE 1 SPRAY: 50 SPRAY, METERED NASAL at 08:19

## 2019-01-01 RX ADMIN — Medication 10 ML: at 10:22

## 2019-01-01 RX ADMIN — DULOXETINE HYDROCHLORIDE 20 MG: 20 CAPSULE, DELAYED RELEASE ORAL at 09:07

## 2019-01-01 RX ADMIN — LORAZEPAM 1 MG: 2 INJECTION INTRAMUSCULAR; INTRAVENOUS at 01:06

## 2019-01-01 RX ADMIN — DEXTROSE MONOHYDRATE 100 ML/HR: 50 INJECTION, SOLUTION INTRAVENOUS at 14:47

## 2019-01-01 RX ADMIN — INSULIN LISPRO 40 UNITS: 100 INJECTION, SUSPENSION SUBCUTANEOUS at 22:12

## 2019-01-01 RX ADMIN — FERROUS SULFATE TAB 325 MG (65 MG ELEMENTAL FE) 325 MG: 325 (65 FE) TAB at 08:47

## 2019-01-01 RX ADMIN — PANTOPRAZOLE SODIUM 40 MG: 40 TABLET, DELAYED RELEASE ORAL at 06:47

## 2019-01-01 RX ADMIN — INSULIN LISPRO 2 UNITS: 100 INJECTION, SOLUTION INTRAVENOUS; SUBCUTANEOUS at 17:02

## 2019-01-01 RX ADMIN — FERROUS SULFATE TAB 325 MG (65 MG ELEMENTAL FE) 325 MG: 325 (65 FE) TAB at 08:41

## 2019-01-01 RX ADMIN — SPIRONOLACTONE 25 MG: 25 TABLET, FILM COATED ORAL at 08:38

## 2019-01-01 RX ADMIN — MORPHINE SULFATE 2 MG: 2 INJECTION, SOLUTION INTRAMUSCULAR; INTRAVENOUS at 22:54

## 2019-01-01 RX ADMIN — Medication 10 ML: at 21:53

## 2019-01-01 RX ADMIN — VANCOMYCIN HYDROCHLORIDE 1750 MG: 1 INJECTION, POWDER, LYOPHILIZED, FOR SOLUTION INTRAVENOUS at 16:36

## 2019-01-01 RX ADMIN — Medication 25 UNITS: at 17:03

## 2019-01-01 RX ADMIN — CARVEDILOL 12.5 MG: 12.5 TABLET, FILM COATED ORAL at 08:46

## 2019-01-01 RX ADMIN — LORAZEPAM 1 MG: 2 INJECTION INTRAMUSCULAR; INTRAVENOUS at 01:03

## 2019-01-01 RX ADMIN — FERROUS SULFATE TAB EC 325 MG (65 MG FE EQUIVALENT) 325 MG: 325 (65 FE) TABLET DELAYED RESPONSE at 09:42

## 2019-01-01 RX ADMIN — Medication 10 ML: at 21:57

## 2019-01-01 RX ADMIN — HYDRALAZINE HYDROCHLORIDE 100 MG: 25 TABLET, FILM COATED ORAL at 06:32

## 2019-01-01 RX ADMIN — IPRATROPIUM BROMIDE AND ALBUTEROL SULFATE 1 AMPULE: .5; 3 SOLUTION RESPIRATORY (INHALATION) at 21:44

## 2019-01-01 RX ADMIN — SPIRONOLACTONE 25 MG: 25 TABLET, FILM COATED ORAL at 07:44

## 2019-01-01 RX ADMIN — MINERAL OIL AND WHITE PETROLATUM: 150; 830 OINTMENT OPHTHALMIC at 11:09

## 2019-01-01 RX ADMIN — ZOLPIDEM TARTRATE 5 MG: 5 TABLET ORAL at 22:45

## 2019-01-01 RX ADMIN — FUROSEMIDE 80 MG: 10 INJECTION, SOLUTION INTRAMUSCULAR; INTRAVENOUS at 23:18

## 2019-01-01 RX ADMIN — ATROPINE SULFATE 2 DROP: 10 SOLUTION OPHTHALMIC at 06:06

## 2019-01-01 RX ADMIN — Medication 10 ML: at 12:09

## 2019-01-01 RX ADMIN — NIFEDIPINE 60 MG: 30 TABLET, FILM COATED, EXTENDED RELEASE ORAL at 22:10

## 2019-01-01 RX ADMIN — FUROSEMIDE 20 MG: 10 INJECTION, SOLUTION INTRAMUSCULAR; INTRAVENOUS at 17:55

## 2019-01-01 RX ADMIN — Medication 10 ML: at 21:00

## 2019-01-01 RX ADMIN — HEPARIN SODIUM 5000 UNITS: 5000 INJECTION INTRAVENOUS; SUBCUTANEOUS at 09:45

## 2019-01-01 RX ADMIN — ASPIRIN 81 MG: 81 TABLET, COATED ORAL at 08:19

## 2019-01-01 RX ADMIN — HEPARIN SODIUM 5000 UNITS: 5000 INJECTION INTRAVENOUS; SUBCUTANEOUS at 20:43

## 2019-01-01 RX ADMIN — INSULIN LISPRO 2 UNITS: 100 INJECTION, SOLUTION INTRAVENOUS; SUBCUTANEOUS at 07:45

## 2019-01-01 RX ADMIN — Medication 10 ML: at 08:17

## 2019-01-01 RX ADMIN — ZOLPIDEM TARTRATE 5 MG: 5 TABLET ORAL at 22:10

## 2019-01-01 RX ADMIN — CARVEDILOL 12.5 MG: 12.5 TABLET, FILM COATED ORAL at 22:15

## 2019-01-01 RX ADMIN — LOSARTAN POTASSIUM 100 MG: 100 TABLET, FILM COATED ORAL at 09:43

## 2019-01-01 RX ADMIN — HYDRALAZINE HYDROCHLORIDE 100 MG: 25 TABLET, FILM COATED ORAL at 14:50

## 2019-01-01 RX ADMIN — Medication 10 ML: at 21:16

## 2019-01-01 RX ADMIN — IPRATROPIUM BROMIDE AND ALBUTEROL SULFATE 1 AMPULE: .5; 3 SOLUTION RESPIRATORY (INHALATION) at 17:37

## 2019-01-01 RX ADMIN — LORAZEPAM 1 MG: 2 INJECTION INTRAMUSCULAR; INTRAVENOUS at 13:24

## 2019-01-01 RX ADMIN — SODIUM BICARBONATE 650 MG: 650 TABLET ORAL at 21:57

## 2019-01-01 RX ADMIN — GUAIFENESIN 600 MG: 600 TABLET, EXTENDED RELEASE ORAL at 09:07

## 2019-01-01 RX ADMIN — SODIUM BICARBONATE 650 MG TABLET 650 MG: at 08:44

## 2019-01-01 RX ADMIN — FLUTICASONE PROPIONATE 1 SPRAY: 50 SPRAY, METERED NASAL at 08:47

## 2019-01-01 RX ADMIN — NIFEDIPINE 60 MG: 30 TABLET, FILM COATED, EXTENDED RELEASE ORAL at 08:50

## 2019-01-01 RX ADMIN — FAMOTIDINE 20 MG: 20 TABLET ORAL at 08:20

## 2019-01-01 RX ADMIN — TORSEMIDE 20 MG: 10 TABLET ORAL at 08:15

## 2019-01-01 RX ADMIN — ZOLPIDEM TARTRATE 5 MG: 5 TABLET ORAL at 22:38

## 2019-01-01 RX ADMIN — CARVEDILOL 12.5 MG: 12.5 TABLET, FILM COATED ORAL at 08:37

## 2019-01-01 RX ADMIN — MINERAL OIL AND WHITE PETROLATUM: 150; 830 OINTMENT OPHTHALMIC at 04:52

## 2019-01-01 RX ADMIN — MINERAL OIL AND WHITE PETROLATUM: 150; 830 OINTMENT OPHTHALMIC at 21:04

## 2019-01-01 RX ADMIN — MORPHINE SULFATE 1 MG: 2 INJECTION, SOLUTION INTRAMUSCULAR; INTRAVENOUS at 06:29

## 2019-01-01 RX ADMIN — INSULIN LISPRO 1 UNITS: 100 INJECTION, SOLUTION INTRAVENOUS; SUBCUTANEOUS at 20:33

## 2019-01-01 RX ADMIN — MORPHINE SULFATE 1 MG: 2 INJECTION, SOLUTION INTRAMUSCULAR; INTRAVENOUS at 22:39

## 2019-01-01 RX ADMIN — INSULIN LISPRO 3 UNITS: 100 INJECTION, SOLUTION INTRAVENOUS; SUBCUTANEOUS at 16:40

## 2019-01-01 RX ADMIN — Medication 10 ML: at 15:37

## 2019-01-01 RX ADMIN — FUROSEMIDE 40 MG: 10 INJECTION, SOLUTION INTRAMUSCULAR; INTRAVENOUS at 02:15

## 2019-01-01 RX ADMIN — IPRATROPIUM BROMIDE AND ALBUTEROL SULFATE 1 AMPULE: .5; 3 SOLUTION RESPIRATORY (INHALATION) at 21:14

## 2019-01-01 RX ADMIN — SIMVASTATIN 40 MG: 40 TABLET, FILM COATED ORAL at 21:57

## 2019-01-01 RX ADMIN — INSULIN LISPRO 2 UNITS: 100 INJECTION, SOLUTION INTRAVENOUS; SUBCUTANEOUS at 20:31

## 2019-01-01 RX ADMIN — NIFEDIPINE 60 MG: 30 TABLET, FILM COATED, EXTENDED RELEASE ORAL at 08:23

## 2019-01-01 RX ADMIN — BUMETANIDE 2 MG: 1 TABLET ORAL at 17:29

## 2019-01-01 RX ADMIN — GUAIFENESIN 600 MG: 600 TABLET, EXTENDED RELEASE ORAL at 08:22

## 2019-01-01 RX ADMIN — HYDRALAZINE HYDROCHLORIDE 100 MG: 50 TABLET, FILM COATED ORAL at 17:55

## 2019-01-01 RX ADMIN — DULOXETINE HYDROCHLORIDE 20 MG: 20 CAPSULE, DELAYED RELEASE ORAL at 09:06

## 2019-01-01 RX ADMIN — ATROPINE SULFATE 2 DROP: 10 SOLUTION/ DROPS OPHTHALMIC at 10:20

## 2019-01-01 RX ADMIN — SODIUM BICARBONATE 650 MG TABLET 650 MG: at 20:47

## 2019-01-01 RX ADMIN — MORPHINE SULFATE 10 MG: 100 SOLUTION ORAL at 08:46

## 2019-01-01 RX ADMIN — HYDRALAZINE HYDROCHLORIDE 100 MG: 25 TABLET, FILM COATED ORAL at 05:55

## 2019-01-01 RX ADMIN — IPRATROPIUM BROMIDE AND ALBUTEROL SULFATE 1 AMPULE: .5; 3 SOLUTION RESPIRATORY (INHALATION) at 05:54

## 2019-01-01 RX ADMIN — IPRATROPIUM BROMIDE AND ALBUTEROL SULFATE 1 AMPULE: .5; 3 SOLUTION RESPIRATORY (INHALATION) at 05:21

## 2019-01-01 RX ADMIN — ATROPINE SULFATE 2 DROP: 10 SOLUTION/ DROPS OPHTHALMIC at 04:53

## 2019-01-01 RX ADMIN — SPIRONOLACTONE 25 MG: 25 TABLET, FILM COATED ORAL at 08:20

## 2019-01-01 RX ADMIN — FAMOTIDINE 20 MG: 20 TABLET ORAL at 07:58

## 2019-01-01 RX ADMIN — SPIRONOLACTONE 25 MG: 25 TABLET, FILM COATED ORAL at 08:22

## 2019-01-01 RX ADMIN — ACETYLCYSTEINE 200 MG: 200 SOLUTION ORAL; RESPIRATORY (INHALATION) at 05:09

## 2019-01-01 RX ADMIN — SODIUM BICARBONATE 650 MG TABLET 650 MG: at 20:48

## 2019-01-01 RX ADMIN — Medication 10 ML: at 08:48

## 2019-01-01 RX ADMIN — GUAIFENESIN 600 MG: 600 TABLET, EXTENDED RELEASE ORAL at 20:45

## 2019-01-01 RX ADMIN — LORAZEPAM 0.5 MG: 0.5 TABLET ORAL at 00:11

## 2019-01-01 RX ADMIN — NIFEDIPINE 60 MG: 30 TABLET, FILM COATED, EXTENDED RELEASE ORAL at 07:58

## 2019-01-01 RX ADMIN — GUAIFENESIN 600 MG: 600 TABLET, EXTENDED RELEASE ORAL at 21:07

## 2019-01-01 RX ADMIN — GUAIFENESIN 600 MG: 600 TABLET, EXTENDED RELEASE ORAL at 20:30

## 2019-01-01 RX ADMIN — CLOPIDOGREL BISULFATE 75 MG: 75 TABLET ORAL at 09:26

## 2019-01-01 RX ADMIN — CARVEDILOL 12.5 MG: 12.5 TABLET, FILM COATED ORAL at 08:34

## 2019-01-01 RX ADMIN — Medication 10 ML: at 13:11

## 2019-01-01 RX ADMIN — MINERAL OIL AND WHITE PETROLATUM: 150; 830 OINTMENT OPHTHALMIC at 10:53

## 2019-01-01 RX ADMIN — IPRATROPIUM BROMIDE AND ALBUTEROL SULFATE 1 AMPULE: .5; 3 SOLUTION RESPIRATORY (INHALATION) at 13:43

## 2019-01-01 RX ADMIN — BUMETANIDE 2 MG: 0.25 INJECTION INTRAMUSCULAR; INTRAVENOUS at 17:33

## 2019-01-01 RX ADMIN — Medication 10 ML: at 16:30

## 2019-01-01 RX ADMIN — INSULIN LISPRO 40 UNITS: 100 INJECTION, SUSPENSION SUBCUTANEOUS at 21:57

## 2019-01-01 RX ADMIN — ATROPINE SULFATE 2 DROP: 10 SOLUTION OPHTHALMIC at 04:27

## 2019-01-01 RX ADMIN — ASPIRIN 325 MG: 325 TABLET, DELAYED RELEASE ORAL at 09:26

## 2019-01-01 RX ADMIN — IPRATROPIUM BROMIDE AND ALBUTEROL SULFATE 1 AMPULE: .5; 3 SOLUTION RESPIRATORY (INHALATION) at 17:00

## 2019-01-01 RX ADMIN — SODIUM BICARBONATE 650 MG TABLET 650 MG: at 08:42

## 2019-01-01 RX ADMIN — INSULIN LISPRO 2 UNITS: 100 INJECTION, SOLUTION INTRAVENOUS; SUBCUTANEOUS at 22:11

## 2019-01-01 RX ADMIN — Medication 10 UNITS: at 10:14

## 2019-01-01 RX ADMIN — HEPARIN SODIUM 5000 UNITS: 5000 INJECTION INTRAVENOUS; SUBCUTANEOUS at 21:17

## 2019-01-01 RX ADMIN — CLOPIDOGREL BISULFATE 75 MG: 75 TABLET ORAL at 17:55

## 2019-01-01 RX ADMIN — INSULIN LISPRO 2 UNITS: 100 INJECTION, SOLUTION INTRAVENOUS; SUBCUTANEOUS at 17:15

## 2019-01-01 RX ADMIN — MINERAL OIL AND WHITE PETROLATUM: 150; 830 OINTMENT OPHTHALMIC at 13:07

## 2019-01-01 RX ADMIN — BUMETANIDE 2 MG: 0.25 INJECTION INTRAMUSCULAR; INTRAVENOUS at 09:06

## 2019-01-01 RX ADMIN — INSULIN LISPRO 2 UNITS: 100 INJECTION, SOLUTION INTRAVENOUS; SUBCUTANEOUS at 20:44

## 2019-01-01 RX ADMIN — GUAIFENESIN 600 MG: 600 TABLET, EXTENDED RELEASE ORAL at 21:56

## 2019-01-01 RX ADMIN — Medication 500 MG: at 09:43

## 2019-01-01 RX ADMIN — HYDRALAZINE HYDROCHLORIDE 100 MG: 25 TABLET, FILM COATED ORAL at 13:53

## 2019-01-01 RX ADMIN — BUMETANIDE 2 MG: 1 TABLET ORAL at 08:41

## 2019-01-01 RX ADMIN — SODIUM BICARBONATE 650 MG TABLET 650 MG: at 08:18

## 2019-01-01 RX ADMIN — CARVEDILOL 12.5 MG: 12.5 TABLET, FILM COATED ORAL at 21:09

## 2019-01-01 RX ADMIN — Medication 500 MG: at 09:26

## 2019-01-01 RX ADMIN — LORAZEPAM 1 MG: 2 INJECTION INTRAMUSCULAR; INTRAVENOUS at 17:50

## 2019-01-01 RX ADMIN — FERROUS SULFATE TAB 325 MG (65 MG ELEMENTAL FE) 325 MG: 325 (65 FE) TAB at 08:37

## 2019-01-01 RX ADMIN — INSULIN LISPRO 2 UNITS: 100 INJECTION, SOLUTION INTRAVENOUS; SUBCUTANEOUS at 21:06

## 2019-01-01 RX ADMIN — Medication 10 ML: at 05:48

## 2019-01-01 RX ADMIN — ATROPINE SULFATE 2 DROP: 10 SOLUTION/ DROPS OPHTHALMIC at 16:56

## 2019-01-01 RX ADMIN — MORPHINE SULFATE 2 MG: 2 INJECTION, SOLUTION INTRAMUSCULAR; INTRAVENOUS at 11:40

## 2019-01-01 RX ADMIN — MORPHINE SULFATE 2 MG: 2 INJECTION, SOLUTION INTRAMUSCULAR; INTRAVENOUS at 06:11

## 2019-01-01 RX ADMIN — CARVEDILOL 12.5 MG: 12.5 TABLET, FILM COATED ORAL at 09:07

## 2019-01-01 RX ADMIN — INSULIN LISPRO 3 UNITS: 100 INJECTION, SOLUTION INTRAVENOUS; SUBCUTANEOUS at 17:33

## 2019-01-01 RX ADMIN — ISOSORBIDE MONONITRATE 60 MG: 30 TABLET, EXTENDED RELEASE ORAL at 08:34

## 2019-01-01 RX ADMIN — IPRATROPIUM BROMIDE AND ALBUTEROL SULFATE 1 AMPULE: .5; 3 SOLUTION RESPIRATORY (INHALATION) at 09:24

## 2019-01-01 RX ADMIN — ISOSORBIDE MONONITRATE 60 MG: 30 TABLET, EXTENDED RELEASE ORAL at 08:17

## 2019-01-01 RX ADMIN — MINERAL OIL AND WHITE PETROLATUM: 150; 830 OINTMENT OPHTHALMIC at 09:15

## 2019-01-01 RX ADMIN — HYDRALAZINE HYDROCHLORIDE 100 MG: 25 TABLET, FILM COATED ORAL at 06:07

## 2019-01-01 RX ADMIN — ATROPINE SULFATE 2 DROP: 10 SOLUTION OPHTHALMIC at 01:51

## 2019-01-01 RX ADMIN — ATROPINE SULFATE 2 DROP: 10 SOLUTION/ DROPS OPHTHALMIC at 18:00

## 2019-01-01 RX ADMIN — OXYCODONE HYDROCHLORIDE AND ACETAMINOPHEN 500 MG: 500 TABLET ORAL at 08:45

## 2019-01-01 RX ADMIN — Medication 10 ML: at 09:40

## 2019-01-01 RX ADMIN — FLUTICASONE PROPIONATE 1 SPRAY: 50 SPRAY, METERED NASAL at 08:49

## 2019-01-01 RX ADMIN — Medication 0.2 MG: at 06:23

## 2019-01-01 RX ADMIN — HYDRALAZINE HYDROCHLORIDE 100 MG: 25 TABLET, FILM COATED ORAL at 21:25

## 2019-01-01 RX ADMIN — INSULIN LISPRO 2 UNITS: 100 INJECTION, SOLUTION INTRAVENOUS; SUBCUTANEOUS at 21:26

## 2019-01-01 RX ADMIN — SIMVASTATIN 40 MG: 20 TABLET, FILM COATED ORAL at 17:29

## 2019-01-01 RX ADMIN — BUMETANIDE 1 MG: 1 TABLET ORAL at 09:42

## 2019-01-01 RX ADMIN — MINERAL OIL AND WHITE PETROLATUM: 150; 830 OINTMENT OPHTHALMIC at 15:16

## 2019-01-01 RX ADMIN — SIMVASTATIN 40 MG: 40 TABLET, FILM COATED ORAL at 22:15

## 2019-01-01 RX ADMIN — CARVEDILOL 12.5 MG: 12.5 TABLET, FILM COATED ORAL at 08:42

## 2019-01-01 RX ADMIN — IPRATROPIUM BROMIDE AND ALBUTEROL SULFATE 1 AMPULE: .5; 3 SOLUTION RESPIRATORY (INHALATION) at 18:35

## 2019-01-01 RX ADMIN — INSULIN LISPRO 3 UNITS: 100 INJECTION, SOLUTION INTRAVENOUS; SUBCUTANEOUS at 08:19

## 2019-01-01 RX ADMIN — CARVEDILOL 12.5 MG: 12.5 TABLET, FILM COATED ORAL at 01:13

## 2019-01-01 RX ADMIN — ACETAMINOPHEN 650 MG: 325 TABLET, FILM COATED ORAL at 19:45

## 2019-01-01 RX ADMIN — Medication 10 ML: at 09:42

## 2019-01-01 RX ADMIN — IPRATROPIUM BROMIDE AND ALBUTEROL SULFATE 1 AMPULE: .5; 3 SOLUTION RESPIRATORY (INHALATION) at 13:25

## 2019-01-01 RX ADMIN — Medication 10 ML: at 17:10

## 2019-01-01 RX ADMIN — ISOSORBIDE MONONITRATE 30 MG: 30 TABLET, EXTENDED RELEASE ORAL at 08:47

## 2019-01-01 RX ADMIN — SIMVASTATIN 40 MG: 20 TABLET, FILM COATED ORAL at 16:57

## 2019-01-01 RX ADMIN — CLOPIDOGREL BISULFATE 75 MG: 75 TABLET ORAL at 09:37

## 2019-01-01 RX ADMIN — MORPHINE SULFATE 10 MG: 100 SOLUTION ORAL at 01:11

## 2019-01-01 RX ADMIN — IPRATROPIUM BROMIDE AND ALBUTEROL SULFATE 1 AMPULE: .5; 3 SOLUTION RESPIRATORY (INHALATION) at 06:07

## 2019-01-01 RX ADMIN — Medication 10 ML: at 14:44

## 2019-01-01 RX ADMIN — GUAIFENESIN 600 MG: 600 TABLET, EXTENDED RELEASE ORAL at 08:18

## 2019-01-01 RX ADMIN — HYDRALAZINE HYDROCHLORIDE 100 MG: 25 TABLET, FILM COATED ORAL at 13:35

## 2019-01-01 RX ADMIN — ISOSORBIDE MONONITRATE 60 MG: 30 TABLET, EXTENDED RELEASE ORAL at 08:23

## 2019-01-01 RX ADMIN — BUMETANIDE 1 MG: 1 TABLET ORAL at 09:29

## 2019-01-01 RX ADMIN — MINERAL OIL AND WHITE PETROLATUM: 150; 830 OINTMENT OPHTHALMIC at 04:27

## 2019-01-01 RX ADMIN — MORPHINE SULFATE 1 MG: 2 INJECTION, SOLUTION INTRAMUSCULAR; INTRAVENOUS at 08:28

## 2019-01-01 RX ADMIN — MORPHINE SULFATE 2 MG: 2 INJECTION, SOLUTION INTRAMUSCULAR; INTRAVENOUS at 10:42

## 2019-01-01 RX ADMIN — SODIUM BICARBONATE 650 MG TABLET 650 MG: at 21:25

## 2019-01-01 RX ADMIN — Medication 0.2 MG: at 16:11

## 2019-01-01 RX ADMIN — MORPHINE SULFATE 2 MG: 2 INJECTION, SOLUTION INTRAMUSCULAR; INTRAVENOUS at 14:27

## 2019-01-01 RX ADMIN — FERROUS SULFATE TAB 325 MG (65 MG ELEMENTAL FE) 325 MG: 325 (65 FE) TAB at 17:29

## 2019-01-01 RX ADMIN — FAMOTIDINE 20 MG: 20 TABLET ORAL at 07:44

## 2019-01-01 RX ADMIN — Medication 10 ML: at 17:53

## 2019-01-01 RX ADMIN — NIFEDIPINE 60 MG: 30 TABLET, FILM COATED, EXTENDED RELEASE ORAL at 20:43

## 2019-01-01 RX ADMIN — INSULIN LISPRO 1 UNITS: 100 INJECTION, SOLUTION INTRAVENOUS; SUBCUTANEOUS at 08:06

## 2019-01-01 RX ADMIN — FLUTICASONE PROPIONATE 1 SPRAY: 50 SPRAY, METERED NASAL at 09:41

## 2019-01-01 RX ADMIN — ISOSORBIDE MONONITRATE 60 MG: 30 TABLET, EXTENDED RELEASE ORAL at 17:04

## 2019-01-01 RX ADMIN — INSULIN LISPRO 1 UNITS: 100 INJECTION, SOLUTION INTRAVENOUS; SUBCUTANEOUS at 08:01

## 2019-01-01 RX ADMIN — FERROUS SULFATE TAB 325 MG (65 MG ELEMENTAL FE) 325 MG: 325 (65 FE) TAB at 17:15

## 2019-01-01 RX ADMIN — DOBUTAMINE HYDROCHLORIDE 2.5 MCG/KG/MIN: 400 INJECTION INTRAVENOUS at 09:36

## 2019-01-01 RX ADMIN — MORPHINE SULFATE 2 MG: 2 INJECTION, SOLUTION INTRAMUSCULAR; INTRAVENOUS at 10:20

## 2019-01-01 RX ADMIN — Medication 10 ML: at 11:40

## 2019-01-01 RX ADMIN — IPRATROPIUM BROMIDE AND ALBUTEROL SULFATE 1 AMPULE: .5; 3 SOLUTION RESPIRATORY (INHALATION) at 09:00

## 2019-01-01 RX ADMIN — MELATONIN TAB 3 MG 3 MG: 3 TAB at 00:59

## 2019-01-01 RX ADMIN — CLOPIDOGREL BISULFATE 75 MG: 75 TABLET ORAL at 09:29

## 2019-01-01 RX ADMIN — MORPHINE SULFATE 10 MG: 100 SOLUTION ORAL at 18:25

## 2019-01-01 RX ADMIN — ZOLPIDEM TARTRATE 5 MG: 5 TABLET ORAL at 22:22

## 2019-01-01 RX ADMIN — MULTIPLE VITAMINS W/ MINERALS TAB 1 TABLET: TAB at 09:37

## 2019-01-01 RX ADMIN — SODIUM BICARBONATE 650 MG TABLET 650 MG: at 21:15

## 2019-01-01 RX ADMIN — INSULIN LISPRO 2 UNITS: 100 INJECTION, SOLUTION INTRAVENOUS; SUBCUTANEOUS at 17:54

## 2019-01-01 RX ADMIN — ISOSORBIDE MONONITRATE 60 MG: 30 TABLET, EXTENDED RELEASE ORAL at 08:37

## 2019-01-01 RX ADMIN — MORPHINE SULFATE 1 MG: 2 INJECTION, SOLUTION INTRAMUSCULAR; INTRAVENOUS at 20:29

## 2019-01-01 RX ADMIN — HYDRALAZINE HYDROCHLORIDE 100 MG: 50 TABLET, FILM COATED ORAL at 21:08

## 2019-01-01 RX ADMIN — BUMETANIDE 2 MG: 1 TABLET ORAL at 16:57

## 2019-01-01 RX ADMIN — MORPHINE SULFATE 10 MG: 100 SOLUTION ORAL at 04:33

## 2019-01-01 RX ADMIN — SODIUM CHLORIDE: 9 INJECTION, SOLUTION INTRAVENOUS at 01:39

## 2019-01-01 RX ADMIN — Medication 10 ML: at 21:10

## 2019-01-01 RX ADMIN — MORPHINE SULFATE 10 MG: 100 SOLUTION ORAL at 10:58

## 2019-01-01 RX ADMIN — GUAIFENESIN 600 MG: 600 TABLET, EXTENDED RELEASE ORAL at 20:48

## 2019-01-01 RX ADMIN — OXYCODONE HYDROCHLORIDE AND ACETAMINOPHEN 500 MG: 500 TABLET ORAL at 08:19

## 2019-01-01 RX ADMIN — INSULIN LISPRO 2 UNITS: 100 INJECTION, SOLUTION INTRAVENOUS; SUBCUTANEOUS at 11:49

## 2019-01-01 RX ADMIN — INSULIN LISPRO 2 UNITS: 100 INJECTION, SOLUTION INTRAVENOUS; SUBCUTANEOUS at 09:40

## 2019-01-01 RX ADMIN — SIMVASTATIN 40 MG: 20 TABLET, FILM COATED ORAL at 17:15

## 2019-01-01 RX ADMIN — NIFEDIPINE 60 MG: 30 TABLET, FILM COATED, EXTENDED RELEASE ORAL at 21:25

## 2019-01-01 RX ADMIN — ISOSORBIDE MONONITRATE 60 MG: 60 TABLET ORAL at 09:26

## 2019-01-01 RX ADMIN — FAMOTIDINE 20 MG: 20 TABLET ORAL at 08:44

## 2019-01-01 RX ADMIN — LORAZEPAM 1 MG: 2 INJECTION INTRAMUSCULAR; INTRAVENOUS at 13:11

## 2019-01-01 RX ADMIN — HYDRALAZINE HYDROCHLORIDE 100 MG: 25 TABLET, FILM COATED ORAL at 06:03

## 2019-01-01 RX ADMIN — MORPHINE SULFATE 1 MG: 2 INJECTION, SOLUTION INTRAMUSCULAR; INTRAVENOUS at 23:38

## 2019-01-01 RX ADMIN — LORAZEPAM 1 MG: 2 INJECTION INTRAMUSCULAR; INTRAVENOUS at 20:02

## 2019-01-01 RX ADMIN — Medication 10 ML: at 01:08

## 2019-01-01 RX ADMIN — FLUTICASONE PROPIONATE 1 SPRAY: 50 SPRAY, METERED NASAL at 07:17

## 2019-01-01 RX ADMIN — MORPHINE SULFATE 1 MG: 2 INJECTION, SOLUTION INTRAMUSCULAR; INTRAVENOUS at 17:41

## 2019-01-01 RX ADMIN — HYDRALAZINE HYDROCHLORIDE 100 MG: 25 TABLET, FILM COATED ORAL at 14:17

## 2019-01-01 RX ADMIN — Medication 10 ML: at 16:11

## 2019-01-01 RX ADMIN — ZOLPIDEM TARTRATE 5 MG: 5 TABLET ORAL at 02:20

## 2019-01-01 RX ADMIN — MORPHINE SULFATE 10 MG: 100 SOLUTION ORAL at 13:36

## 2019-01-01 RX ADMIN — Medication 10 ML: at 13:24

## 2019-01-01 RX ADMIN — Medication 10 ML: at 21:42

## 2019-01-01 RX ADMIN — FERROUS SULFATE TAB EC 325 MG (65 MG FE EQUIVALENT) 325 MG: 325 (65 FE) TABLET DELAYED RESPONSE at 16:42

## 2019-01-01 RX ADMIN — SODIUM BICARBONATE 650 MG TABLET 650 MG: at 09:07

## 2019-01-01 RX ADMIN — FERROUS SULFATE TAB 325 MG (65 MG ELEMENTAL FE) 325 MG: 325 (65 FE) TAB at 08:23

## 2019-01-01 RX ADMIN — MINERAL OIL AND WHITE PETROLATUM: 150; 830 OINTMENT OPHTHALMIC at 12:28

## 2019-01-01 RX ADMIN — OXYCODONE HYDROCHLORIDE AND ACETAMINOPHEN 500 MG: 500 TABLET ORAL at 08:22

## 2019-01-01 RX ADMIN — LOSARTAN POTASSIUM 100 MG: 100 TABLET, FILM COATED ORAL at 16:43

## 2019-01-01 RX ADMIN — Medication 10 ML: at 08:38

## 2019-01-01 RX ADMIN — IPRATROPIUM BROMIDE AND ALBUTEROL SULFATE 1 AMPULE: .5; 3 SOLUTION RESPIRATORY (INHALATION) at 21:06

## 2019-01-01 RX ADMIN — CARVEDILOL 25 MG: 25 TABLET, FILM COATED ORAL at 20:12

## 2019-01-01 RX ADMIN — Medication 15 UNITS: at 18:48

## 2019-01-01 RX ADMIN — ACETYLCYSTEINE 200 MG: 200 SOLUTION ORAL; RESPIRATORY (INHALATION) at 21:02

## 2019-01-01 RX ADMIN — DULOXETINE HYDROCHLORIDE 20 MG: 20 CAPSULE, DELAYED RELEASE ORAL at 08:34

## 2019-01-01 RX ADMIN — CLOPIDOGREL BISULFATE 75 MG: 75 TABLET ORAL at 09:39

## 2019-01-01 RX ADMIN — Medication 10 ML: at 09:30

## 2019-01-01 RX ADMIN — MORPHINE SULFATE 1 MG: 2 INJECTION, SOLUTION INTRAMUSCULAR; INTRAVENOUS at 08:31

## 2019-01-01 RX ADMIN — ATROPINE SULFATE 2 DROP: 10 SOLUTION OPHTHALMIC at 16:41

## 2019-01-01 RX ADMIN — PANTOPRAZOLE SODIUM 40 MG: 40 TABLET, DELAYED RELEASE ORAL at 08:09

## 2019-01-01 RX ADMIN — SIMVASTATIN 40 MG: 40 TABLET, FILM COATED ORAL at 22:14

## 2019-01-01 RX ADMIN — LORAZEPAM 0.5 MG: 0.5 TABLET ORAL at 18:08

## 2019-01-01 RX ADMIN — CARVEDILOL 12.5 MG: 12.5 TABLET, FILM COATED ORAL at 20:29

## 2019-01-01 RX ADMIN — CARVEDILOL 25 MG: 12.5 TABLET, FILM COATED ORAL at 08:20

## 2019-01-01 RX ADMIN — ISOSORBIDE MONONITRATE 60 MG: 30 TABLET, EXTENDED RELEASE ORAL at 11:58

## 2019-01-01 RX ADMIN — Medication 25 UNITS: at 17:36

## 2019-01-01 RX ADMIN — NIFEDIPINE 60 MG: 30 TABLET, FILM COATED, EXTENDED RELEASE ORAL at 09:39

## 2019-01-01 RX ADMIN — FAMOTIDINE 20 MG: 20 TABLET ORAL at 08:46

## 2019-01-01 RX ADMIN — ASPIRIN 81 MG: 81 TABLET, COATED ORAL at 09:38

## 2019-01-01 RX ADMIN — ACETYLCYSTEINE 200 MG: 200 SOLUTION ORAL; RESPIRATORY (INHALATION) at 13:31

## 2019-01-01 RX ADMIN — ZOLPIDEM TARTRATE 5 MG: 5 TABLET ORAL at 21:21

## 2019-01-01 RX ADMIN — SPIRONOLACTONE 25 MG: 25 TABLET, FILM COATED ORAL at 09:08

## 2019-01-01 RX ADMIN — ACETAMINOPHEN 650 MG: 325 TABLET, FILM COATED ORAL at 13:50

## 2019-01-01 RX ADMIN — IPRATROPIUM BROMIDE AND ALBUTEROL SULFATE 1 AMPULE: .5; 3 SOLUTION RESPIRATORY (INHALATION) at 08:50

## 2019-01-01 RX ADMIN — Medication 10 ML: at 09:14

## 2019-01-01 RX ADMIN — GEMFIBROZIL 600 MG: 600 TABLET ORAL at 09:26

## 2019-01-01 RX ADMIN — SODIUM BICARBONATE 650 MG TABLET 650 MG: at 01:12

## 2019-01-01 RX ADMIN — INSULIN LISPRO 2 UNITS: 100 INJECTION, SOLUTION INTRAVENOUS; SUBCUTANEOUS at 20:40

## 2019-01-01 RX ADMIN — NIFEDIPINE 60 MG: 30 TABLET, FILM COATED, EXTENDED RELEASE ORAL at 21:57

## 2019-01-01 RX ADMIN — SIMVASTATIN 40 MG: 20 TABLET, FILM COATED ORAL at 17:04

## 2019-01-01 RX ADMIN — IPRATROPIUM BROMIDE AND ALBUTEROL SULFATE 1 AMPULE: .5; 3 SOLUTION RESPIRATORY (INHALATION) at 20:50

## 2019-01-01 ASSESSMENT — PAIN SCALES - GENERAL
PAINLEVEL_OUTOF10: 4
PAINLEVEL_OUTOF10: 5
PAINLEVEL_OUTOF10: 0
PAINLEVEL_OUTOF10: 0
PAINLEVEL_OUTOF10: 2
PAINLEVEL_OUTOF10: 0
PAINLEVEL_OUTOF10: 0
PAINLEVEL_OUTOF10: 4
PAINLEVEL_OUTOF10: 0
PAINLEVEL_OUTOF10: 5
PAINLEVEL_OUTOF10: 0
PAINLEVEL_OUTOF10: 4
PAINLEVEL_OUTOF10: 0
PAINLEVEL_OUTOF10: 5
PAINLEVEL_OUTOF10: 4
PAINLEVEL_OUTOF10: 0
PAINLEVEL_OUTOF10: 4
PAINLEVEL_OUTOF10: 3
PAINLEVEL_OUTOF10: 0
PAINLEVEL_OUTOF10: 0
PAINLEVEL_OUTOF10: 4
PAINLEVEL_OUTOF10: 3
PAINLEVEL_OUTOF10: 0
PAINLEVEL_OUTOF10: 4
PAINLEVEL_OUTOF10: 4
PAINLEVEL_OUTOF10: 0
PAINLEVEL_OUTOF10: 3
PAINLEVEL_OUTOF10: 0
PAINLEVEL_OUTOF10: 6
PAINLEVEL_OUTOF10: 0
PAINLEVEL_OUTOF10: 0
PAINLEVEL_OUTOF10: 1
PAINLEVEL_OUTOF10: 0
PAINLEVEL_OUTOF10: 4
PAINLEVEL_OUTOF10: 0
PAINLEVEL_OUTOF10: 4
PAINLEVEL_OUTOF10: 4
PAINLEVEL_OUTOF10: 0
PAINLEVEL_OUTOF10: 5
PAINLEVEL_OUTOF10: 0
PAINLEVEL_OUTOF10: 5
PAINLEVEL_OUTOF10: 3
PAINLEVEL_OUTOF10: 0
PAINLEVEL_OUTOF10: 0
PAINLEVEL_OUTOF10: 4
PAINLEVEL_OUTOF10: 0
PAINLEVEL_OUTOF10: 0
PAINLEVEL_OUTOF10: 6
PAINLEVEL_OUTOF10: 2
PAINLEVEL_OUTOF10: 6
PAINLEVEL_OUTOF10: 4
PAINLEVEL_OUTOF10: 0
PAINLEVEL_OUTOF10: 4
PAINLEVEL_OUTOF10: 0
PAINLEVEL_OUTOF10: 5
PAINLEVEL_OUTOF10: 0
PAINLEVEL_OUTOF10: 3
PAINLEVEL_OUTOF10: 0
PAINLEVEL_OUTOF10: 5
PAINLEVEL_OUTOF10: 0
PAINLEVEL_OUTOF10: 0
PAINLEVEL_OUTOF10: 4
PAINLEVEL_OUTOF10: 0
PAINLEVEL_OUTOF10: 5
PAINLEVEL_OUTOF10: 0
PAINLEVEL_OUTOF10: 5
PAINLEVEL_OUTOF10: 0
PAINLEVEL_OUTOF10: 1
PAINLEVEL_OUTOF10: 0
PAINLEVEL_OUTOF10: 0
PAINLEVEL_OUTOF10: 3
PAINLEVEL_OUTOF10: 4
PAINLEVEL_OUTOF10: 4
PAINLEVEL_OUTOF10: 3
PAINLEVEL_OUTOF10: 0
PAINLEVEL_OUTOF10: 4
PAINLEVEL_OUTOF10: 0
PAINLEVEL_OUTOF10: 0
PAINLEVEL_OUTOF10: 2
PAINLEVEL_OUTOF10: 3
PAINLEVEL_OUTOF10: 4
PAINLEVEL_OUTOF10: 0
PAINLEVEL_OUTOF10: 6
PAINLEVEL_OUTOF10: 4
PAINLEVEL_OUTOF10: 0
PAINLEVEL_OUTOF10: 3
PAINLEVEL_OUTOF10: 0
PAINLEVEL_OUTOF10: 3
PAINLEVEL_OUTOF10: 3
PAINLEVEL_OUTOF10: 4
PAINLEVEL_OUTOF10: 0
PAINLEVEL_OUTOF10: 4
PAINLEVEL_OUTOF10: 5
PAINLEVEL_OUTOF10: 0
PAINLEVEL_OUTOF10: 3
PAINLEVEL_OUTOF10: 3
PAINLEVEL_OUTOF10: 0
PAINLEVEL_OUTOF10: 5
PAINLEVEL_OUTOF10: 0
PAINLEVEL_OUTOF10: 5
PAINLEVEL_OUTOF10: 0
PAINLEVEL_OUTOF10: 0
PAINLEVEL_OUTOF10: 3
PAINLEVEL_OUTOF10: 0
PAINLEVEL_OUTOF10: 3
PAINLEVEL_OUTOF10: 0
PAINLEVEL_OUTOF10: 3
PAINLEVEL_OUTOF10: 0
PAINLEVEL_OUTOF10: 0
PAINLEVEL_OUTOF10: 3
PAINLEVEL_OUTOF10: 2
PAINLEVEL_OUTOF10: 1
PAINLEVEL_OUTOF10: 0
PAINLEVEL_OUTOF10: 4
PAINLEVEL_OUTOF10: 7
PAINLEVEL_OUTOF10: 0
PAINLEVEL_OUTOF10: 0
PAINLEVEL_OUTOF10: 6
PAINLEVEL_OUTOF10: 3
PAINLEVEL_OUTOF10: 0
PAINLEVEL_OUTOF10: 0
PAINLEVEL_OUTOF10: 5
PAINLEVEL_OUTOF10: 0
PAINLEVEL_OUTOF10: 2
PAINLEVEL_OUTOF10: 0
PAINLEVEL_OUTOF10: 5
PAINLEVEL_OUTOF10: 0
PAINLEVEL_OUTOF10: 5
PAINLEVEL_OUTOF10: 0

## 2019-01-01 ASSESSMENT — ENCOUNTER SYMPTOMS
EYE DISCHARGE: 0
VOMITING: 0
DIARRHEA: 0
CHOKING: 0
EYE REDNESS: 0
WHEEZING: 0
WHEEZING: 0
ABDOMINAL PAIN: 0
SHORTNESS OF BREATH: 1
DIARRHEA: 0
SHORTNESS OF BREATH: 1
TROUBLE SWALLOWING: 0
SORE THROAT: 0
ABDOMINAL DISTENTION: 0
TROUBLE SWALLOWING: 0
CONSTIPATION: 0
DIARRHEA: 0
CONSTIPATION: 0
ABDOMINAL DISTENTION: 0
ABDOMINAL PAIN: 0
COUGH: 0
BLOOD IN STOOL: 0
BLOOD IN STOOL: 0
NAUSEA: 0
EYE DISCHARGE: 0
VOICE CHANGE: 0
VOMITING: 0
SHORTNESS OF BREATH: 0
APNEA: 0
VOMITING: 0
BACK PAIN: 0
CHOKING: 0
COUGH: 0
NAUSEA: 0
BLOOD IN STOOL: 0
COUGH: 1
WHEEZING: 0
CONSTIPATION: 0
ABDOMINAL PAIN: 0
NAUSEA: 0
SHORTNESS OF BREATH: 0
NAUSEA: 0
EYE REDNESS: 0
ABDOMINAL PAIN: 0
TROUBLE SWALLOWING: 0

## 2019-01-01 ASSESSMENT — PAIN DESCRIPTION - PROGRESSION

## 2019-01-01 ASSESSMENT — PAIN SCALES - PAIN ASSESSMENT IN ADVANCED DEMENTIA (PAINAD)
CONSOLABILITY: 0
BREATHING: 0
CONSOLABILITY: 0
FACIALEXPRESSION: 0
CONSOLABILITY: 0
BREATHING: 0
NEGVOCALIZATION: 0
BREATHING: 0
CONSOLABILITY: 0
BREATHING: 0
FACIALEXPRESSION: 0
NEGVOCALIZATION: 0
NEGVOCALIZATION: 0
BODYLANGUAGE: 0
BREATHING: 0
FACIALEXPRESSION: 0
FACIALEXPRESSION: 0
NEGVOCALIZATION: 0
NEGVOCALIZATION: 0
BREATHING: 0
BODYLANGUAGE: 0
TOTALSCORE: 0
BODYLANGUAGE: 0
TOTALSCORE: 0
NEGVOCALIZATION: 0
BREATHING: 0
BREATHING: 0
BODYLANGUAGE: 0
FACIALEXPRESSION: 0
FACIALEXPRESSION: 0
CONSOLABILITY: 0
BODYLANGUAGE: 0
TOTALSCORE: 0
TOTALSCORE: 0
CONSOLABILITY: 0
NEGVOCALIZATION: 0
NEGVOCALIZATION: 0
TOTALSCORE: 0
BREATHING: 0
BREATHING: 0
NEGVOCALIZATION: 0
BODYLANGUAGE: 0
BREATHING: 0
BODYLANGUAGE: 0
FACIALEXPRESSION: 0
TOTALSCORE: 0
TOTALSCORE: 0
CONSOLABILITY: 0
BODYLANGUAGE: 0
CONSOLABILITY: 0
TOTALSCORE: 0
TOTALSCORE: 0
CONSOLABILITY: 0
FACIALEXPRESSION: 0
BODYLANGUAGE: 0
BREATHING: 0
BODYLANGUAGE: 0
TOTALSCORE: 0
FACIALEXPRESSION: 0
BODYLANGUAGE: 0
NEGVOCALIZATION: 0
FACIALEXPRESSION: 0
BODYLANGUAGE: 0
BREATHING: 0
NEGVOCALIZATION: 0
NEGVOCALIZATION: 0
BREATHING: 0
BODYLANGUAGE: 0
BODYLANGUAGE: 0
FACIALEXPRESSION: 0
BREATHING: 0
BREATHING: 0
FACIALEXPRESSION: 0
TOTALSCORE: 0
BODYLANGUAGE: 0
BREATHING: 0
TOTALSCORE: 0
TOTALSCORE: 0
NEGVOCALIZATION: 0
CONSOLABILITY: 0
TOTALSCORE: 0
FACIALEXPRESSION: 0
FACIALEXPRESSION: 0
BODYLANGUAGE: 0
TOTALSCORE: 0
FACIALEXPRESSION: 0
BREATHING: 0
FACIALEXPRESSION: 0
BREATHING: 0
FACIALEXPRESSION: 0
BODYLANGUAGE: 0
CONSOLABILITY: 0
TOTALSCORE: 0
TOTALSCORE: 0
BODYLANGUAGE: 0
FACIALEXPRESSION: 0
NEGVOCALIZATION: 0
BODYLANGUAGE: 0
NEGVOCALIZATION: 0
NEGVOCALIZATION: 0
TOTALSCORE: 0
BODYLANGUAGE: 0
CONSOLABILITY: 0
FACIALEXPRESSION: 0
NEGVOCALIZATION: 0
TOTALSCORE: 0
TOTALSCORE: 0
NEGVOCALIZATION: 0
CONSOLABILITY: 0
BREATHING: 0
BREATHING: 0
CONSOLABILITY: 0
BODYLANGUAGE: 0
FACIALEXPRESSION: 0
NEGVOCALIZATION: 0
BODYLANGUAGE: 0
BODYLANGUAGE: 0
FACIALEXPRESSION: 0
BODYLANGUAGE: 0
NEGVOCALIZATION: 0
BODYLANGUAGE: 0
BREATHING: 0
FACIALEXPRESSION: 0
FACIALEXPRESSION: 0
TOTALSCORE: 0
CONSOLABILITY: 0
NEGVOCALIZATION: 0
CONSOLABILITY: 0
CONSOLABILITY: 0
TOTALSCORE: 0
BODYLANGUAGE: 0
BREATHING: 0
BREATHING: 0
FACIALEXPRESSION: 0
FACIALEXPRESSION: 0
BODYLANGUAGE: 0
CONSOLABILITY: 0
NEGVOCALIZATION: 0
CONSOLABILITY: 0
BODYLANGUAGE: 0
NEGVOCALIZATION: 0
CONSOLABILITY: 0
BREATHING: 0
NEGVOCALIZATION: 0
CONSOLABILITY: 0
CONSOLABILITY: 0
BREATHING: 0
NEGVOCALIZATION: 0
CONSOLABILITY: 0
CONSOLABILITY: 0
BODYLANGUAGE: 0
FACIALEXPRESSION: 0
NEGVOCALIZATION: 0
BODYLANGUAGE: 0
CONSOLABILITY: 0
TOTALSCORE: 0
CONSOLABILITY: 0
BREATHING: 0
FACIALEXPRESSION: 0
CONSOLABILITY: 0
FACIALEXPRESSION: 0
CONSOLABILITY: 0
CONSOLABILITY: 0
BODYLANGUAGE: 0
BREATHING: 0
NEGVOCALIZATION: 0
NEGVOCALIZATION: 0
CONSOLABILITY: 0
BODYLANGUAGE: 0
BREATHING: 0
CONSOLABILITY: 0
CONSOLABILITY: 0
BREATHING: 0
NEGVOCALIZATION: 0
NEGVOCALIZATION: 0
TOTALSCORE: 0
CONSOLABILITY: 0
BREATHING: 0
NEGVOCALIZATION: 0
FACIALEXPRESSION: 0
TOTALSCORE: 0
BREATHING: 0
FACIALEXPRESSION: 0
BREATHING: 0
FACIALEXPRESSION: 0
TOTALSCORE: 0
BREATHING: 0
TOTALSCORE: 0
BREATHING: 0
FACIALEXPRESSION: 0
TOTALSCORE: 0
BODYLANGUAGE: 0
NEGVOCALIZATION: 0
CONSOLABILITY: 0
TOTALSCORE: 0
NEGVOCALIZATION: 0
CONSOLABILITY: 0
CONSOLABILITY: 0
TOTALSCORE: 0
FACIALEXPRESSION: 0
BODYLANGUAGE: 0
BODYLANGUAGE: 0
FACIALEXPRESSION: 0
FACIALEXPRESSION: 0
TOTALSCORE: 0
TOTALSCORE: 0
FACIALEXPRESSION: 0
TOTALSCORE: 0
BODYLANGUAGE: 0
NEGVOCALIZATION: 0
NEGVOCALIZATION: 0
FACIALEXPRESSION: 0
BODYLANGUAGE: 0
CONSOLABILITY: 0
TOTALSCORE: 0
TOTALSCORE: 0
BREATHING: 0
BODYLANGUAGE: 0
TOTALSCORE: 0
BREATHING: 0
NEGVOCALIZATION: 0
BREATHING: 0
NEGVOCALIZATION: 0
NEGVOCALIZATION: 0
TOTALSCORE: 0
CONSOLABILITY: 0
TOTALSCORE: 0
CONSOLABILITY: 0
FACIALEXPRESSION: 0
BREATHING: 0
BODYLANGUAGE: 0
BODYLANGUAGE: 0
BREATHING: 0
CONSOLABILITY: 0
NEGVOCALIZATION: 0
TOTALSCORE: 0
FACIALEXPRESSION: 0
FACIALEXPRESSION: 0
TOTALSCORE: 0
NEGVOCALIZATION: 0

## 2019-01-01 ASSESSMENT — PAIN DESCRIPTION - LOCATION
LOCATION: HIP
LOCATION: HIP
LOCATION: BACK
LOCATION: HIP
LOCATION: HIP
LOCATION: BACK
LOCATION: HIP

## 2019-01-01 ASSESSMENT — PAIN DESCRIPTION - PAIN TYPE
TYPE: CHRONIC PAIN
TYPE: ACUTE PAIN
TYPE: ACUTE PAIN
TYPE: CHRONIC PAIN
TYPE: CHRONIC PAIN
TYPE: ACUTE PAIN

## 2019-01-01 ASSESSMENT — PAIN DESCRIPTION - ORIENTATION
ORIENTATION: LEFT;OTHER (COMMENT)
ORIENTATION: LEFT
ORIENTATION: POSTERIOR;LOWER
ORIENTATION: LEFT
ORIENTATION: LEFT

## 2019-01-01 ASSESSMENT — PATIENT HEALTH QUESTIONNAIRE - PHQ9
SUM OF ALL RESPONSES TO PHQ QUESTIONS 1-9: 0
SUM OF ALL RESPONSES TO PHQ QUESTIONS 1-9: 0
1. LITTLE INTEREST OR PLEASURE IN DOING THINGS: 0
SUM OF ALL RESPONSES TO PHQ9 QUESTIONS 1 & 2: 0
2. FEELING DOWN, DEPRESSED OR HOPELESS: 0

## 2019-01-01 ASSESSMENT — PAIN DESCRIPTION - ONSET
ONSET: ON-GOING
ONSET: ON-GOING

## 2019-01-01 ASSESSMENT — PAIN - FUNCTIONAL ASSESSMENT
PAIN_FUNCTIONAL_ASSESSMENT: ACTIVITIES ARE NOT PREVENTED
PAIN_FUNCTIONAL_ASSESSMENT: ACTIVITIES ARE NOT PREVENTED

## 2019-01-01 ASSESSMENT — PAIN DESCRIPTION - FREQUENCY
FREQUENCY: INTERMITTENT

## 2019-03-19 PROBLEM — K92.2 GI BLEED: Status: ACTIVE | Noted: 2019-01-01

## 2019-03-19 PROBLEM — K92.2 GIB (GASTROINTESTINAL BLEEDING): Status: ACTIVE | Noted: 2019-01-01

## 2019-03-20 PROBLEM — K92.1 BLOOD IN STOOL: Status: ACTIVE | Noted: 2019-01-01

## 2019-03-20 PROBLEM — I25.5 ISCHEMIC CARDIOMYOPATHY: Status: ACTIVE | Noted: 2019-01-01

## 2019-03-20 PROBLEM — N17.9 AKI (ACUTE KIDNEY INJURY) (HCC): Status: RESOLVED | Noted: 2019-01-01 | Resolved: 2019-01-01

## 2019-03-20 PROBLEM — N17.9 AKI (ACUTE KIDNEY INJURY) (HCC): Status: ACTIVE | Noted: 2019-01-01

## 2019-03-28 NOTE — CARE COORDINATION
Ambulatory Care Coordination Note  3/28/2019  CM Risk Score: 15  Isaiah Mortality Risk Score: 31    ACC: Elke Muñoz, RN    Summary Note:     Reason For Call Today:   Patient in the office today to see Dr. Jhonatan Gonzalez.  -Discussed options with patient/daughter for home health vs. Private duty. -patient considering cleaning lady for him and his wife.  -patient to start outpatient cardiac rehab. Patient looking forward to this. -Denies wanting/needing home health at this time due to starting cardiac rehab soon.   -Daughter is agreeable.   -patient requesting meals on wheels  -phone call to meals on wheels [phone]; and requested staff to contact patient/spouse to set these up. Reason Patient is in Care coordination: CHF-recent hospitalization. Last MD appointment/Note Reviewed:  ·  Patient last seen PCP  On Today, and was instructed to:   Acute on chronic systolic CHF (congestive heart failure) ([address]  Pt feeling better. Pt taking daily wt and low salt diet. Updated all medications. Our nurse coordinator will speak with pt and his daughter. Pt had appt APril 2nd with Cardio, Dr Bahman Montaño     Chronic kidney disease, stage III (moderate) ([address]  Pt had appt w/ Nephro, Dr Abram Castro earlier today and all meds update. Pt not to take NSAIDS, keep BP and sugars low. Pt will ck LE doppler today to r/o DVT due to swelling in legs   -     Hemoglobin    Any specialist appointments? Dr. Bahman Montaño (Cardiology) . Any ED visits or Hospitalizations? · Patient has had 0 ED visits, and 0 hospitalizations since last care coordination reach out. · \"informsTess is really feeling much better\"    Medications Reviewed with  today:  · Patient  verbalizes that he has all of his medications. · Does patient have any cost issues with medications No, denies.     Assessments completed:   Diabetes Assessment    Medic Alert ID:  No  Meal Planning:  Avoidance of concentrated sweets   How often do you test your blood sugar?:  Daily   Do you have barriers with adherence to non-pharmacologic self-management interventions? (Nutrition/Exercise/Self-Monitoring):  No   Have you ever had to go to the ED for symptoms of low blood sugar?:  No       No patient-reported symptoms   Do you have hyperglycemia symptoms?:  No   Do you have hypoglycemia symptoms?:  No   Blood Sugar Trends:  No Change      ,   Congestive Heart Failure Assessment    Are you currently restricting fluids?:  No Restriction  Do you understand a low sodium diet?:  Yes  Do you understand how to read food labels?:  Yes  Do you salt your food before tasting it?:  No     No patient-reported symptoms      Symptoms:   CHF associated leg swelling: Neg      Symptom course:  stable  Weight trend:  stable (Comment: \"monitoring weights daily\")      and   General Assessment    Do you have any symptoms that are causing concern?:  No           Zone Management tool reviewed today: Yes, CHF, Diabetes    Any other new concerns Reported: none    Reviewed social needs/Home Needs if any:     · Does patient have enough food? Yes, but with spouse and patient both having recent health issues; requesting meals on wheels (called to set these up). · Family also assisting with food    · Does patient have transportation to appointment/store/pharmacy, etc?Yes, family typically transporting    · Does patient need any help in the home? Possibly with cleaning    ·  Is patient independent with ADL's/IADL'S? yes    Reviewed Upcoming follow up appointments with   · 04/02/19 Dr. Bettie Tyler:    Goals reviewed. Patient to continue to work to improve:   Goals        Care Coordination     Conditions and Symptoms      I will schedule office visits, as directed by my provider. I will keep my appointment or reschedule if I have to cancel. I will notify my provider of any barriers to my plan of care. I will follow my Zone Management tool to seek urgent or emergent care.   I will notify my provider of any symptoms that indicate a worsening of my condition. Patient to monitor blood sugars closely. Recommend daily weights first thing in the morning. Patient, \"checking weights only 1-2 weekly. Barriers: overwhelmed by complexity of regimen  Plan for overcoming my barriers: patient to follow with care coordinator, and follow closely with cardiology  Confidence: 7/10  Anticipated Goal Completion Date: 05/01/19                Education Reviewed with patient today: See Education Module: Comment: Daibetes education reviewed with carbohydrate counting reviewed. Blood sugar goals reviewed. CHF precautions/monitoring reviewed. Discussed daily weights, low sodium diet    · Reviewed plan of care with patient/patient's daughter Patient denies any questions today. · Patient to reach out to care coordinator at [phone] with questions. · Reminded patient of walk in care clinic availability/hours; and when to utilize the facility if MD office not available. Follow-up care is a key part of your treatment and safety. Be sure to make and go to all appointments, and call your doctor if you are having problems. It's also a good idea to know your test results and keep a list of the medicines you take. Patient  encouraged to contact PCP office for any questions or concerns. Patient verbalizes understanding. Care Coordinator Plan of Care: This nurse CC will plan to follow up with patient next week.   -Assess weights, follow up after Dr. Bharati Esquivel appointment                 Care Coordination Interventions    Program Enrollment:  Complex Care  Referral from Primary Care Provider:  Yes  Suggested Interventions and Community Resources  Meals on Wheels:  [address] or Pill Pack:  Declined  Specialty Services Referral:  Completed (Comment: Patient following with Cardiology)  Transportation Support:  Declined  Zone Management Tools:  Completed (Comment: mailed to patient CHF and Diabetes zones)         Prior to Admission medications    Medication Sig Start Date End Date Taking? Authorizing Provider   losartan (COZAAR) 25 MG tablet Take 1 tablet by mouth nightly 3/26/19   Davidson Henderson MD   carvedilol (COREG) 12.5 MG tablet Take 1 tablet by mouth 2 times daily 3/26/19   Davidson Henderson MD   ONE TOUCH ULTRASOFT LANCETS MISC Test blood sugar twice daily 3/26/19   Nataliia Cohn MD   blood glucose test strips (ASCENSIA AUTODISC VI;ONE TOUCH ULTRA TEST VI) strip One touch ultra strips. Test blood sugar BID,DIAGNOIS:SM TYPE II 3/26/19   Nataliia Cohn MD   sodium bicarbonate 650 MG tablet Take 2 tablets by mouth 2 times daily 3/25/19   Ligia NEVES Blood, DO   bumetanide (BUMEX) 1 MG tablet Take 1 tablet by mouth daily 3/26/19   Ligia NEVES Blood, DO   NIFEdipine (PROCARDIA XL) 60 MG extended release tablet TAKE 1 TABLET BY MOUTH TWICE DAILY 3/2/19   Nataliia Cohn MD   HUMULIN 70/30 (70-30) 100 UNIT/ML injection vial INJECT 50 UNITS EVERY MORNING AND INJECT 40 UNITS EVERY EVENING 3/2/19   Nataliia Cohn MD   hydrALAZINE (APRESOLINE) 100 MG tablet TAKE 1 TABLET BY MOUTH THREE TIMES DAILY 1/29/19   Historical Provider, MD   isosorbide mononitrate (IMDUR) 60 MG extended release tablet TAKE 1 TABLET BY MOUTH TWICE DAILY 2/1/19   Historical Provider, MD   Insulin Syringe-Needle U-100 (B-D INS SYR ULTRAFINE 1CC/30G) 30G X 1/2\" 1 ML MISC Use 2 daily   DX: E11.9 12/5/18   Nataliia Cohn MD   gemfibrozil (LOPID) 600 MG tablet Take 600 mg by mouth daily    Historical Provider, MD   aspirin 325 MG EC tablet Take 1 tablet by mouth daily 11/19/18   Nataliia Cohn MD   famotidine (PEPCID) 20 MG tablet TAKE 1 TABLET BY MOUTH DAILY. 11/19/18   Nataliia Chon MD   simvastatin (ZOCOR) 40 MG tablet TAKE 1 TABLET BY MOUTH NIGHTLY 11/19/18   Nataliia Cohn MD   clopidogrel (PLAVIX) 75 MG tablet TAKE 1 TABLET BY MOUTH DAILY.  9/10/18   Nataliia Cohn MD   fluticasone (FLONASE) 50 MCG/ACT nasal spray 1 spray by Nasal route daily Both nostrils daily 6/29/18   Cordell Mcknight

## 2019-04-02 NOTE — PROGRESS NOTES
Ov Dr. Yandy Abraham for hospital follow up   Here today with Dr. Jerica Santoyo into er on 03/19 with sob   Currently wearing life vest   Will be seeing wisam in the CHF clinic   C/o cold which is causing so sob   No chest pain heaviness  C/o very weak  C/o sob when walking   No dizziness and lightheadedness  C/o b/l feet swelling since 03/27  Had u/s no dvt  Saw Dr. Chetna Rasheed last week   Next appt is in July  Not sob at night when sleeping      Recommend to get support stockings-  Mild-one size bigger than your shoe size   Decrease  mg to 81 mg (baby asa)  Will HOLD Plavix (Clopidogrel)  Will get cbc/bmp for Wisam on Tuesday   Will follow up with Dr. Yandy Abraham in one month  With cbc/cmp prior to visit

## 2019-04-02 NOTE — LETTER
Alma Delia Ryan M.D. [address] [place] [address]  [phone]        2019        Radha Caraballo MD  [address] [address]    RE:   Serena Rigoberto  :  1940    Dear Dr. Nguyễn Solid:    CHIEF COMPLAINT:  1. Severe cardiomyopathy. 2.  Hypertension. 3.  Chronic renal insufficiency with a creatinine up to 2. 16.  4.  Coronary artery disease. HISTORY OF PRESENT ILLNESS:  I had the pleasure of seeing Mr. Riya Christiansen in our office on 2019. He is a pleasant 65-year-old gentleman with extensive cardiac history with bypass surgery in [facility] in  with a LIMA to the LAD, a vein graft to the OM, and a vein graft to the PDA of the right coronary artery. On 07/15/2012, he had an inferior myocardial infarction and sent to [facility], and a catheterization showed occluded vein graft to the right coronary artery with an occluded native right coronary artery, 95% disease in the vein graft to the OM, with a patent LIMA to the LAD. He had an angioplasty of the vein graft to the OM placing 3.5 x 32 and 3.5 x 24 mm bare-metal stents. His EF was 30% and it had increased to 40% to 45%. He had no warning signs prior to his bypass surgery or angioplasty. He presented to the hospital on 10/05/2018 with severe hypertension, blood pressure 210/100, transferred to [facility].  Echocardiogram showed an EF of 25% with severely enlarged left atrium and left ventricle with moderately enlarged right ventricle; EF was 25%, which had been a significant change from previously. He had a catheterization by Dr. Janie Flowers on 10/09/2018 that showed occluded circumflex with a patent vein graft to the circumflex, the vein graft to the right coronary artery and the right coronary artery were occluded, LIMA was patent to the LAD, EF of 30%, medical therapy recommended.   Of note, his catheterization on 10/09/2018 was unchanged from what it was in 2012, after having his angioplasty of the vein graft. He has had very labile hypertension, very difficult to control. I saw him last on 02/08/2019 and at that time, he seemed to be better. His EF appeared to be in the 30% range. I increased his Coreg to 25 mg b.i.d.    I saw him on 03/01/2019 and at that time, his blood pressure was 130 to 150/60s. He was doing relatively well and no changes were made, and he had an appointment for July. He then came to the emergency room on 03/19/2019. He had shortness of breath, which had been worse in the past month. His hemoglobin was 8.8, with his previous hemoglobin having been at 11.6. He was transferred to [facility]. [facility]. An echocardiogram was done in [facility]. [facility] on 03/20/2019, which showed an EF in the 20% range. His PA pressure was estimated at 40. He was not transfused. He did have acute on chronic renal insufficiency with his creatinine increasing to 2.25. Losartan was held and diuretics were adjusted, and his renal function improved and he was discharged. He was given a LifeVest and there was a discussion concerning AICD. Of note, he did not have any colonoscopy or endoscopy, and it was felt that he should have a GI workup as an outpatient. He is here with his daughter, Brooke Ayala. He does complain of cold and therefore, he is slightly more short of breath. He has no chest pain or chest discomfort. He is quite weak. He becomes very short of breath with any walking. He has no dizziness or lightheadedness. He has had bilateral feet swelling with it worse in the right leg. He saw Dr. Karla Haines last week who ordered a Doppler ultrasound of his right lower extremity, which was negative for DVT. There is a fair difference in the diameter of his right leg, which is much more edematous than his left leg. He has had no syncope or near syncope.   He does become quite short of breath with any activity. His O2 saturation, however, remained at 94% when we walked him in the hallway. He saw Dr. Nanci Sacks on 03/26, and at that time, his creatinine was 1.94. He has had no PND or orthopnea. No syncope or near syncope. He is wearing the LifeVest.    CARDIAC RISK FACTORS:  Known CAD:  Positive. Prior MI:  Positive. Bypass Surgery:  Positive. Hypertension:  Positive. Diabetes:  Positive. Smoking:  Negative. Other Family Members:  Positive. MEDICATIONS AT THIS TIME:  He is on aspirin 5 grains daily, Bumex 1 mg daily, Coreg 12.5 mg b.i.d., Plavix 75 mg daily, Pepcid 20 mg daily, iron 5 grains b.i.d., Flonase p.r.n., Lopid 600 mg daily, Humulin 70/30 50 units in the morning and 48 units in the evening, hydralazine 100 mg t.i.d., Imdur 60 mg b.i.d., Cozaar 25 mg nightly, multivitamins daily, Procardia XL 60 mg b.i.d., Zocor 40 mg daily, sodium bicarbonate 650 mg 2 tablets b.i.d. PAST MEDICAL HISTORY:  1. Bypass surgery in 2008. 2.  PTCA in 2012. 3.  Catheterization on 10/09/2018 by Dr. Isac Chen. 4.  Tracheal obstruction after intubation in 2008. 5.  Eye surgery. 6.  He has stage III kidney disease. FAMILY HISTORY:  Positive for CAD with his father dying at 61 of heart disease. One sister alive. SOCIAL HISTORY:  He is 70years old. Does not smoke or drink alcohol. Three girls, 11 grandchildren, 4 great-grandchildren. Quit smoking 46 years ago. Enjoys gardening. His daughter, Sanjuanita Hector, was with him today. REVIEW OF SYSTEMS:  Cardiac as above. Other systems reviewed including constitutional, eyes, ears, nose and throat, cardiovascular, respiratory, GI, , musculoskeletal, integumentary, neurologic, psychiatric, endocrine, hematologic and allergic/immunologic and are negative except for what is described above.     PHYSICAL EXAMINATION:  VITAL SIGNS:  His blood pressure was 130/60 with a heart rate of 75 and regular. Respiratory rate 18. O2 saturation 96%. Weight 191 pounds. GENERAL:  He is a pleasant 70-year-old gentleman. Denied pain. He was oriented to person, place and time. Answered questions appropriately. SKIN:  No unusual skin changes. HEENT:  The pupils are equally round and reactive to light and accommodation. Extraocular movements were intact. Mucous membranes were dry. NECK:  No JVD. Good carotid pulses. No carotid bruits. No lymphadenopathy or thyromegaly. CARDIOVASCULAR EXAM:  S1 and S2 normal.  No S3 or S4. Soft systolic blowing type murmur. No diastolic murmur. PMI was normal.  No lift, thrust, or pericardial friction rub. LUNGS:  Quite clear to auscultation and percussion. ABDOMEN:  Soft and nontender. Good bowel sounds. No hepatomegaly, splenomegaly. EXTREMITIES:  Good femoral pulses. Good pedal pulses. His right leg had at least 3+ edema, much larger than the left leg. His left leg had 1 to 2+ edema. NEUROLOGIC EXAM:  Unremarkable. PSYCHIATRIC EXAM:  Unremarkable. LABORATORY DATA:  From 04/02, sodium 141, potassium 4.5, BUN 55, creatinine 2.16. Hemoglobin A1c of 6.2. His last hemoglobin was 8.5 on 03/23/2019. Echocardiogram on 03/20/2019 in [facility]. [facility] was read as an ejection fraction of 20% with a PA pressure of 40. IMPRESSION:  1. Cardiomyopathy with a combination of ischemic and idiopathic with the EF currently at 20% on an echocardiogram on 03/20/2019 at [facility]. [facility] with it having previously been in the 40% range. 2.  Severe CAD. 3.  Bypass surgery in 2008 at [facility]. [facility] with KAPOOR to the LAD, a vein graft to the OM, and a vein graft to the right coronary artery. 4.   Inferior myocardial infarction on 07/15/2012, with a catheterization at that time by Dr. Klarissa Mayer showing occluded right coronary artery and occluded vein graft to the right coronary artery, suboccluded OM with 95% disease of the vein graft to the OM, occluded LAD with patent LIMA to the LAD, EF of 30%, with angioplasty of the vein graft to the OM of the circumflex placing 3.5 x 32 and 3.5 x 24 mm bare-metal stents. 5.  Very labile hypertension. 6.  Hypertensive urgency on 10/05/2018, transferred to [facility] with a catheterization on 10/09/2018 by Dr. Flavia Pierce that showed occluded circumflex, occluded right coronary artery, and occluded LAD with an occluded vein graft to the right coronary artery, patent LIMA to the LAD, with a patent bypass graft to the circumflex, therefore essentially unchanged from 2012 after he had had his circumflex vein graft stented. 7.  Hyperlipidemia, under good control. 8.  Insulin-dependent diabetes, under good control. 9.  Chronic renal insufficiency, worse now with his creatinine up to 2.16.  10.  Anemia with a hemoglobin of 11 in February, down to 8.8 on 03/19, unknown etiology. 11.  Edematous right lower extremity, much more than left lower extremity with negative ultrasound for DVT. PLAN:  1. Stop Plavix in view of his anemia. 2.  Decrease aspirin from 5 grains to 81 mg daily. 3.  Support hose for his right lower extremity especially. 4.  We will see him with Silviano Bill this coming Tuesday where we will get a CBC and a BMP. 5.  I will see in 1 month. DISCUSSION:  Mr. Reynold Rinne has developed anemia of unknown etiology. He was guaiac positive and is most likely a GI. This was found on 03/19. In February, his hemoglobin was 11 and down to 8.1. It is now up to 8.5. He is on Plavix and on full-strength aspirin. I will stop the Plavix because of his anemia and guaiac-positive stools, and we will decrease his aspirin to 81 mg daily. I think his recent decline in his LV function was due to hypertension and idiopathic cardiomyopathy. His anatomy really is unchanged from 2012 after the angioplasty of the vein graft to the circumflex. Therefore, there is no ischemic reason for him to have worsening LV function. He does have a LifeVest, and there will need to be a discussion in the future concerning ICD. He is 70years old, and I think it would be a reasonable option. I will see him this coming Tuesday with Wisam, and we will repeat a Chem-7 and a CBC. At some point again, I think he will need a full GI evaluation. His renal function has worsened with the creatinine up to 2.16. I think he is mildly dehydrated. His right leg is fairly edematous, but his left leg has only 1 to 2+ edema, and I think this will of course represent his body water. Also, his weight is down approximately 3 pounds. Therefore, we will not attempt any further diuresis, but we will leave his diuretics as is. It is going to be a long tenuous journey, but hopefully his LV function will gradually improve. I would like to get him in cardiac rehab at some point when he is a little bit stronger. Thank you very much for allowing me the privilege of seeing Mr. Silviano Rodriguez. If you have any questions on my thoughts, please do not hesitate to contact me.     Sincerely,        Edgar Ocampo    D: 04/03/2019 21:55:50     T: 04/04/2019 5:54:47     [email or web address]  Job#: [id number]   Doc#: [id number]

## 2019-04-02 NOTE — PATIENT INSTRUCTIONS
Recommend to get support stockings-  Mild-one size bigger than your shoe size   Decrease  mg to 81 mg (baby asa)  Will HOLD Plavix (Clopidogrel)  Will get cbc/bmp for Wisam on Tuesday   Will follow up with Dr. Cedrick Pollard in one month  With cbc/cmp prior to visit

## 2019-04-03 NOTE — CARE COORDINATION
to go to the ED for symptoms of low blood sugar?:  No       No patient-reported symptoms   Do you have hyperglycemia symptoms?:  No   Do you have hypoglycemia symptoms?:  No   Blood Sugar Monitoring Regimen:  Before Meals   Blood Sugar Trends:  No Change (Comment: \"Blood sugars controlled, most recent A1C 6.2 drawn 04/02/19)      ,   Congestive Heart Failure Assessment    Are you currently restricting fluids?:  No Restriction  Do you understand a low sodium diet?:  Yes  Do you understand how to read food labels?:  Yes  Do you salt your food before tasting it?:  No     No patient-reported symptoms      Symptoms:      Symptom course:  improving  Weight trend:  fluctuating minimally (Comment: informs\"fluctuating 189-191 pounds\" instructed to monitor closely. denies any weight increase of 3 pounds overnight or 5 pounds in a week)      and   General Assessment    Do you have any symptoms that are causing concern?:  No         Lab Results   Component Value Date    LABA1C 6.2 (H) 04/02/2019     Lab Results   Component Value Date     04/02/2019       Zone Management tool reviewed today: Yes, Diabetes, CHF    Reviewed social needs/Home Needs if any:     · Does patient have enough food? Yes,     · Does patient have transportation to appointment/store/pharmacy, etc?Yes    · Does patient need any help in the home? no    ·  Is patient independent with ADL's/IADL'S? yes    Reviewed Upcoming follow up appointments with   · 04/09/19 CHF clinic with labs prior:    Goals reviewed. Patient to continue to work to improve:   Goals        Care Coordination     Conditions and Symptoms      I will schedule office visits, as directed by my provider. I will keep my appointment or reschedule if I have to cancel. I will notify my provider of any barriers to my plan of care. I will follow my Zone Management tool to seek urgent or emergent care.   I will notify my provider of any symptoms that indicate a worsening of my condition. Patient to monitor blood sugars closely. Recommend daily weights first thing in the morning. Patient, \"checking weights only 1-2 weekly (encourage daily)  Patient to follow through with Cardiology orders/CHF Clinic. Barriers: overwhelmed by complexity of regimen  Plan for overcoming my barriers: patient to follow with care coordinator, and follow closely with cardiology  Confidence: 7/10  Anticipated Goal Completion Date: 05/01/19                Education Reviewed with patient today: See Education Module: Comment: recommend daily weights for close monitoring of fluids, paitent to monitor blood sugars daily; A1C controlled. patient wear compression stockings during the day. Follow up with CHF clinic. · Reviewed plan of care with patient. Patient denies any questions today. · Patient to reach out to care coordinator at [phone] with questions. · Reminded patient of walk in care clinic availability/hours; and when to utilize the facility if MD office not available. Follow-up care is a key part of your treatment and safety. Be sure to make and go to all appointments, and call your doctor if you are having problems. It's also a good idea to know your test results and keep a list of the medicines you take. Patient  encouraged to contact PCP office for any questions or concerns. patient verbalizes understanding. Care Coordinator Plan of Care:     This nurse CC will plan to follow up with patient in 1 week  -Follow up weights, blood sugars, follow up further congestion; patient denies wanting to schedule an appointment.   -follow up on repeat labs ordered prior to CHF clinic next week 04/09/19                 Care Coordination Interventions    Program Enrollment:  Complex Care  Referral from Primary Care Provider:  Yes  Suggested Interventions and [employer] on Wheels:  [address] or Pill Pack:  Declined  Specialty Services Referral:  Completed (Comment: Patient following with Cardiology)  Transportation Support:  Declined  Zone Management Tools:  Completed (Comment: mailed to patient CHF and Diabetes zones)           Prior to Admission medications    Medication Sig Start Date End Date Taking? Authorizing Provider   aspirin 81 MG tablet Take 81 mg by mouth daily    Historical Provider, MD   losartan (COZAAR) 25 MG tablet Take 1 tablet by mouth nightly 3/26/19   Joseph Strickland MD   carvedilol (COREG) 12.5 MG tablet Take 1 tablet by mouth 2 times daily 3/26/19   Joseph Strickland MD   ONE TOUCH ULTRASOFT LANCETS MISC Test blood sugar twice daily 3/26/19   Babs Pedraza MD   blood glucose test strips (ASCENSIA AUTODISC VI;ONE TOUCH ULTRA TEST VI) strip One touch ultra strips. Test blood sugar BID,DIAGNOIS:SM TYPE II 3/26/19   Babs Pedraza MD   sodium bicarbonate 650 MG tablet Take 2 tablets by mouth 2 times daily 3/25/19   Otilia NEVES Blood, DO   bumetanide (BUMEX) 1 MG tablet Take 1 tablet by mouth daily 3/26/19   Otilia NEVES Blood, DO   NIFEdipine (PROCARDIA XL) 60 MG extended release tablet TAKE 1 TABLET BY MOUTH TWICE DAILY 3/2/19   Babs Pedraza MD   HUMULIN 70/30 (70-30) 100 UNIT/ML injection vial INJECT 50 UNITS EVERY MORNING AND INJECT 40 UNITS EVERY EVENING 3/2/19   Babs Pedraza MD   hydrALAZINE (APRESOLINE) 100 MG tablet TAKE 1 TABLET BY MOUTH THREE TIMES DAILY 1/29/19   Historical Provider, MD   isosorbide mononitrate (IMDUR) 60 MG extended release tablet TAKE 1 TABLET BY MOUTH TWICE DAILY 2/1/19   Historical Provider, MD   Insulin Syringe-Needle U-100 (B-D INS SYR ULTRAFINE 1CC/30G) 30G X 1/2\" 1 ML MISC Use 2 daily   DX: E11.9 12/5/18   Babs Pedraza MD   gemfibrozil (LOPID) 600 MG tablet Take 600 mg by mouth daily    Historical Provider, MD   famotidine (PEPCID) 20 MG tablet TAKE 1 TABLET BY MOUTH DAILY.  11/19/18   Babs Pedraza MD   simvastatin (ZOCOR) 40 MG tablet TAKE 1 TABLET BY MOUTH NIGHTLY 11/19/18   Babs Pedraza MD   clopidogrel (PLAVIX)

## 2019-04-05 NOTE — PROGRESS NOTES
remained at 94% when we walked him in the hallway. He saw Dr. Citlaly Andujar on 03/26, and at that time, his creatinine was 1.94. He has had no PND or orthopnea. No syncope or near syncope. He is wearing the LifeVest.    CARDIAC RISK FACTORS:  Known CAD:  Positive. Prior MI:  Positive. Bypass Surgery:  Positive. Hypertension:  Positive. Diabetes:  Positive. Smoking:  Negative. Other Family Members:  Positive. MEDICATIONS AT THIS TIME:  He is on aspirin 5 grains daily, Bumex 1 mg daily, Coreg 12.5 mg b.i.d., Plavix 75 mg daily, Pepcid 20 mg daily, iron 5 grains b.i.d., Flonase p.r.n., Lopid 600 mg daily, Humulin 70/30 50 units in the morning and 48 units in the evening, hydralazine 100 mg t.i.d., Imdur 60 mg b.i.d., Cozaar 25 mg nightly, multivitamins daily, Procardia XL 60 mg b.i.d., Zocor 40 mg daily, sodium bicarbonate 650 mg 2 tablets b.i.d. PAST MEDICAL HISTORY:  1. Bypass surgery in 2008. 2.  PTCA in 2012. 3.  Catheterization on 10/09/2018 by Dr. Shannan Beck. 4.  Tracheal obstruction after intubation in 2008. 5.  Eye surgery. 6.  He has stage III kidney disease. FAMILY HISTORY:  Positive for CAD with his father dying at 61 of heart disease. One sister alive. SOCIAL HISTORY:  He is 70years old. Does not smoke or drink alcohol. Three girls, 11 grandchildren, 4 great-grandchildren. Quit smoking 46 years ago. Enjoys gardening. His daughter, Wilman Santamaria, was with him today. REVIEW OF SYSTEMS:  Cardiac as above. Other systems reviewed including constitutional, eyes, ears, nose and throat, cardiovascular, respiratory, GI, , musculoskeletal, integumentary, neurologic, psychiatric, endocrine, hematologic and allergic/immunologic and are negative except for what is described above. PHYSICAL EXAMINATION:  VITAL SIGNS:  His blood pressure was 130/60 with a heart rate of 75 and regular. Respiratory rate 18. O2 saturation 96%. Weight 191 pounds.   GENERAL:  He is a pleasant 70year-old gentleman. Denied pain. He was oriented to person, place and time. Answered questions appropriately. SKIN:  No unusual skin changes. HEENT:  The pupils are equally round and reactive to light and accommodation. Extraocular movements were intact. Mucous membranes were dry. NECK:  No JVD. Good carotid pulses. No carotid bruits. No lymphadenopathy or thyromegaly. CARDIOVASCULAR EXAM:  S1 and S2 normal.  No S3 or S4. Soft systolic blowing type murmur. No diastolic murmur. PMI was normal.  No lift, thrust, or pericardial friction rub. LUNGS:  Quite clear to auscultation and percussion. ABDOMEN:  Soft and nontender. Good bowel sounds. No hepatomegaly, splenomegaly. EXTREMITIES:  Good femoral pulses. Good pedal pulses. His right leg had at least 3+ edema, much larger than the left leg. His left leg had 1 to 2+ edema. NEUROLOGIC EXAM:  Unremarkable. PSYCHIATRIC EXAM:  Unremarkable. LABORATORY DATA:  From 04/02, sodium 141, potassium 4.5, BUN 55, creatinine 2.16. Hemoglobin A1c of 6.2. His last hemoglobin was 8.5 on 03/23/2019. Echocardiogram on 03/20/2019 in [facility] was read as an ejection fraction of 20% with a PA pressure of 40. IMPRESSION:  1. Cardiomyopathy with a combination of ischemic and idiopathic with the EF currently at 20% on an echocardiogram on 03/20/2019 at [facility] with it having previously been in the 40% range. 2.  Severe CAD. 3.  Bypass surgery in 2008 at [facility] with KAPOOR to the LAD, a vein graft to the OM, and a vein graft to the right coronary artery. 4.   Inferior myocardial infarction on 07/15/2012, with a catheterization at that time by Dr. Eleonora Crockett showing occluded right coronary artery and occluded vein graft to the right coronary artery, suboccluded OM with 95% disease of the vein graft to the OM, occluded LAD with patent LIMA to the LAD, EF of 30%, with angioplasty of the vein graft to the OM of the circumflex placing 3.5 x 32 and 3.5 x 24 mm bare-metal stents. 5.  Very labile hypertension. 6.  Hypertensive urgency on 10/05/2018, transferred to [facility] with a catheterization on 10/09/2018 by Dr. Aj Rees that showed occluded circumflex, occluded right coronary artery, and occluded LAD with an occluded vein graft to the right coronary artery, patent LIMA to the LAD, with a patent bypass graft to the circumflex, therefore essentially unchanged from 2012 after he had had his circumflex vein graft stented. 7.  Hyperlipidemia, under good control. 8.  Insulin-dependent diabetes, under good control. 9.  Chronic renal insufficiency, worse now with his creatinine up to 2.16.  10.  Anemia with a hemoglobin of 11 in February, down to 8.8 on 03/19, unknown etiology. 11.  Edematous right lower extremity, much more than left lower extremity with negative ultrasound for DVT. PLAN:  1. Stop Plavix in view of his anemia. 2.  Decrease aspirin from 5 grains to 81 mg daily. 3.  Support hose for his right lower extremity especially. 4.  We will see him with Rosalba Amato this coming Tuesday where we will get a CBC and a BMP. 5.  I will see in 1 month. DISCUSSION:  Mr. Ludwin Valle has developed anemia of unknown etiology. He was guaiac positive and is most likely a GI. This was found on 03/19. In February, his hemoglobin was 11 and down to 8.1. It is now up to 8.5. He is on Plavix and on full-strength aspirin. I will stop the Plavix because of his anemia and guaiac-positive stools, and we will decrease his aspirin to 81 mg daily. I think his recent decline in his LV function was due to hypertension and idiopathic cardiomyopathy. His anatomy really is unchanged from 2012 after the angioplasty of the vein graft to the circumflex. Therefore, there is no ischemic reason for him to have worsening LV function. He does have a LifeVest, and there will need to be a discussion in the future concerning ICD.   He is 70years old, and I think it would be a reasonable option. I will see him this coming Tuesday with Wisam, and we will repeat a Chem-7 and a CBC. At some point again, I think he will need a full GI evaluation. His renal function has worsened with the creatinine up to 2.16. I think he is mildly dehydrated. His right leg is fairly edematous, but his left leg has only 1 to 2+ edema, and I think this will of course represent his body water. Also, his weight is down approximately 3 pounds. Therefore, we will not attempt any further diuresis, but we will leave his diuretics as is. It is going to be a long tenuous journey, but hopefully his LV function will gradually improve. I would like to get him in cardiac rehab at some point when he is a little bit stronger. Thank you very much for allowing me the privilege of seeing Mr. Dontrell Damon. If you have any questions on my thoughts, please do not hesitate to contact me.     Sincerely,        Raheem Ozuna    D: 04/03/2019 21:55:50     T: 04/04/2019 5:54:47     [email or web address]  Job#: [id number]   Doc#: [id number]

## 2019-04-08 NOTE — TELEPHONE ENCOUNTER
Patient wife stopped in the office because the meter that they have is showing America Mcburney  The patient doesn't have the user guide to see what that means   I did attempt to get it to work and it will not read any sugars   New meter pending

## 2019-04-09 NOTE — CARE COORDINATION
[facility] Transitions Follow Up Call- 1st attempt at final call     2019    Patient: Jannette Cruz  Patient : 1940   MRN: [id number]   Reason for Admission: GI hemorrhage, CHF  Discharge Date: 3/25/19   RARS: Readmission Risk Score: 27  CMRS 15       Attempted to reach patient for final transitional call. VM left to return call to [phone]. Care Transitions Subsequent and Final Call    Subsequent and Final Calls  Do you currently have any active services?:  No  Care Transitions Interventions  Other Interventions:             Follow Up  Future Appointments   Date Time Provider Laura Franco   2019  9:00 AM SCHEDULE, HEART FAILURE DEPARTMENT [facility] CARD [facility]   2019  9:30 AM MD Jia Najera [facility]   5/15/2019  7:00 AM MD JOESPH Miller [facility]   2019 10:30 AM [facility] ECHO ROOM [facility] ECHO [facility]   2019  1:30 PM MD Jia Najera [facility]       Jd Louie RN

## 2019-04-11 NOTE — DISCHARGE INSTR - COC
 Normocytic hypochromic anemia D50.9    Chronic kidney disease, stage III (moderate) ([facility]) N18.3    Vitamin D deficiency disease E55.9    Essential hypertension I10    Acute on chronic systolic CHF (congestive heart failure) ([facility]) I50.23    GIB (gastrointestinal bleeding) K92.2    Blood in stool K92.1    Ischemic cardiomyopathy I25.5       Isolation/Infection:   Isolation          No Isolation            Nurse Assessment:  Last Vital Signs: BP (!) 148/52   Pulse 72   Resp 20   Wt 193 lb 6.4 oz (87.7 kg)   SpO2 93%   BMI 29.41 kg/m²     Last documented pain score (0-10 scale):    Last Weight:   Wt Readings from Last 1 Encounters:   04/11/19 193 lb 6.4 oz (87.7 kg)     Mental Status:  {IP PT MENTAL STATUS:20030}    IV Access:  { HEENA IV ACCESS:750364748}    Nursing Mobility/ADLs:  Walking   {CHP DME ESVY:930016773}  Transfer  {P DME LHFC:970450152}  Bathing  {P DME QCDN:400842995}  Dressing  {CHP DME NECQ:669330153}  Toileting  {CHP DME FYKW:461011732}  Feeding  {P DME ZQQU:175852879}  Med Admin  {P DME ZJFZ:690114287}  Med Delivery   { HEENA MED Delivery:957257748}    Wound Care Documentation and Therapy:        Elimination:  Continence:   · Bowel: {YES / DP:77765}  · Bladder: {YES / XD:83957}  Urinary Catheter: {Urinary Catheter:175521767}   Colostomy/Ileostomy/Ileal Conduit: {YES / YV:12375}       Date of Last BM: ***  No intake or output data in the 24 hours ending 04/11/19 0926  No intake/output data recorded.     Safety Concerns:     [address] Safety Concerns:192920273}    Impairments/Disabilities:      [address] Impairments/Disabilities:726279097}    Nutrition Therapy:  Current Nutrition Therapy:   [address] Diet List:634437625}    Routes of Feeding: {CHP DME Other Feedings:050635898}  Liquids: {Slp liquid thickness:53261}  Daily Fluid Restriction: {CHP DME Yes amt example:483780748}  Last Modified Barium Swallow with Video (Video Swallowing Test): {Done Not Done TOVM:065197515}    Treatments at the Time of Hospital Discharge:   Respiratory Treatments: ***  Oxygen Therapy:  {Therapy; copd oxygen:81451}  Ventilator:    {[facility] Vent UHCS:243213038}    Rehab Therapies: {THERAPEUTIC INTERVENTION:5263150364}  Weight Bearing Status/Restrictions: {[facility] Weight Bearin}  Other Medical Equipment (for information only, NOT a DME order):  {EQUIPMENT:575217292}  Other Treatments: ***    Patient's personal belongings (please select all that are sent with patient):  {[facility] DME Belongings:398990613}    RN SIGNATURE:  {Esignature:032277298}    CASE MANAGEMENT/SOCIAL WORK SECTION    Inpatient Status Date: ***    Readmission Risk Assessment Score:  Readmission Risk              Risk of Unplanned Readmission:        0           Discharging to Facility/ Agency   · Name:   · Address:  · Phone:  · Fax:    Dialysis Facility (if applicable)   · Name:  · Address:  · Dialysis Schedule:  · Phone:  · Fax:    / signature: {Esignature:647005359}    PHYSICIAN SECTION    Prognosis: {Prognosis:7141035817}    Condition at Discharge: [facility] Patient Condition:437767224}    Rehab Potential (if transferring to Rehab): {Prognosis:3553760364}    Recommended Labs or Other Treatments After Discharge: ***    Physician Certification: I certify the above information and transfer of Hellen Fu  is necessary for the continuing treatment of the diagnosis listed and that he requires {Admit to Appropriate Level of Care:68161} for {GREATER/LESS:674025145} 30 days.      Update Admission H&P: {P DME Changes in DBJ:738835999}    PHYSICIAN SIGNATURE:  {Esignature:807833961}

## 2019-04-11 NOTE — PROGRESS NOTES
Chief Complaint:    Marked fatigue  Physical inactivity    Visit Diagnosis:    Systolic heart failure with LVEF 20%  Cardiomegaly  Small pleural effusions  Shortness of breath  Hypertension  Recent GI bleed  Chronic kidney disease    Objective:  Admitted to ED here on 3/19/19 c/o increased dyspnea X 1 month and GI bleed / was in heart failure had small pleural effusions / renal reserve further reduced / LVEF further reduced to 20%   Pre-visit medications include Bumex 1 mg, once daily; losartan 25 mg, nightly; carvedilol 12.5 mg, BID; nifedipine 60 mg, BID; Plavix 75 mg, once daily; hydralazine 100 mg, three times daily; 325 mg, once daily; Zocor 40 mg, qHS; sodium bicarbonate 650 mg, 2 tablets--two times daily  Serum creatinine down modestly to 2.05 from last check at Dr. Ryan Harrison office (2.16) and GFR today is up to 28,, and potassium is 4.4  [place] lungs are CTA, bilaterally but diminished in both bases / seen to have dyspnea with exertions  Body weight is stable at 193.3 lb lb today   HR 72  AND REGULAR, /52 in Lt arm, SPO2 93% ON RA, RR 20  Bilateral pedal and ankle edema is negligible and bilateral calves and thighs are not edematous  Abdomen rounded, soft, and non-tender  Tongue is moist and no JVD present    Subjective:  Patient denies unusual shortness of breath or cough today  C/O \"I still am tired and only able to walk short distances before having to stop. \"  Denies current bloody or tarry stools  Denies dizziness, rapid heart rate or palpitations, chest pain, orthopnea, or peripheral edema  Daughter, Dante Farmer, managing father's medications and home management  Entirely sedentary at present  Body weights at home range between 191 and 194 lb but <2 lb jump in a day  States appetite is good and sleeping adequately    Progress Narrarive:   This patient, accompanied by daughter Dante Farmer, arrives for heart failure management reassessment as follow-up to last HF Clinic visit on 3/26/19 and an OV with Dr. Praveen Durán on 4/2/19. Dr. Praveen Durán on 4/2/19 stopped his plavix and reduced ASA to 81 mg from 325 mg and noted serum creatinine had elevated to 2.16 and Hgb fell to 8.5. On 3/26 Mr. Aleisha Washington denied shortness of breath or other fluid overload s/s.  However, he did c/o marked fatigue and during hospital stay. This patient's body weight here on 3/19 was 192.1 lb and was 193.5 lb at hospital d/c yesterday.  Body weight here on 3/26 was 193.6 lb, which appeared to be his baseline.  No assessment findings point toward fluid overload, and this patient might have been mildly dry with serum creatinine elevated to 1.94 from 1.68 before.  Also Losartan, which was held for a period during hospital admission, had been restarted at 25 mg, qHS.  Hypertension was controlled with 136/52 in left arm and 140/60 in right arm.  No changes were made to medications. Today Mr. Aleisha Washington feels his energy and physical activity is progressing very slowly. He still reports feeling generally weak and fatigues and becomes short of breath with walking. He does report beginning to walk some in the house. Aleks Otoole denies black, tarry stools but hemoglobin remains decreased but stable at 8.4 (was 8.5 on 4/2/19). This is below his baseline. His kidney function is some improved with creatinine 2.05 (was 2.16 one week ago). Overall assessment findings indicate a stabilization in fluid balance. Mr. Harris Dears main goal today is to increase physical activity so that he may have more stamina and strength. Dr. Praveen Durán had previously place and order for Aleks Otoole to participate in cardiac rehab when clinically ready. No medication changes are made today, and an initial evaluation for cardiac rehab is scheduled for 4/12/19 at 1000. With no further questions, the AVS with discharge instructions are reviewed with Aleks Otoole and his daughter with voiced understanding received back.   Mr. Aleisha Washington agrees to begin cardiac rehab tomorrow and have his renal function recheck here in 1-week with a BMP. Patient Instructions:  1. NO CHANGES TO MEDICATIONS TODAY. 2.  REGISTER (ONE-TIME) FOR CARDIAC [facility] Friday AND APPT AT 10 A. M.    3.  BLOOD WORK BEFORE REHAB ON Friday, April 18 BEFORE REHAB AT 10 A. M. I have read and agree with all of the above.   Valentín Amaya MD

## 2019-04-11 NOTE — CARE COORDINATION
Marianne 45 Transitions Follow Up Call- 2nd attempt at final call     2019    Patient: Raysa Solitario  Patient : 1940   MRN: [id number]    Reason for Admission: GI hemorrhage, CHF  Discharge Date: 3/25/19   RARS: Readmission Risk Score: 27  CMRS 15     Attempted to reach patient for final transitional call. VM left to return call to [phone]. Care Transitions Subsequent and Final Call    Subsequent and Final Calls  Do you currently have any active services?:  No  Care Transitions Interventions  Other Interventions:             Follow Up  Future Appointments   Date Time Provider Laura Franco   2019 10:00 AM [facility] CARD REHAB THERAPIST 3 [facility] CARD Sujit Duong   4/15/2019 10:00 AM [facility] CARD REHAB THERAPIST 3 [facility] CARD Sujit Duong   2019 10:00 AM [facility] CARD REHAB THERAPIST 3 [facility] CARD Sujit Duong   2019  9:30 AM SCHEDULE, HEART FAILURE DEPARTMENT [facility] CARD Sujit [place]   2019 10:00 AM [facility] CARD REHAB THERAPIST 3 [facility] CARD Sujit [place]   2019 10:00 AM [facility] CARD REHAB THERAPIST 3 [facility] CARD Sujit [place]   2019 10:00 AM [facility] CARD REHAB THERAPIST 3 [facility] CARD Sujit [place]   2019 10:00 AM [facility] CARD REHAB THERAPIST 3 [facility] CARD Sujit Duong   2019 10:00 AM [facility] CARD REHAB THERAPIST 3 [facility] CARD Sujit [place]   2019  9:30 AM MD Dar Gordon [facility]   5/15/2019  7:00 AM MD JOESPH Wagner [facility]   2019 10:30 AM [facility] ECHO ROOM [facility] ECHO Sujit Duong   2019  1:30 PM MD Dar Gordon [facility]       Serena Parks RN

## 2019-04-12 NOTE — PROGRESS NOTES
Cardiac Rehab Initial History and Assessment    Maureen Ojeda 1940  4/12/2019    Primary Diagnosis: HEART FAILURE  Living Will: NO  On File: {NO  Durable Power of :NO    Medical History  Past Medical History:   Diagnosis Date    CAD (coronary artery disease)     MI on July 15, 2012    [facility] [address]     Chicken pox     CKD stage 3 due to type 2 diabetes mellitus ([facility] [address]     Hypertension     Measles     Mumps     Osteoarthritis     Tracheal obstruction 9/2012    trach after intubation for MI    Type II or unspecified type diabetes mellitus without mention of complication, not stated as uncontrolled      Past Surgical History:   Procedure Laterality Date    CARDIAC CATHETERIZATION Left 10/09/2018    Dr[facility]    [facility]    EYE SURGERY Bilateral     Cataract removal    TRACHEOTOMY         Family History  Family History   Problem Relation Age of Onset    Heart Attack Mother     Heart Attack Father     Diabetes type 2  Sister          Symptoms:  1. Angina   [] None   [] Tightness   [x] Shortness of Breath   [x] Fatigue    [] Nausea   [] Sharp, Stabbing  [] Pallor   [] Indigestion, Heartburn [x] Sweaty    Where was discomfort located? n/a  Precipitating Factors? PHYSICAL EXERTION  Relieved by: REST    2. Arrhythmia   [] None   [x] Irregular Beats (skips) [] Pacer    [] Atrial Fibrillation  [] AICD    On any Medications? COREG 12.5 MG, QD    3. Congestive Heart Failure   [] None   [] Pedal Edema  [x] Unusual weight gain   [x] SOB with mild exertion [] Fatigue    4. Vascular   [x] None   [] Carotid Narrowing  [] R [] L   [] Peripheral claudication [] R [] L    5. Musculoskeletal    [] None   [] Back Pain  Where? [x] Joint discomfort Where?  CHRONIC-INTERMITTENT RT HIP \"ACHE\"    Socio-Economic    Marital Status:     Nutrition    Appetite:  []  Too Good    [] Good  [x]  Poor    Diet: \"I EAT A HEALTHY DIET\"  Eating out 0 times/wk  Alcohol Consumption: [] Yes [x] No   Type:  Frequency:    Caffeine: [x] Yes [] No  Type: COFFEE  Amount: 1 CUP PER DAY    Water intake per day: 2 BOTTLES  Vitamins/Natural herbal products: MVI    Psychological    [] Depression  [] Tearful  [] Fearful  [x] Cheerful  [] Anxious  [x] Motivated  [] Overwhelmed    Treatment: N/A    Diabetes    [x] Yes  [] No  How long:   Latest BS: 92  Frequency of Checks: TWICE:  BEFORE BREAKFAST AND IN EVENING  Medication:   HUMULIN 70/30 50 UNITS MORNING AND 70 UNITS EVENING  Stress    Source: PT DENIES  Relaxation techniques: SITTING ON PORCH  Hobbies: GARDENING    Level of Education    [] 8th Grade  [] Associates  [] Masters  [x] [employer] [] Bachelor  []  Other:    Depression Screening:      *If greater than 5 symptoms listed,  notified. Cardiac Rehab Pre - Test  1. The heart is a muscle that acts like a pump to deliver oxygen and blood to the rest of the body. [x] True   [] False  2. Healing from the damage of a heart attach is complete in two weeks. [] True   [x] False  3. Smoking has no direct effect on the heart - it only effects your lungs. [] True   [x] False  4. Using all the salt you want is acceptable for all heart patients. [] True   [x] False  5. Chest pain that is relieved by rest or nitroglycerine is called Angina. [x] True   [] False  6. Shortness of breath, indigestion, sweating, tightness or pain in your chest are symptoms of a heart attack. [x] True   [] False  7. Swelling of the feet and ankles only means you've been on your feet too much. [x] True   [] False  8. Saturated fats raised your blood cholesterol level more than anything else in your diet. [x] True   [] False  9. High Blood pressure can take care of itself by rest alone. [] True   [x] False  10. Walking is one of the best exercises for heart attack patients.    [x] True   [] False  SCORE: 100%    Physical Findings  Cardiovascular- No edema, S1-S2 and apically regular to auscultation, strong bilateral upper and lower extremity pulses at +2, no carotid bruits. Monitor rhythm- SINUS RHYTHM W/ 1ST DEGREE A-V BLOCK  VR- 62  KS- 0.30  QRS- 0.12     QT- 0.50        Pulmonary- Clear to auscultation bilaterally through out. Neurological- No deficit noted or reported. Peripheral Vascular- No deficit noted or reported. Muscular Skeletal- [x] Joint discomfort Where? CHRONIC-INTERMITTENT RT HIP \"ACHE\"  Pain Assessment- DENIES AT PRESENT  Pain Level Tolerable <4+/10 on 10 point Likert scale  Location/ radiation/ Frequency/ Duration- N/A  Endocrine- DIABETIC  Gastrointestinal- No deficit noted or reported. Genitourinary- No deficit noted or reported. Physical limitations- only as per above    Goals: \"HAVE THE CONDITIONING TO GARDEN, WALK, AND GENERALLY GET BETTER. \"    -increased stamina/strength to 30-50 total exercise by increasing 1-2 level/wk and 1-2   min/wk  to achieve THR and RPE 11-15 / INCREASE AVG CARDIO FC BY AT LEAST 0.5-1.0 MET IN THE NEXT 30 DAYS AND TOLERATE >31-45 MIN W/IN EX RX TARGETS    DECREASED DYSPNEA AND FATIGUE WITH EXERTION    BODY WT STAYING W/IN 1 -2 LB VARIANCE PER DAY     -introduce 8-12 bilateral UE and LE progressive resistance exercises at 1-3 sets per lift, on 2-3 non-consecutive days using weights/ GREEN therabands AND WTS TO 8-24 # for 10-15 reps to progressive overload by increasing resistance once reps progressed to at least 15 reps on at least 2 occasions     -MAINTAIN OPTIMAL.  BP     -improved cholesterol and  Triglycerides      -develop regular exercise 30 min daily, AT LEAST 3-5 DAYS PER WEEK CONSISTENTLY W/IN THE NEXT 30 DAYS    Lower daily fasting blood glucose score to <131 and random after meal scores to <181 at home    To reduce self-reported psycho-social feelings of stress in next 30 days    To eat at least 2 servings of fruit per day and at least 4-5 servings of vegetables per day     MARISSA THOMAS RN, CEP

## 2019-04-12 NOTE — PROGRESS NOTES
Cardiac Rehabilitation   Physician Order Form    Cornel Merino  1940    [x] Phase 2 ECG Monitored Cardiac Rehabilitation    [] MI   [] PTCA with/without stents  [] CABG  [] Heart Valve Repair/Replaced   [] Stable Angina [x] Other: HEART FAILURE    Onset Date: 3/19/19                    Cardiac Education Goals: (see individualized treatment plan for specific goals, progression & compliance)    [x] Hypertension [x] Physical Inactivity  [x] A & P  [x] Smoking  [x] Coping/Depression  [x] Medications  [x] Diabetes  [x] Obesity   [x] Angina  [x] Hyperlipidemia [x] Stress   [x] Home Exercise                     Prescribed Exercise Plan:    Target HR: 72 - 84    Duration: 31 - 60 MIN  Frequency: 3 DAYS PER WEEK  Initial Met Level: 1.3 - 1.8 METS  Limitations:   [x] Joint discomfort          Where? CHRONIC-INTERMITTENT RT HIP \"ACHE\"    Modalities:  [x]Treadmill   [x] [place]umb. Stepper/bike  [] Elliptical  [x] UBE  [x] Weights/therabands    · Aerobic exercise to total 31-60 minutes. Progressing by 1-2 minutes per week and/or 1-2 levels per week per patient tolerance using various modalities; according to Blake Scale 11-13 and THR  · Introduce 8-12 bilateral UE and LE progressive resistance exercises at 1-3 sets per lift, on 2-3 non-consecutive days using weights/ GREEN therabands AND WTS TO 8-24 # for 10-15 reps to progressive overload by increasing resistance once reps progressed to at least 15 reps on at least 2 occasions                Per patient symptoms use:  · Appropriate ACLS Algorhythm for Cardiac Events. · Nitroglycerine 0.4mg SLq 5mins X 3 for angina pain. · 12 lead EKG for c/o chest pain or change in rhythm. · Nasal O2 for SaO2 <90% or symptoms warranted. · Blood sugar monitoring for Hyper/Hypoglycemia symptoms.

## 2019-04-12 NOTE — CARE COORDINATION
[facility] Transitions Follow Up Call- final call     2019    Patient: Jannette Cruz  Patient : 1940   MRN: [id number]    Reason for Admission: GI hemorrhage, CHF  Discharge Date: 3/25/19   RARS: Readmission Risk Score: 27  CMRS 15       Spoke with: Janis Gross     Call to pt who states he is having his good days and bad days. States he gets tired from time to time   States CHF clinic is going well  Denies needs  Writer informs this is final ([facility]) phone call- v/u. Encouraged call writer/ [facility] or providers if questions or concerns- v/u. Episode closed      Care Transitions Subsequent and Final Call    Subsequent and Final Calls  Do you have any ongoing symptoms?:  No  Have your medications changed?:  No  Do you have any questions related to your medications?:  No  Do you currently have any active services?:  No  Do you have any needs or concerns that I can assist you with?:  No  Identified Barriers:  Lack of Education  Care Transitions Interventions  Other Interventions:             Follow Up  Future Appointments   Date Time Provider Laura Franco   4/15/2019 10:00 AM [place] CARD REHAB THERAPIST 3 [facility] [place] [facility]   2019 10:00 AM [place] CARD REHAB THERAPIST 3 [facility] [place] [facility]   2019  9:30 AM SCHEDULE, HEART FAILURE DEPARTMENT [facility] C[facility] [place]   2019 10:00 AM [facility] CARD REHAB THERAPIST 3 [facility] [place]   2019 10:00 AM [place] CARD REHAB THERAPIST 3 [facility] [place] [facility]   2019 10:00 AM [place] CARD REHAB THERAPIST 3 [facility]   2019 10:00 AM [place] CARD REHAB THERAPIST 3 [facility] [place] [facility]   2019 10:00 AM [place] CARD REHAB THERAPIST 3 [facility] C[facility] [place] [facility]   2019  9:30 AM MD Jia Najera [facility]   5/15/2019  7:00 AM MD JOESPH Miller [facility]   2019 10:30 AM [facility] ECHO ROOM [facility] JOESPH [facility] E[facility] [place]   2019  1:30 PM MD Jia Najera W       Jd Louie RN

## 2019-04-15 NOTE — PROGRESS NOTES
Phase II Cardiac Rehab Individualized Treatment Plan-Initial     Patient Name: Dee Jones  Date of Initial Assessment: 4/15/2019  ACCOUNT #: [de-identified]  Diagnosis: HEART FAILIURE   Onset Date: 3/19/19  Referring Physician: DR. Alex Black  Risk Stratification: MODERATE  Session Number: 1   EXERCISE    Stages of Change:   ? pre-contemplation   ? Action   ? Contemplate   ? Maintainence   X Prep   ? Relapse          Exercise Prescription:  Mode: X TM X UBE X STP ? EL ? R  Frequency: 3 DAYS PER WEEK  Duration: 31-60 MINUTES  Intensity: 1.3-1.8 METS  Target HR 72-84    Maximum   Plan/Goal: Increase 1-2 levels/week or 1-2 min/week to achieve target HR and RPE   11-13. ? Angina with Exertion    X Resistance Training  introduce 8-12 bilateral UE and LE progressive resistance exercises at 1-3 sets per lift, on 2-3 non-consecutive days using weights/ YELLOW therabands AND WTS TO 8-24 # for 8-15 reps to progressive overload by increasing resistance once reps progressed to at least 15 reps on at least 2 occasions     Hypertension:  X Yes  ?  No  Resting BP: 132/68  Peak Exercise BP: 146/66  BP Meds: HYDRalazine 100 MG 3 TIMES DAILY, 25 mg cozaar nightly, 12.5 mg corgeg 1 tab daily,     Intervention:  Home Exercise:  Type: WALKING  Duration: 30-60 MINUTES  Frequency: 2-3 DAYS PER WEEK   X Resistance Training introduce 8-12 bilateral UE and LE progressive resistance exercises at 1-3 sets per lift, on 2-3 non-consecutive days using weights/ YELLOW therabands AND WTS TO 8-24 # for 8-15 reps to progressive overload by increasing resistance once reps progressed to at least 15 reps on at least 2 occasions    Education:   X Equipment [place]  X Self pulse   X Proper use weights/therabands   X S/S to report  X Low Na Diet    X Warm up/ Cool down  X BP Medication    X RPE Scale   X Understand BP   X Ex Safety   X Exercise specialist class-Home Exercise       Target Goal:   -Individual Exercise Plan  -BP<140/90 or <130/80 if DM -Aerobic active 30 + minutes 5-7 days per week    Nutrition    Stages of Change:   ? pre-contemplation   ? Action   ? Contemplate   ? Maintainence   X Prep   ? Relapse    Lipids: Total Cholesterol: 154  Triglycerides: 326  HDL: 41  LDL: 48  Lipid Meds:  600 mg gemfibrozil, 40 mg simvastatin    Diabetes:  X Yes  ? No  FBS: 66  HbA1c: 6.2  Diabetes Medication: HUMULIN 70/30 50 UNITS IN AM AND 40 UNITS IN PM  X Monitor BS at home   Frequency?: DAILY    Weight Management:  Last Weight:  193LB  Height: 5'6\"  BMI: 31.2  Wt Goal: 1-2 lbs/wk  Alcohol:   Social History     Substance and Sexual Activity   Alcohol Use Yes    Alcohol/week: 0.0 oz    Binge frequency: Less than monthly     Diet Assessment Tool: Food Diary  Special Diet: Special Diet:  1,800 Kcal / day, 2 GM Na+, 30% total fat, <10% saturated fat, 25-35 GM fiber, cholesterol reduced, balanced nutrition plan to be promoted and taught in rehab     Intervention:   ? Dietitian Consult       X Nurse/Patient Discussion     X Diet Class           ? Referred to Diabetes Education     Education:  X S&S hypo/hyperglycemia  X Low fat/low cholesterol diet  X Weight loss methods      X Relate Diabetes/CAD     X Eating heart healthy handout    Target Goal:  -LDL-C<100 if triglycerides are > 200  -LDL-C < 70 for high risk patients  -HbA1c < 7%  -BMI < 25   Education    Stages of Change:    ? pre-contemplation   ? Action   ? Contemplate   ? Maintainence   X Prep   ? Relapse    Learning Barriers:   ? Speech   ? Cognitive   ? Literacy   ? Visions   ? Hearing    X Ready Learn    Knowledge test score: 100%      Family support: X Yes  ?  No    Tobacco use:   Social History     Tobacco Use   Smoking Status Former Smoker    Packs/day: 0.10    Years: 8.00    Pack years: 0.80    Types: Cigarettes    Last attempt to quit: [place] Years since quittin.3   Smokeless Tobacco Never Used   Tobacco Comment    Quit 42 years ago       Intervention:  ? Referred to smoking cessation counselor     ? Individual education and counseling  ? Tobacco adjunct  ? Informed of education class schedule     Education:   X Risk Factors/Modifications  X Psychological aspects  X Angina    X Medications  X CHF      X Cardiac A&P    Target Goal:  -Complete cessation of tobacco use (if applicable)  -Continued risk factor modifications  -Recognizing signs/symptoms to report  -Proper use of meds    Psychosocial  Stages of Change:    ? pre-contemplation   ? Action   ? Contemplate   ? Maintainence   X Prep   ? Relapse    Psychosocial Test:  Tool Used: [employer] Quality of Life  Score: 27.9  Depression screening score: 2    Intervention:   ? Psych Consult/  X Uses stress management skills    ? Physician Referral    X Stress management class  Medications:     Education:    X Coping Techniques   X Relaxation techniques   X S/S of Depression    Target Goal:  -Assess presence or absence of depression using a valid screening tool. -Maximize coping skills.  -Positive support system. Preventative Medication:   X Aspirin       X Beta Blockade      X Statin or other lipid lowering agent     X Clopidogrel   X ACE Inhibitor   X Other anticoagulation medications     Fall Risk assess: X Yes  ? No MODERATE FALL RISK  Assistive Device:   X Cane  ? Kathlgrise Lynch ? Wheel Chair  ? Gait belt    Patient/Program goal:   \"HAVE THE CONDITIONING TO GARDEN, WALK, AND GENERALLY GET BETTER. \"    DEVELOP  STRATEGIES TO PREVENT EXACERBATIONS OF HEART FAILURE    TO NOT GAIN MORE THAN 2 LBS IN A DAY AND 5 LBS IN A WEEK OF WATER WEIGHT     -increased stamina/strength to 30-50 total exercise by increasing 1-2 level/wk and 1-2   min/wk  to achieve THR and RPE 11-15 / INCREASE AVG CARDIO FC BY AT LEAST 0.5-1.0 MET IN THE NEXT 30 DAYS AND TOLERATE >31-45 MIN W/IN EX RX TARGETS     DECREASED DYSPNEA AND FATIGUE WITH EXERTION     BODY WT STAYING W/IN 1 -2 LB VARIANCE PER DAY     -introduce 8-12 bilateral UE and LE progressive resistance

## 2019-04-17 NOTE — CARE COORDINATION
Ambulatory Care Coordination Note  4/17/2019  CM Risk Score: 15  Isaiah Mortality Risk Score: 31    ACC: Delicia Burnett, RN    Summary Note:     Reason For Call Today: Follow up attempt  CHF, patient following with CHF Clinic, went to cardiac rehab today.   -Follow up weights, blood sugars, follow up further congestion;  -follow up on repeat labs ordered prior to CHF clinic next week 04/09/19     Reason Patient is in Care coordination: recent admission, CHF    Care Coordination Plan of Care: This nurse Care Coordinator will attempt f ollow up to patient later this week if do not hear back from patient/patient's spouse.

## 2019-04-19 NOTE — CARE COORDINATION
stable           Diabetes:  -Reports, \"they are running good\"  -Denies any concerns with his blood sugars\"  -patient to continue to monitor.  -Most recent A1C controlled  Lab Results   Component Value Date    LABA1C 6.2 (H) 04/02/2019     Lab Results   Component Value Date     04/02/2019         Intervention Per Care Coordinator Today: Reviewed Blood sugar goals, A1C at goal with recent A1C check    Assessment Completed today:    Diabetes Assessment    Medic Alert ID:  No  Meal Planning:  Avoidance of concentrated sweets   How often do you test your blood sugar?:  Daily   Do you have barriers with adherence to non-pharmacologic self-management interventions? (Nutrition/Exercise/Self-Monitoring):  No   Have you ever had to go to the ED for symptoms of low blood sugar?:  No       No patient-reported symptoms   Do you have hyperglycemia symptoms?:  No   Do you have hypoglycemia symptoms?:  No   Blood Sugar Monitoring Regimen:  Morning Fasting   Blood Sugar Trends:  No Change       and   General Assessment    Do you have any symptoms that are causing concern?:  Yes  Progression since Onset:  Gradually Improving  Reported Symptoms:  Congestion         Recent appointments Reviewed (PCP/Specialist): 04/02/19 Dr. Molly Fong has been monitored closely in cardiac rehab    Any ED visits or Hospitalizations since last Call? · No    Medications Reviewed with  today:  · Patient  verbalizes that he has all of his medications. · Patient denies any questions. · Does patient have any cost issues with medications No, denies. Zone Management tool reviewed today: Yes, Diabetes, CHF    Reviewed social needs/Home Needs if any:     · Does patient have enough food? Yes,   · Does patient have transportation to appointment/store/pharmacy, etc?Yes,     · Does patient need any help in the home?no, denies    ·  Is patient independent with ADL's/IADL'S? yes    Reviewed Upcoming follow up appointments with   · 04/22/19 cardiac rehab:    Goals reviewed. Patient to continue to work to improve:   Goals        Care Coordination     Conditions and Symptoms      I will schedule office visits, as directed by my provider. I will keep my appointment or reschedule if I have to cancel. I will notify my provider of any barriers to my plan of care. I will follow my Zone Management tool to seek urgent or emergent care. I will notify my provider of any symptoms that indicate a worsening of my condition. Patient to monitor blood sugars closely. Recommend daily weights first thing in the morning. Patient, \"checking weights only 1-2 weekly (encourage daily)  Patient to follow through with Cardiology orders/CHF Clinic. Barriers: overwhelmed by complexity of regimen  Plan for overcoming my barriers: patient to follow with care coordinator, and follow closely with cardiology  Confidence: 7/10  Anticipated Goal Completion Date: 05/01/19                Education Reviewed with patient today: See Education Module. · Reviewed plan of care with patient. Patient denies any questions today. · Patient to reach out to care coordinator at [phone] with questions. · Reminded patient of walk in care clinic availability/hours; and when to utilize the facility if MD office not available. Follow-up care is a key part of your treatment and safety. Be sure to make and go to all appointments, and call your doctor if you are having problems. It's also a good idea to know your test results and keep a list of the medicines you take. Patient  encouraged to contact PCP office for any questions or concerns. patient verbalizes understanding. Care Coordinator Plan of Care: This nurse CC will plan to follow up with patient in 2-3 weeks regarding weights, blood pressures, follow up cardiac rehab progression.                  Care Coordination Interventions    Program Enrollment:  Complex Care  Referral from Primary Care Provider:  Yes  Suggested Interventions and Liz Mauricio  Cardiac Rehab:  Completed (Comment: patient going to [facility] CHF clinic)  Home Health Services:  Declined (Comment: \"offered post hospital, feels he is doing okay\")  Meals on Wheels:  Declined  Medication Assistance Program:  Declined  Medi Set or Pill Pack:  Declined  Specialty Services Referral:  Completed (Comment: Patient following with Cardiology)  Transportation Support:  Declined  Zone Management Tools:  Completed (Comment: mailed to patient CHF and Diabetes zones)           Prior to Admission medications    Medication Sig Start Date End Date Taking? Authorizing Provider   Blood Glucose Monitoring Suppl (ONE TOUCH ULTRA MINI) w/Device KIT Use to test sugar twice daily 4/9/19   Josiane Madrigal MD   aspirin 81 MG tablet Take 81 mg by mouth daily    Historical Provider, MD   losartan (COZAAR) 25 MG tablet Take 1 tablet by mouth nightly 3/26/19   Genell Cheadle, MD   carvedilol (COREG) 12.5 MG tablet Take 1 tablet by mouth 2 times daily 3/26/19   Genell Cheadle, MD   ONE TOUCH ULTRASOFT LANCETS MISC Test blood sugar twice daily 3/26/19   Josiane Madrigal MD   blood glucose test strips (ASCENSIA AUTODISC VI;ONE TOUCH ULTRA TEST VI) strip One touch ultra strips.  Test blood sugar BID,DIAGNOIS:SM TYPE II 3/26/19   Josiane Madrigal MD   sodium bicarbonate 650 MG tablet Take 2 tablets by mouth 2 times daily 3/25/19   Chelsey NEVES Blood, DO   bumetanide (BUMEX) 1 MG tablet Take 1 tablet by mouth daily 3/26/19   Chelsey NEVES Blood, DO   NIFEdipine (PROCARDIA XL) 60 MG extended release tablet TAKE 1 TABLET BY MOUTH TWICE DAILY 3/2/19   Josiane Madrigal MD   HUMULIN 70/30 (70-30) 100 UNIT/ML injection vial INJECT 50 UNITS EVERY MORNING AND INJECT 40 UNITS EVERY EVENING 3/2/19   Josiane Madrigal MD   hydrALAZINE (APRESOLINE) 100 MG tablet TAKE 1 TABLET BY MOUTH THREE TIMES DAILY 1/29/19   Historical Provider, MD   isosorbide mononitrate (IMDUR) 60 MG extended release tablet TAKE 1 TABLET BY MOUTH TWICE DAILY 2/1/19   Historical Provider, MD   Insulin Syringe-Needle U-100 (B-D INS SYR ULTRAFINE 1CC/30G) 30G X 1/2\" 1 ML MISC Use 2 daily   DX: E11.9 12/5/18   Michael Geiger MD   gemfibrozil (LOPID) 600 MG tablet Take 600 mg by mouth daily    Historical Provider, MD   famotidine (PEPCID) 20 MG tablet TAKE 1 TABLET BY MOUTH DAILY. 11/19/18   Michael Geiger MD   simvastatin (ZOCOR) 40 MG tablet TAKE 1 TABLET BY MOUTH NIGHTLY 11/19/18   Michael Geiger MD   clopidogrel (PLAVIX) 75 MG tablet TAKE 1 TABLET BY MOUTH DAILY. Patient taking differently: TAKE 1 TABLET BY MOUTH DAILY.  As of 04/02/2019 will be on HOLD 9/10/18   Michael Geiger MD   fluticasone [facility]) 50 MCG/ACT nasal spray 1 spray by Nasal route daily Both nostrils daily 6/29/18   Michael Geiger MD   Blood Pressure Monitor MISC Please dispense blood pressure monitor per patient's insurance 5/22/18   Philippe Yu, DO   ferrous sulfate 325 (65 FE) MG tablet Take 325 mg by mouth 2 times daily     Historical Provider, MD   Ascorbic Acid (VITAMIN C) 500 MG tablet Take 500 mg by mouth daily    Historical Provider, MD   Multiple Vitamins-Minerals (MULTIVITAMIN ADULT PO) Take 1 tablet by mouth daily     Historical Provider, MD       Future Appointments   Date Time Provider Laura Franco   4/22/2019 10:00 AM [address] [facility] THERAPIST [facility]   4/24/2019 10:00 AM [facility] CARD REHAB THERAPIST [facility] CARD [facility]   4/26/2019 10:00 AM [facility] CARD REHAB THERAPIST 3 [facility]ARD [facility]   4/29/2019 10:00 AM [facility] CARD REHAB THERAPIST 3 [facility]   5/2/2019  9:30 AM MD Elizabeth Wilson [facility]   5/15/2019  7:00 AM MD JOESPH Carlisle [facility]   6/12/2019 10:30 AM [facility]CHO [facility]   6/20/2019  1:30 PM MD Elizabeth Wilson [facility]

## 2019-04-22 NOTE — PROGRESS NOTES
Chief Complaint:    Fluid weight gain at the end of last week (resolved today'  Fatigue    Visit Diagnosis:    Systolic heart failure with LVEF 20%  Cardiomegaly  Small pleural effusions  Shortness of breath  Hypertension  Recent GI bleed  Chronic kidney disease    Objective:  Admitted to ED here on 3/19/19 c/o increased dyspnea X 1 month and GI bleed / was in heart failure had small pleural effusions / renal reserve further reduced / LVEF further reduced to 20%   Pre-visit medications include Bumex 1 mg, once daily; losartan 25 mg, nightly; carvedilol 12.5 mg, BID; nifedipine 60 mg, BID; Plavix 75 mg, once daily; hydralazine 100 mg, three times daily; 325 mg, once daily; Zocor 40 mg, qHS; sodium bicarbonate 650 mg, 2 tablets--two times daily  Serum creatinine very similar to last visit to 2.05 from last check at Dr. Kateryna Syed office (2.16) and GFR today remains 32, and potassium is 4.9  [place] lungs are CTA, bilaterally but diminished in both bases / exertional dyspnea seen and measured to improve per dyspnea scale in cardiac rehab  Body weight has gradually gone up from on 4/11/19 SN 146.6 to 199.2 lb today / Home weight dropped from a 3 lb spike on 4/19/19 back to 197 lb today  HR 74  AND REGULAR, /52 in Lt arm and 130/52 in the Rt, SPO2 94% ON RA, RR 16  Bilateral pedal and ankle edema are 1+, compression socks are on, and bilateral calves and thighs are not edematous  Abdomen rounded, soft, and non-tender  Tongue slightly moist and no JVD present    Subjective:  Pt c/o fatigued after cardiac rehab sessions but is tolerating 35 min of cardio exercise and 10 min AROM and stretching  Pt feels better today after c/o \"bad head cold\" on 4/19  Pt states he feels some less short of breath with walking  Reports walking around in the house now several days per week  Overall expresses satisfaction over his progress    Progress Narrarive:   This patient arrives for heart failure management and cardiac rehab session as Assessment finding indicate continued euvolemia and cardiac stability. No medication or plan changes are made today. A HF Clinic follow-up interval will be determined after he sees Dr. Bharati Esquivel for OV with lab work preceding. AVS is printed and reviewed. Malcolm Bathe voices understanding of all discussed. Patient Instructions:  1. No changes to medications today    2. Have daughter or spouse apply ACE wraps to lower legs if going to be up on feet extended periods. 3.  Continue to use you compression socks as instructed (Good job!)    4. Next follow-up will be with Dr. Bharati Esquivel on 5/2/19 with CBC and CMP prior. 5.  Nice job attending regularly in cardiac rehab! I have read and agree with all of the above.   Juhi Rider MD

## 2019-04-26 NOTE — TELEPHONE ENCOUNTER
Pt stopped in office after rehab   Continues to have pardeep leg and ankle  Edema no change since seen per   Pooja Peterson but pt wanting something done  Talked with Dr Vitor Bray   To increase bumex to bid for 3 days  Pt has appt next week with labs.

## 2019-04-29 NOTE — PROGRESS NOTES
This patient arrives for cardiac rehab after having Bumex increased from 1 mg, once daily to 1 mg, BID on 4/26/19 per Dr. Praveen Durán. He was instructed by Dante Farmer to do this for 3 days  This patient was last reassessed in HF Clinic on 4/22/19. At that time Mr. Aleisha Washington reported having had taken his Bumex twice daily one day to reverse 3 lb weight gain. Body weight was stable here at 199.2 lb and 197 lb at home. He has had gradually increasing weight in cardiac rehab from 196.8 to 199.2 lb d/t improved appetite as he continued purportedly feeling better. Serum creatinine remained some elevated but improved from 4/2/19 and stable from 4/11/19. Assessment finding indicated continued euvolemia and cardiac stability. No medication or plan changes were made today. Today Aleks Otoole reports continued rt > lt leg swelling today whereby the rt is about at 2+ and the left about 1+. Likewise his body weight is up from 199.2 to 203 lb here today and is purportedly 199 lb at home. Dr. Praveen Durán is notified and gives instruction for Mr. Nunes to continue taking Bumex, 1 mg BID until being seen for OV with Dr. Praveen Durán on 5/2/19. Aleks Otoole voices understanding.

## 2019-05-02 NOTE — PROGRESS NOTES
Ov Dr Zoë Hurst for one month follow up  Has had a total of 12 pounds since April 12   Per dtg   Has been on 2 tablet on bumex since 04/26  Still having edema  Pt states abd feel little bloated   Bedside echo done      Will set up for MUGA Scan for Cardiomyopathy   And to evaluate for ICD    HOLD LOSARTAN     ADD CYMBALTA (DULOXETINE) 20MG ONE TABLET DAILY '  ADD ALDACTONE(SPIRONOLACTONE) 25 MG ONE TABLET DAILY     DECREASE SODIUM BICARBONATE TO ONE TABLET  TWICE A DAY     WILL FOLLOW UP IN ONE WEEK WITH BMP PRIOR  TO VISIT WITH MARISSA/SHANON HERE IN OFFICE

## 2019-05-02 NOTE — LETTER
He has been having difficulty sleeping and he does feel more lethargic, which may be an element of depression. MEDICATIONS:  Vitamin C 500 mg daily, aspirin 81 mg daily, Bumex 1 mg b.i.d., Coreg 12.5 mg b.i.d., Pepcid 20 mg daily, iron 5 grains b.i.d., Flonase 50 mcg daily, Lopid 600 mg daily, insulin 70/30 units 50 units in the morning and 40 units in the evening, hydralazine 100 mg t.i.d., Imdur 60 mg b.i.d., Cozaar 25 mg nightly, Procardia XL 60 mg b.i.d., Zocor 40 mg daily, sodium bicarb 2 tablets b.i.d. PHYSICAL EXAMINATION:   VITAL SIGNS:  His blood pressure was 124/50 with a heart rate of 72 and regular. Respirations were 18. O2 saturation 95%. Weight 205 pounds. GENERAL:  He is a pleasant 61-year-old gentleman. Denied pain. He was oriented to person, place, and time. Answered questions appropriately. SKIN:  No unusual skin changes. HEENT:  The pupils are equally round and reactive to light and accommodation. Extraocular movements were intact. Mucous membranes were dry. NECK:  No JVD. Good carotid pulses. No bruits. No lymphadenopathy or thyromegaly. CARDIOVASCULAR EXAM:  S1 and S2 were normal.  No S3 or S4. Soft systolic blowing type murmur. No diastolic murmur. PMI was normal.  No lift, thrust, or pericardial friction rub. LUNGS:  Quite clear to auscultation and percussion. ABDOMEN:  Soft and nontender. Good bowel sounds. EXTREMITIES:  Good femoral pulses. Good pedal pulses. He had 3 to 4+ edema bilaterally. Skin was warm and dry. No calf tenderness. Nail beds pink. Good cap refill. NEUROLOGIC EXAM:  Unremarkable. PSYCHIATRIC EXAM:  Unremarkable. LABORATORY DATA:  From 05/02/2019, sodium 142, potassium 4.3, BUN 49, creatinine 2.22, GFR was 29. ALT was 8, AST was 13. White count 6.7, hemoglobin 8.7 with a platelet count [phone]. IMPRESSION:  1. Severe coronary artery disease.   2.  Deterioration in his EF to 20% on echocardiogram on 03/25/2019, at [facility]. [place]'s, previously been in the 40% range, which I think is secondary to idiopathy cardiomyopathy and to his hypertension. 3.  Severe CAD with bypass surgery in 2008, at [address] with LIMA to the LAD, vein graft to the OM, and vein graft to the right coronary artery. 4. Inferior myocardial infarction on 09/15/2012, with a catheterization by Dr. Joann Rothman showing occluded right coronary artery and occluded vein graft to the right coronary artery, suboccluded OM with 95% disease of the vein graft to the OM and occluded LAD with patent LIMA to the LAD, EF 30% with angioplasty of the vein graft to the OM placing 3.5 x 32 and 3.5 x 24 mm bare-metal stents. 5.  Catheterization on 10/05/2018, transferred to [facility] with catheterization on 10/09/2018, by Dr. Nataliya Borjas that showed occluded circumflex and occluded right coronary artery and occluded LAD with an occluded vein graft to the right coronary artery, patent LIMA to the LAD and a patent bypass graft to the circumflex, therefore unchanged from 2012. 6.  Hyperlipidemia, under good control. 7.  Insulin-dependent diabetes. 8.  Renal insufficiency, creatinine now in the 2.15 range. 9.  Anemia, currently still at 8.7, an unknown etiology. 10.  3 to 4+ edema in both lower extremities. 11.  Depression manifested by difficulty sleeping. PLAN:  1. Stop Cozaar because of the risk of hyperkalemia. 2.  Start Aldactone 25 mg daily because of his edema. 3.  Add Cymbalta 20 mg daily for depression and difficulty sleep. 4.  Decrease sodium bicarbonate to 1 tablet b.i.d. (he was on 2 tablets b.i.d.) because of his edema. 5.  Neftaly Braswell will see him in 1 week and I will see him with him for another chem-7 to go over the MUGA scan to see if he qualifies for an ICD (if his ejection fraction is less than 35%). We will also check his edema and a chem-7 to look at his BUN and creatinine and potassium with the addition of Aldactone. DISCUSSION:  Mr. Redgie Severance continues to struggle. He still has significant edema in both lower extremities. He is still fatigued; although, he feels it is getting slightly better in cardiac rehab. He is on Bumex 1 mg b.i.d. and I think it is reasonable to add Aldactone 25 mg daily. He had been on this in 2016; however, his edema had resolved and therefore, it was taken off. Because of the addition of Aldactone, I am concerned about hyperkalemia with his renal insufficiency. Therefore, I will hold losartan and see the result of spironolactone on his potassium and renal function. I will revaluate with a MUGA to see the ejection fraction. If his EF is less than 35%, then he would qualify for an ICD. He unfortunately does not have left bundle-branch block and therefore would not qualify for biventricular ICD if necessary. Thank you very much for allowing me the privilege of seeing Mr. Redgie Severance. If you have any questions on my thoughts, please do not hesitate to contact me.     Sincerely,        Ella Smith    D: 05/02/2019 12:20:50     T: 05/02/2019 14:14:53     [id number]  Job#: [id number]   Doc#: [id number]

## 2019-05-06 NOTE — PROGRESS NOTES
Aníbal Stauffer M.D. [address] [place] [address]  [phone]        May 2, 2019        Kirsten Stone MD  [address] [address]    RE:   Jose F Bolden  :  1940    Dear Dr. Ayesha Heimlich:    CHIEF COMPLAINT:  1. Cardiomyopathy. 2.  Edema in both lower extremities. 3.  History of severe LV dysfunction. HISTORY OF PRESENT ILLNESS:  I had the pleasure of seeing Mr. Juliano Diane in our office on 2019. I trust you received my full H and P from 2019. In brief, he presented to the hospital on 10/05/2018, with severe hypertension with blood pressure 210/100, was transferred to [facility].  His EF had declined and is at 25%. A catheterization by Dr. Annia Hogan showed occluded circumflex with a patent vein graft to the circumflex, the right coronary artery and vein graft to the right coronary artery was occluded, LIMA was patent to the LAD, EF was 30%, medical therapy recommended. This was unchanged from catheterization of . He does have anemia with hemoglobin remaining in the 8.7 range. He has not had GI workup as of yet. We have been following his shortness of breath and his cardiomyopathy. He has significant edema in both lower extremities. He also has renal insufficiency with a creatinine in the 2.2 range, which has been stable. He has been in cardiac rehab. He still remains quite short of breath and he has had loss of energy; although, it seems to be improving slightly in cardiac rehab. He has been battling with his water weight and his weight is up approximately 6 pounds. He has had no chest pain or chest discomfort. He continues to wear his LifeVest.    He has had no syncope or near syncope. He does have significant 3 to 4+ edema in both lower extremities. He has mild ascites; although, it is better than it has been previously.     He has been having difficulty sleeping and he does feel more lethargic, which may be an element of depression. MEDICATIONS:  Vitamin C 500 mg daily, aspirin 81 mg daily, Bumex 1 mg b.i.d., Coreg 12.5 mg b.i.d., Pepcid 20 mg daily, iron 5 grains b.i.d., Flonase 50 mcg daily, Lopid 600 mg daily, insulin 70/30 units 50 units in the morning and 40 units in the evening, hydralazine 100 mg t.i.d., Imdur 60 mg b.i.d., Cozaar 25 mg nightly, Procardia XL 60 mg b.i.d., Zocor 40 mg daily, sodium bicarb 2 tablets b.i.d. PHYSICAL EXAMINATION:   VITAL SIGNS:  His blood pressure was 124/50 with a heart rate of 72 and regular. Respirations were 18. O2 saturation 95%. Weight 205 pounds. GENERAL:  He is a pleasant 82-year-old gentleman. Denied pain. He was oriented to person, place, and time. Answered questions appropriately. SKIN:  No unusual skin changes. HEENT:  The pupils are equally round and reactive to light and accommodation. Extraocular movements were intact. Mucous membranes were dry. NECK:  No JVD. Good carotid pulses. No bruits. No lymphadenopathy or thyromegaly. CARDIOVASCULAR EXAM:  S1 and S2 were normal.  No S3 or S4. Soft systolic blowing type murmur. No diastolic murmur. PMI was normal.  No lift, thrust, or pericardial friction rub. LUNGS:  Quite clear to auscultation and percussion. ABDOMEN:  Soft and nontender. Good bowel sounds. EXTREMITIES:  Good femoral pulses. Good pedal pulses. He had 3 to 4+ edema bilaterally. Skin was warm and dry. No calf tenderness. Nail beds pink. Good cap refill. NEUROLOGIC EXAM:  Unremarkable. PSYCHIATRIC EXAM:  Unremarkable. LABORATORY DATA:  From 05/02/2019, sodium 142, potassium 4.3, BUN 49, creatinine 2.22, GFR was 29. ALT was 8, AST was 13. White count 6.7, hemoglobin 8.7 with a platelet count 427,509. IMPRESSION:  1. Severe coronary artery disease.   2.  Deterioration in his EF to 20% on echocardiogram on 03/25/2019, at [facility] [address] [facility], previously been in the 40% range, which I think is secondary to idiopathy cardiomyopathy and to his hypertension. 3.  Severe CAD with bypass surgery in 2008, at [address] with LIMA to the LAD, vein graft to the OM, and vein graft to the right coronary artery. 4. Inferior myocardial infarction on 09/15/2012, with a catheterization by Dr. Tim Gonzales showing occluded right coronary artery and occluded vein graft to the right coronary artery, suboccluded OM with 95% disease of the vein graft to the OM and occluded LAD with patent LIMA to the LAD, EF 30% with angioplasty of the vein graft to the OM placing 3.5 x 32 and 3.5 x 24 mm bare-metal stents. 5.  Catheterization on 10/05/2018, transferred to [facility] with catheterization on 10/09/2018, by Dr. Lionel Cervantes that showed occluded circumflex and occluded right coronary artery and occluded LAD with an occluded vein graft to the right coronary artery, patent LIMA to the LAD and a patent bypass graft to the circumflex, therefore unchanged from 2012. 6.  Hyperlipidemia, under good control. 7.  Insulin-dependent diabetes. 8.  Renal insufficiency, creatinine now in the 2.15 range. 9.  Anemia, currently still at 8.7, an unknown etiology. 10.  3 to 4+ edema in both lower extremities. 11.  Depression manifested by difficulty sleeping. PLAN:  1. Stop Cozaar because of the risk of hyperkalemia. 2.  Start Aldactone 25 mg daily because of his edema. 3.  Add Cymbalta 20 mg daily for depression and difficulty sleep. 4.  Decrease sodium bicarbonate to 1 tablet b.i.d. (he was on 2 tablets b.i.d.) because of his edema. 5.  Sarah Randall will see him in 1 week and I will see him with him for another chem-7 to go over the MUGA scan to see if he qualifies for an ICD (if his ejection fraction is less than 35%). We will also check his edema and a chem-7 to look at his BUN and creatinine and potassium with the addition of Aldactone. DISCUSSION:  Mr. Mily Ann continues to struggle. He still has significant edema in both lower extremities.   He is still fatigued; although, he feels it is getting slightly better in cardiac rehab. He is on Bumex 1 mg b.i.d. and I think it is reasonable to add Aldactone 25 mg daily. He had been on this in 2016; however, his edema had resolved and therefore, it was taken off. Because of the addition of Aldactone, I am concerned about hyperkalemia with his renal insufficiency. Therefore, I will hold losartan and see the result of spironolactone on his potassium and renal function. I will revaluate with a MUGA to see the ejection fraction. If his EF is less than 35%, then he would qualify for an ICD. He unfortunately does not have left bundle-branch block and therefore would not qualify for biventricular ICD if necessary. Thank you very much for allowing me the privilege of seeing Mr. Lucero Lechuga. If you have any questions on my thoughts, please do not hesitate to contact me.     Sincerely,        Zenaida Kamara    D: 05/02/2019 12:20:50     T: 05/02/2019 14:14:53     [id number]  Job#: [id number]   Doc#: [id number]

## 2019-05-08 PROBLEM — E16.2 HYPOGLYCEMIA: Status: ACTIVE | Noted: 2019-01-01

## 2019-05-09 NOTE — ED PROVIDER NOTES
[address] COMPLAINT    Chief Complaint   Patient presents with    Hypoglycemia     Pt arrives via EMS d/t low blood sugar of 37 per EMS, D50 given by EMS blood sugar came up to 133;        HPI    Annabel Hernández is a 70 y.o. male who presents to ED by ems following episode of hypoglycemia. He is an insulin-dependent diabetic 2 and fell asleep not eating supper last night at home his wife saw that he was unresponsive and called 911. Patient presents here and while here presents mildly increased work of breathing he denies. He has a history of CHF and life current plan to see us cardiologist tomorrow. [employer] going to be discussing his continuation of life vest or not. He denies any fever or cough or orthopnea.      PAST MEDICAL HISTORY    Past Medical History:   Diagnosis Date    CAD (coronary artery disease)     MI on July 15, 2012    CHF (congestive heart failure) (HCC)     Chicken pox     Chronic kidney disease     CKD stage 3 due to type 2 diabetes mellitus ([address]     [facility] failure ([facility] [address]     Hypertension     Measles     Mumps     Osteoarthritis     Tracheal obstruction 9/2012    trach after intubation for MI    Type II or unspecified type diabetes mellitus without mention of complication, not stated as uncontrolled        SURGICAL HISTORY    Past Surgical History:   Procedure Laterality Date    CARDIAC CATHETERIZATION Left 10/09/2018    Dr[facility]    [facility]    EYE SURGERY Bilateral     Cataract removal    TRACHEOTOMY         CURRENT MEDICATIONS    Current Discharge Medication List      CONTINUE these medications which have NOT CHANGED    Details   sodium bicarbonate 650 MG tablet Take 1 tablet by mouth 2 times daily      bumetanide (BUMEX) 1 MG tablet Take 1 tablet by mouth 2 times daily  Qty: 60 tablet, Refills: 11      DULoxetine (CYMBALTA) 20 MG extended release capsule Take 1 capsule by mouth daily  Qty: 30 capsule, Refills: 11      spironolactone (ALDACTONE) 25 MG tablet Take 1 tablet by mouth daily  Qty: 30 tablet, Refills: 11      famotidine (PEPCID) 20 MG tablet TAKE 1 TABLET BY MOUTH EVERY DAY  Qty: 90 tablet, Refills: 1    Comments: We are requesting this refill to have on file for next month s order.       aspirin 81 MG tablet Take 81 mg by mouth daily      carvedilol (COREG) 12.5 MG tablet Take 1 tablet by mouth 2 times daily  Qty: 60 tablet, Refills: 3      NIFEdipine (PROCARDIA XL) 60 MG extended release tablet TAKE 1 TABLET BY MOUTH TWICE DAILY  Qty: 180 tablet, Refills: 1      HUMULIN 70/30 (70-30) 100 UNIT/ML injection vial INJECT 50 UNITS EVERY MORNING AND INJECT 40 UNITS EVERY EVENING  Qty: 30 mL, Refills: 5    Associated Diagnoses: Type 2 diabetes mellitus with stage 3 chronic kidney disease, with long-term current use of insulin ([facility])      hydrALAZINE (APRESOLINE) 100 MG tablet TAKE 1 TABLET BY MOUTH THREE TIMES DAILY  Refills: 11      isosorbide mononitrate (IMDUR) 60 MG extended release tablet TAKE 1 TABLET BY MOUTH TWICE DAILY  Refills: 5      gemfibrozil (LOPID) 600 MG tablet Take 600 mg by mouth daily      simvastatin (ZOCOR) 40 MG tablet TAKE 1 TABLET BY MOUTH NIGHTLY  Qty: 90 tablet, Refills: 1      fluticasone (FLONASE) 50 MCG/ACT nasal spray 1 spray by Nasal route daily Both nostrils daily  Qty: 1 Bottle, Refills: 4      ferrous sulfate 325 (65 FE) MG tablet Take 325 mg by mouth 2 times daily       Ascorbic Acid (VITAMIN C) 500 MG tablet Take 500 mg by mouth daily      Multiple Vitamins-Minerals (MULTIVITAMIN ADULT PO) Take 1 tablet by mouth daily       Blood Glucose Monitoring Suppl (ONE TOUCH ULTRA MINI) w/Device KIT Use to test sugar twice daily  Qty: 1 kit, Refills: 0    Associated Diagnoses: Type 2 diabetes mellitus with stage 2 chronic kidney disease, with long-term current use of insulin ([facility])      ONE TOUCH ULTRASOFT LANCETS [facility] Test blood sugar twice daily  Qty: 100 each, Refills: 3      blood glucose test strips (ASCENSIA AUTODISC VI;ONE TOUCH ULTRA TEST VI) strip One touch ultra strips. Test blood sugar BID,DIAGNOIS:SM TYPE II  Qty: 100 each, Refills: 3      Insulin Syringe-Needle U-100 (B-D INS SYR ULTRAFINE 1CC/30G) 30G X 1/2\" 1 ML MISC Use 2 daily   DX: E11.9  Qty: 180 each, Refills: 10      clopidogrel (PLAVIX) 75 MG tablet TAKE 1 TABLET BY MOUTH DAILY.   Qty: 90 tablet, Refills: 1      Blood Pressure Monitor MISC Please dispense blood pressure monitor per patient's insurance  Qty: 1 each, Refills: 0    Associated Diagnoses: Type 2 diabetes mellitus with diabetic nephropathy, with long-term current use of insulin (HCC)         STOP taking these medications       losartan (COZAAR) 25 MG tablet Comments:   Reason for Stopping:               ALLERGIES    Allergies   Allergen Reactions    Lisinopril      ACE cough       FAMILY HISTORY    Family History   Problem Relation Age of Onset    Heart Attack Mother     Heart Attack Father     Diabetes type 2  Sister        SOCIAL HISTORY    Social History     Socioeconomic History    Marital status:      Spouse name: None    Number of children: None    Years of education: None    Highest education level: None   Occupational History    None   Social Needs    Financial resource strain: None    Food insecurity:     Worry: None     Inability: None    Transportation needs:     Medical: None     Non-medical: None   Tobacco Use    Smoking status: Former Smoker     Packs/day: 0.10     Years: 8.00     Pack years: 0.80     Types: Cigarettes     Last attempt to quit: 1969     Years since quittin.3    Smokeless tobacco: Never Used    Tobacco comment: Quit 42 years ago   Substance and Sexual Activity    Alcohol use: Not Currently     Alcohol/week: 0.0 oz     Binge frequency: Less than monthly    Drug use: No    Sexual activity: Yes     Partners: Female   Lifestyle    Physical Neck: JVD present. Cardiovascular: Normal rate and regular rhythm. Pulmonary/Chest: Tachypnea noted. He has rales. Abdominal: Soft. He exhibits no distension. Musculoskeletal: He exhibits edema. Neurological: He is alert and oriented to person, place, and time. No cranial nerve deficit. Skin: Capillary refill takes less than 2 seconds. Psychiatric: He has a normal mood and affect. His behavior is normal. Judgment normal.        EKG    Sinus rhythm nonspecific T-wave changes nothing acute found no changes from previous EKG    RADIOLOGY    XR CHEST STANDARD (2 VW)   Preliminary Result   Preliminary report only         XR CHEST PORTABLE   Final Result      There is persistent cardiomegaly, progressive vascular engorgement and diffuse    overt pulmonary edema. There may be a small left pleural effusion.                  Labs  Results for orders placed or performed during the hospital encounter of 05/08/19   Glucose, Whole Blood   Result Value Ref Range    POC Glucose 89 65 - 99 mg/dL   CBC Auto Differential   Result Value Ref Range    WBC 8.6 3.5 - 11.0 k/uL    RBC 3.49 (L) 4.5 - 5.9 m/uL    Hemoglobin 9.6 (L) 13.5 - 17.5 g/dL    Hematocrit 30.0 (L) 41 - 53 %    MCV 86.0 80 - 100 fL    MCH 27.6 26 - 34 pg    MCHC 32.1 31 - 37 g/dL    RDW 16.4 (H) 12.1 - 15.2 %    Platelets 644 856 - 431 k/uL    MPV NOT REPORTED 6.0 - 12.0 fL    NRBC Automated NOT REPORTED per 100 WBC    Differential Type NOT REPORTED     Immature Granulocytes NOT REPORTED 0 %    Absolute Immature Granulocyte NOT REPORTED 0.00 - 0.30 k/uL    WBC Morphology NOT REPORTED     RBC Morphology NOT REPORTED     Platelet Estimate NOT REPORTED     Seg Neutrophils 80 (H) 39 - 75 %    Lymphocytes 8 (L) 13 - 44 %    Monocytes 7 5 - 9 %    Eosinophils % 4 0 - 5 %    Basophils 1 0 - 2 %    Segs Absolute 6.88 (H) 2.1 - 6.5 k/uL    Absolute Lymph # 0.69 (L) 1.0 - 4.8 k/uL    Absolute Mono # 0.60 0.0 - 1.0 k/uL    Absolute Eos # 0.34 0.0 - 0.4 k/uL 144 (H) 65 - 99 mg/dL   Glucose, Whole Blood   Result Value Ref Range    POC Glucose 134 (H) 65 - 99 mg/dL   CBC auto differential   Result Value Ref Range    WBC 8.0 3.5 - 11.0 k/uL    RBC 3.19 (L) 4.5 - 5.9 m/uL    Hemoglobin 9.0 (L) 13.5 - 17.5 g/dL    Hematocrit 27.2 (L) 41 - 53 %    MCV 85.2 80 - 100 fL    MCH 28.1 26 - 34 pg    MCHC 33.0 31 - 37 g/dL    RDW 16.7 (H) 12.1 - 15.2 %    Platelets 878 923 - 874 k/uL    MPV NOT REPORTED 6.0 - 12.0 fL    NRBC Automated NOT REPORTED per 100 WBC    Differential Type YES     Seg Neutrophils 75 39 - 75 %    Lymphocytes 10 (L) 13 - 44 %    Monocytes 11 (H) 5 - 9 %    Eosinophils % 4 0 - 5 %    Basophils 0 0 - 2 %    Immature Granulocytes NOT REPORTED 0 %    Segs Absolute 6.00 2.1 - 6.5 k/uL    Absolute Lymph # 0.80 (L) 1.0 - 4.8 k/uL    Absolute Mono # 0.90 0.0 - 1.0 k/uL    Absolute Eos # 0.30 0.0 - 0.4 k/uL    Basophils # 0.00 0.0 - 0.2 k/uL    Absolute Immature Granulocyte NOT REPORTED 0.00 - 0.30 k/uL    WBC Morphology NOT REPORTED     RBC Morphology NOT REPORTED     Platelet Estimate NOT REPORTED    Basic Metabolic Panel   Result Value Ref Range    Glucose 143 (H) 70 - 99 mg/dL    BUN 60 (H) 8 - 23 mg/dL    CREATININE 2.61 (H) 0.70 - 1.20 mg/dL    Bun/Cre Ratio 23 (H) 9 - 20    Calcium 8.8 8.6 - 10.4 mg/dL    Sodium 138 135 - 144 mmol/L    Potassium 4.5 3.7 - 5.3 mmol/L    Chloride 98 98 - 107 mmol/L    CO2 22 20 - 31 mmol/L    Anion Gap 18 (H) 9 - 17 mmol/L    GFR Non-African American 24 (L) >60 mL/min    GFR  29 (L) >60 mL/min    GFR Comment          GFR Staging NOT REPORTED    Glucose, Whole Blood   Result Value Ref Range    POC Glucose 193 (H) 65 - 99 mg/dL   Glucose, Whole Blood   Result Value Ref Range    POC Glucose 169 (H) 65 - 99 mg/dL   Glucose, Whole Blood   Result Value Ref Range    POC Glucose 150 (H) 65 - 99 mg/dL   EKG 12 Lead   Result Value Ref Range    Ventricular Rate 57 BPM    Atrial Rate 97 BPM    QRS Duration 102 ms    Q-T Interval 420 ms    QTc Calculation (Bazett) 408 ms    P Axis 76 degrees    R Axis 89 degrees    T Axis -110 degrees           Summation      Patient Course: Patient is not hypoxic but does have very and looking chest x-ray difficult patient to assess because he does seem to be having increased work of breathing but he says it is not any more short of breath but he is chronically short of breath discussed with cardiology and hospitalist patient is to be seen tomorrow discussed with family that they want to be discharged home or admitted as an observation for not meeting criteria at this time the patient asked to be placed in observation due to a higher risk of falls with urination at home with been given Lasix also I have a feeling that he could decompensate due to his CHF along with his poor looking chest x-ray. Dr. linares agreed to place patient in observation.     ED Medications administered this visit:    Medications   ipratropium-albuterol (DUONEB) nebulizer solution 1 ampule (0 ampules Inhalation Not Given 5/8/19 2112)   vitamin C (ASCORBIC ACID) tablet 500 mg (500 mg Oral Given 5/10/19 0757)   aspirin EC tablet 81 mg (81 mg Oral Given 5/10/19 0758)   carvedilol (COREG) tablet 12.5 mg (12.5 mg Oral Given 5/10/19 0758)   DULoxetine (CYMBALTA) extended release capsule 20 mg (20 mg Oral Given 5/10/19 0757)   famotidine (PEPCID) tablet 20 mg (20 mg Oral Given 5/10/19 0758)   ferrous sulfate tablet 325 mg (325 mg Oral Given 5/10/19 0757)   fluticasone (FLONASE) 50 MCG/ACT nasal spray 1 spray (1 spray Nasal Given 5/10/19 0758)   hydrALAZINE (APRESOLINE) tablet 100 mg (100 mg Oral Given 5/10/19 0607)   isosorbide mononitrate (IMDUR) extended release tablet 60 mg (60 mg Oral Given 5/10/19 0757)   NIFEdipine (PROCARDIA XL) extended release tablet 60 mg (60 mg Oral Given 5/10/19 0758)   simvastatin (ZOCOR) tablet 40 mg (40 mg Oral Given 5/9/19 2029)   sodium bicarbonate tablet 650 mg (650 mg Oral Given 5/10/19 0757) spironolactone (ALDACTONE) tablet 25 mg (25 mg Oral Given 5/10/19 0758)   sodium chloride flush 0.9 % injection 10 mL (10 mLs Intravenous Given 5/9/19 2038)   sodium chloride flush 0.9 % injection 10 mL (has no administration in time range)   magnesium hydroxide (MILK OF MAGNESIA) 400 MG/5ML suspension 30 mL (has no administration in time range)   ondansetron (ZOFRAN) injection 4 mg (has no administration in time range)   acetaminophen (TYLENOL) tablet 650 mg (650 mg Oral Given 5/10/19 0757)   heparin (porcine) injection 5,000 Units (5,000 Units Subcutaneous Given 5/10/19 0756)   glucose (GLUTOSE) 40 % oral gel 15 g (has no administration in time range)   dextrose 50 % solution 12.5 g (has no administration in time range)   glucagon (rDNA) injection 1 mg (has no administration in time range)   dextrose 5 % solution (has no administration in time range)   insulin lispro (HUMALOG) injection vial 0-6 Units (1 Units Subcutaneous Given 5/10/19 0756)   insulin lispro (HUMALOG) injection vial 0-3 Units (1 Units Subcutaneous Given 5/9/19 2033)   DOBUTamine (DOBUTREX) 1000 mg in dextrose 5 % 250 mL infusion (2.5 mcg/kg/min × 90.7 kg Intravenous Rate/Dose Verify 5/9/19 0910)   LORazepam (ATIVAN) tablet 0.5 mg (0.5 mg Oral Given 5/10/19 0011)   torsemide (DEMADEX) tablet 20 mg (20 mg Oral Given 5/10/19 0815)   zolpidem (AMBIEN) tablet 5 mg (has no administration in time range)   furosemide (LASIX) injection 40 mg (40 mg Intravenous Given 5/8/19 2237)       New Prescriptions from this visit:    Current Discharge Medication List          Follow-up:  Marisela Moreno MD  [address] [place] [id number] [phone]              Final Impression:   1.  Acute systolic congestive heart failure ([facility] [address]               (Please note that portions of this note were completed with a voice recognition program.  Efforts were made to edit the dictations but occasionally words are mis-transcribed.)        Manuel Smith

## 2019-05-09 NOTE — CONSULTS
[place] [address]                                  CONSULTATION    PATIENT NAME: Jalil Ridley                       :        1940  MED REC NO:   [id number]                              ROOM:       9139  ACCOUNT NO:   [de-identified]                           ADMIT DATE: 2019  PROVIDER:     Alma Delia Ryan    CONSULT DATE:  2019    REASON FOR CONSULT:  1.  CHF. 2.  Shortness of breath. 3.  Cardiomyopathy. HISTORY OF PRESENT ILLNESS:  The patient is a pleasant 74-year-old  gentleman with a very extensive complex cardiac history. He had bypass  surgery in [facility] in  with a left internal mammary artery to  the LAD, a vein graft to the OM, and a vein graft to the PDA of the  right coronary artery. On 07/15/2012, he had an inferior myocardial infarction, went to [address] [place] [address], had a catheterization that showed occluded vein graft to the  right coronary artery with an occluded native right coronary artery. He  had 95% disease in the vein graft to the OM, patent LIMA to the LAD, and  had angioplasty of the vein graft to the OM, placing 3.5 x 32 and 3.5 x  24 mm bare-metal stents. His initial EF was 30% and it increased to 40%  to 45%. He presented to the hospital on 10/05/2018 with severe hypertension,  blood pressure of 210/100, and was transferred to [facility].   Echocardiogram showed severe LV dysfunction with severely enlarged left  atrium and left ventricle, moderately enlarged right ventricle, EF was  25%, which was a significant change. He had a catheterization by Dr. Janie Flowers on 10/09/2018 that showed occluded  circumflex with a patent vein graft to the circumflex. The vein graft  to the right coronary artery and the right coronary artery were  occluded. The LIMA was patent to the LAD. EF was 30%,  medical therapy  recommended.   Of note, his catheterization was essentially unchanged  from 10/09/2018 after he had angioplasty of the vein graft to the  circumflex. He has had very labile hypertension, which has been difficult to  control. He came to the emergency room on 03/19/2019 with shortness of breath. His hemoglobin was 8.8 and he was transferred to [address] [place] [address]. [facility]. An  echocardiogram at [address] [place] [address] Teds on 03/20/2019 showed an EF in the 20%  range. PA pressure was estimated at 40. He was not transfused at that time. He does have acute on chronic renal  insufficiency with a creatinine that had increased to 2.25. He was  given a LifeVest, and I have been seeing him in my office. He is somewhat of a difficult historian. He really minimizes his  complaints. He does become short of breath with any activity and in my  office on 04/02/2019, his O2 sat was 94 with activity. He was very  weak. He had bilateral swelling, worse on the right foot and did have a  Doppler ultrasound of the right lower extremity by Dr. Gamal Tony that was  negative for DVT. On 04/02, I stopped his Plavix in view of his anemia and decreased his  aspirin to 81 mg daily. I enrolled him in our CHF Clinic. On 05/02, I saw him in our clinic. His hemoglobin is in the 9.6 range,  which is up from what it had been previously. His creatinine is up  slightly at 2.22. On 05/02, he remained short of breath with any  activity. He was in cardiac rehab but doing some activity, although  very limited. He denied any chest pain or chest discomfort. He does  continue to have 3 to 4+ edema in both lower extremities with mild  ascites, although it is slightly improved. His creatinine increased to 2.22 when I saw him on 05/02. Therefore, I  stopped Cozaar because of risk of hyperkalemia and started Aldactone 25  mg daily because of his edema. We also added Cymbalta 20 mg daily at  that time. He was supposed to see me in my office today.   However, yesterday, he  was found unconscious on the porch and was brought to the emergency  room. He evidently skipped dinner last night and fell asleep on the  porch, and then, the family was unable to arouse him and called EMS. His blood sugar was found to be in the 30s and he responded to D50. He  came to the emergency room. Again, he is a very difficult historian, and he denied any change in his  chronic shortness of breath. Chest x-ray, however, showed diffuse overt  pulmonary edema. He was admitted both for hypoglycemia and because it  appeared on the x-ray that he was in CHF. Of note, I had ordered a MUGA scan, which was done on 05/06, which  showed an EF of 47%, although I am going to do an echocardiogram to see  what it appears to be on an echo as I feel that it would be extremely  unusual for it to increase that amount from March to now. When I see him, he is lying in bed. He appears to be somewhat short of  breath but denies any shortness of breath. He states he feels overall  okay. He denies PND or orthopnea, although again he appears to be short  of breath as he is lying in bed. His oxygen is okay at 95%. His family  is not here; however, in the office when I would see him with his  family, they state that at home he is still very short of breath. On 05/02, before I ended the Aldactone, he had 3 to 4+ edema in both  lower extremities and I had added the Aldactone at that time. He denies chest pain. He has been attempting to exercise in cardiac  rehab, although it has been very difficult. His last session was on  05/06. He exercised 2.4 METs. CARDIAC RISK FACTORS:  Known CAD:  Positive. Prior MI:  Positive. Bypass Surgery:  Positive. Hypertension:  Positive. Diabetes:  Positive. Smoking:  Negative. Other Family Members:  Positive.     MEDICATIONS PRIOR TO ADMISSION:  Sodium bicarbonate 650 mg b.i.d., Bumex  1 mg b.i.d., Cymbalta 20 mg daily, spironolactone 25 mg daily, aspirin  81 mg daily, Coreg 12.5 mg b.i.d., Procardia XL 60 mg daily, Humulin  70/30 daily, Apresoline 100 mg t.i.d., Imdur 60 mg b.i.d., Zocor 40 mg  daily, Lopid 600 mg daily, Flonase 50 mcg daily, iron 5 grains b.i.d. PAST MEDICAL HISTORY:  1. Bypass surgery in 2008. 2.  PTCA in 2012. 3.  Catheterization on 10/09/2018 by Dr. Ibis Calderon, which was essentially  unchanged from 2012. 4.  Idiopathic cardiomyopathy with an EF down to 30% in 10/2018 and an  EF of 20% 03/20/2019.  5.  He has had tracheal obstruction after intubation in 2008. 6.  Eye surgery. 7.  Stage III kidney disease, followed by Dr. Jameel Cazares. 8.  He has diabetes, insulin dependent. FAMILY HISTORY:  Positive for CAD with his father dying at 61 of heart  disease. One sister is alive. SOCIAL HISTORY:  He is 70years old. Does not smoke or drink alcohol. Three girls, 11 grandchildren, 4 great-grandchildren. Quit smoking 46  years ago. Enjoys gardening. He is . His daughter, Marce Pryor,  usually comes with him. REVIEW OF SYSTEMS:  Cardiac as above. Other systems reviewed including  constitutional, eyes, ears, nose and throat, cardiovascular,  respiratory, GI, , musculoskeletal, integumentary, neurologic,  psychiatric, endocrine, hematologic, and allergic/immunologic and are  negative except for what is described above. Again, he is a poor  historian. PHYSICAL EXAMINATION:  VITAL SIGNS:  His blood pressure was 126/56 with a heart rate of 62 and  regular. Respirations were 18. O2 saturation 96%. GENERAL:  He is a pleasant 81-year-old gentleman who appeared to be  short of breath, although he denied short of breath. He was oriented to  person, place and time. He knew that he was found on a porch  unconscious but really minimizes his symptoms. SKIN:  No unusual skin changes. HEENT:  The pupils are equally round and intact. Mucous membranes were  dry. NECK:  No JVD. Good carotid pulses. No carotid bruits.   No  lymphadenopathy or thyromegaly. CARDIOVASCULAR EXAM:  S1 and S2 were normal.  No S3 or S4. Soft  systolic blowing type murmur. No diastolic murmur. PMI was normal.  No  lift, thrust, or pericardial friction rub. LUNGS:  Had diffuse wheezes and crackles bilaterally. ABDOMEN:  Soft and nontender. Somewhat protuberant. I could not feel  liver, spleen or aorta. EXTREMITIES:  Good femoral pulses. Good pedal pulses. He had 2 to 3+  edema bilaterally. Skin was warm and dry. No calf tenderness. Nail  beds pink. Good cap refill. PULSES:  Bilateral symmetrical radial, brachial and carotid pulses. No  carotid bruits. Good femoral and pedal pulses. NEUROLOGIC EXAM:  Within normal limits. PSYCHIATRIC EXAM:  Within normal limits. LABORATORY DATA:  Sodium 142, potassium 4.1, BUN 60, creatinine 2.36. His white count was 8.6, hemoglobin 9.6 with a platelet count of  714,928. ProBNP was 17,000. EKG showed sinus rhythm with second-degree AV block, _____ seg point  with nonspecific ST changes. IMPRESSION:  1. CHF manifested mainly on chest x-ray as he minimizes symptoms but  associated also with crackles and pedal edema along with some ascites. 2.  Cardiomyopathy with a combination of ischemic and idiopathic, with  his last echocardiogram showing an EF of 20% on 03/20/2019 at [facility].  [facility]. 3.  Bypass surgery in 2008 at [facility]. [facility] with KAPOOR to the LAD, a vein  graft to the OM, and a vein graft to right coronary artery. 4. Inferior myocardial infarction on 07/15/2012, with a catheterization  by Dr. Babar Ansari showing occluded right coronary artery and occluded vein  graft to the right coronary artery. He had suboccluded OM with 95%  disease in the vein graft to the OM, occluded LAD with a patent LIMA to  the LAD, and an EF of 30%, with angioplasty of the vein graft to the OM  placing 3.5 x 32 and 3.5 x 24 mm bare-metal stents. 5.  EF in the 40% to 45% range.   6.  Hypertensive urgency on 10/05/2018 at [facility] with bilaterally. He does still have 2 to 3+ edema. I think this is  a good time to try to diurese as much as possible watching his BUN and  creatinine carefully. Again, he minimizes his symptoms. I think he has, in general, been  doing slightly better as he has been in cardiac rehab, although activity  is still very limited. Hopefully, we can discharge him within 1 to 2 days. Thank you very much for allowing me the privilege of seeing the patient. If you have any questions on my thoughts, please do not hesitate to  contact me.         Dano Hinojosa    D: 05/09/2019 7:23:14       T: 05/09/2019 9:31:16     [id number]  Job#: [id number]     Doc#: [id number]    CC:  Yana Foster

## 2019-05-09 NOTE — CARE COORDINATION
Patient currently admitted to [facility]: This nurse Care Coordinator will follow up with patient when update from CTC staff.

## 2019-05-09 NOTE — PROGRESS NOTES
Met with Patient this a.m. during quality flow rounding. He is a 70year old , white male admitted for Hypoglycemia. He is alert and oriented, pleasant and cooperative with this assessment. Patient resides with his wife in [facility]. Has 3 adult children. His daughter lives across the street from him and provides support as she is able. Patient uses a cane. Both he and his wife drive. He is retired from [employer]. Uses no outside community resources or services at this time. PCP is Dr. Cheyenne Clements. Patient denies difficulty in affording his medications. Plan for Patient is to return home with his wife when he is medically stable. He has no medical directives but is open to learning more about these documents. Educational brochure provided at this time. No anticipated unmet needs or concerns noted by Patient. LSW to monitor and assist as needs arise.     Len Grove  5/9/2019

## 2019-05-09 NOTE — PROGRESS NOTES
BG 52. Pt alert and oriented. Given snack of 8oz juice, PB crackers, and package of cookies. Family at bedside. Will continue to monitor.

## 2019-05-09 NOTE — H&P
History:        Procedure Laterality Date    CARDIAC CATHETERIZATION Left 10/09/2018    Dr. [facility]    [facility]    EYE SURGERY Bilateral     Cataract removal    TRACHEOTOMY         Medications Prior to Admission:    Prior to Admission medications    Medication Sig Start Date End Date Taking?  Authorizing Provider   sodium bicarbonate 650 MG tablet Take 1 tablet by mouth 2 times daily 5/2/19  Yes Neelam Bejarano MD   bumetanide (BUMEX) 1 MG tablet Take 1 tablet by mouth 2 times daily 5/2/19  Yes Neelam Bejarano MD   DULoxetine (CYMBALTA) 20 MG extended release capsule Take 1 capsule by mouth daily 5/2/19  Yes Neelam Bejarano MD   spironolactone (ALDACTONE) 25 MG tablet Take 1 tablet by mouth daily 5/2/19  Yes Neelam Bejarano MD   famotidine (PEPCID) 20 MG tablet TAKE 1 TABLET BY MOUTH EVERY DAY 4/29/19  Yes Trish Pardo MD   aspirin 81 MG tablet Take 81 mg by mouth daily   Yes Historical Provider, MD   carvedilol (COREG) 12.5 MG tablet Take 1 tablet by mouth 2 times daily 3/26/19  Yes Neelam Bejarano MD   NIFEdipine (PROCARDIA XL) 60 MG extended release tablet TAKE 1 TABLET BY MOUTH TWICE DAILY 3/2/19  Yes Trish Pardo MD   HUMULIN 70/30 (70-30) 100 UNIT/ML injection vial INJECT 50 UNITS EVERY MORNING AND INJECT 40 UNITS EVERY EVENING 3/2/19  Yes Trish Pardo MD   hydrALAZINE (APRESOLINE) 100 MG tablet TAKE 1 TABLET BY MOUTH THREE TIMES DAILY 1/29/19  Yes Historical Provider, MD   isosorbide mononitrate (IMDUR) 60 MG extended release tablet TAKE 1 TABLET BY MOUTH TWICE DAILY 2/1/19  Yes Historical Provider, MD   gemfibrozil (LOPID) 600 MG tablet Take 600 mg by mouth daily   Yes Historical Provider, MD   simvastatin (ZOCOR) 40 MG tablet TAKE 1 TABLET BY MOUTH NIGHTLY 11/19/18  Yes Trish Pardo MD   fluticasone (FLONASE) 50 MCG/ACT nasal spray 1 spray by Nasal route daily Both nostrils daily 6/29/18  Yes Lakia Chen MD   ferrous sulfate 325 (65 FE) MG tablet Take 325 mg by mouth 2 times daily    Yes Historical Provider, MD   Ascorbic Acid (VITAMIN C) 500 MG tablet Take 500 mg by mouth daily   Yes Historical Provider, MD   Multiple Vitamins-Minerals (MULTIVITAMIN ADULT PO) Take 1 tablet by mouth daily    Yes Historical Provider, MD   Blood Glucose Monitoring Suppl (ONE TOUCH ULTRA MINI) w/Device KIT Use to test sugar twice daily 4/9/19   Lakia Chen MD   ONE TOUCH ULTRASOFT LANCETS MISC Test blood sugar twice daily 3/26/19   Lakia Chen MD   blood glucose test strips (ASCENSIA AUTODISC VI;ONE TOUCH ULTRA TEST VI) strip One touch ultra strips. Test blood sugar BID,DIAGNOIS:SM TYPE II 3/26/19   Lakia Chen MD   Insulin Syringe-Needle U-100 (B-D INS SYR ULTRAFINE 1CC/30G) 30G X 1/2\" 1 ML MISC Use 2 daily   DX: E11.9 12/5/18   Lakia Chen MD   clopidogrel (PLAVIX) 75 MG tablet TAKE 1 TABLET BY MOUTH DAILY. Patient taking differently: TAKE 1 TABLET BY MOUTH DAILY. As of 04/02/2019 will be on HOLD 9/10/18   Lakia Chen MD   Blood Pressure Monitor MISC Please dispense blood pressure monitor per patient's insurance 5/22/18   Padma Yu, DO       Allergies:  Lisinopril    Social History:   TOBACCO:   reports that he quit smoking about 50 years ago. His smoking use included cigarettes. He has a 0.80 pack-year smoking history. He has never used smokeless tobacco.  ETOH:   reports that he drank alcohol.   OCCUPATION:      Family History:       Problem Relation Age of Onset    Heart Attack Mother     Heart Attack Father     Diabetes type 2  Sister        REVIEW OF SYSTEMS:  Constitutional:  negative for  fevers, chills and malaise  HEENT:  negative for  ear drainage, earaches, nasal congestion and sore throat  Neck:  No lumps or masses  Cardiac:  negative for  chest pain, dyspnea, palpitations  Respiratory:  negative for  dry cough, cough with sputum and dyspnea  Gastrointestinal: 05/08/19 2045   INR 1.2     -----------------------------------------------------------------  PA/lat CXR: Pulmonary edema. Assessment and Plan   1. Hypoglycemia - stable since admission. 2. Acute on chronic systolic heart failure - not sure of the degree since patient denies SOB, Orthopnea, or PND but the CXR showed significant pulmonary edema. Oxygen saturations are in the high 90s on room air. 3. Cardiomyopathy - life vest on. 4. CAD - no chest pain. Follows with Dr. Nawaf Hicks. 5. CKD - creatinine slightly higher than baseline. 6. HTN - controlled. 7. Chronic anemia. Plan:  1. Observation admission on cardiac monitor. Discussed with Dr. Nawaf Hicks and he would like to place him in the unit on Dobutamine drip. Will make a regular admission. 2.  Monitor BS closely. 3.  Consult Dr. Nawaf Hicks in Cardiology. 4.  Continue home medication but hold 70/30 insulin with Humalog sliding scale. 5.  Heparin for DVT prophylaxis. 6.  Full code. Requires admission secondary to the treatment of CHF and further evaluation of blood sugars. Estimated length of stay 3 days. The beneficiary may reasonably be expected to be discharged or transferred to a hospital within 96 hours after admission.       Patient Active Problem List   Diagnosis Code    CAD (coronary artery disease) I25.10    Type 2 diabetes mellitus with stage 2 chronic kidney disease, with long-term current use of insulin ([facility]) E11.22, N18.2, Z79.4    Hyperlipemia E78.5    Shortness of breath R06.02    Normocytic hypochromic anemia D50.9    Chronic kidney disease, stage III (moderate) ([facility]) N18.3    Vitamin D deficiency disease E55.9    Essential hypertension I10    Acute on chronic systolic CHF (congestive heart failure) ([facility]) I50.23    GIB (gastrointestinal bleeding) K92.2    Blood in stool K92.1    Ischemic cardiomyopathy I25.5    Hypoglycemia E16.2       Goran Li MD  Admitting Hospitalist

## 2019-05-09 NOTE — PROGRESS NOTES
Quality flow rounds held on 5/9/19     Claire Patel is admitted for  CHF . Length of stay 0. Education:    Needed Education: CHF, meds, follow up      Do you have any questions regarding your plan of care while at the hospital? denies    Planned Disposition:               [x]  Home when able                [] Swing Bed                [] ECF/SNF               [] Other/TBD    Barriers to Discharge:    Can you afford your medications? yes   Do you have transportation to follow up appointments? Drives self or wife drives   Do you need any new equipment at home? none   Current equipment includes   has cane but uses as needed, currently wearing life vest    Do you have a living will or durable power of  for healthcare? no               If yes do we have a copy on file? No, pt is interested in completing while her, LSW will provide pt with information. Do you or your family have any questions or concerns we haven't already discussed? Shayne Morelos and writer present for rounding.

## 2019-05-10 NOTE — PROGRESS NOTES
Patient yells \"hey\" heard from the nurse's station. Requests tissues, this nurse provides. This nurse reminds patient to use call light and provides education how and when to use it.

## 2019-05-10 NOTE — PROGRESS NOTES
Cardiology    He feels better with less SOB, although still becomes dyspneic with any activity. Denies cp. VSS  Lungs relatively clear  CV unchanged  No ascites  2-3 plus edema    Creatinine up to 2.61 with BUN 60    I think he is probably euvolemic. Will stop IV lasix and place him on Demadex 20 mg daily. Continue Dobutamine and Aldactone. CXR pending. Will attempt to ambulate today. Stop Dobutamine tomorrow, and if clinically appropriate, could possibly go home Sunday. From my viewpoint, does not need to continue wearing the LifeVest.  Would remove it now during the hospitalization so he is comfortable being without it at home.     Thanks, Eloise Crocker MD

## 2019-05-10 NOTE — PROGRESS NOTES
Hospitalist Progress Note  5/10/2019 5:07 AM  Subjective:   Admit Date: 5/8/2019  PCP: Lety Kirby MD    Interval History: Porsche Patrick states he is feeling better. He has noticed an improvement on the IV Dobutamine. Weight is down 4 lbs. No chest pain. He did have trouble sleeping last night. Appetite good yesterday, no nausea. He denies any trouble urinating. He feels his leg swelling has improved. No further episodes of hypoglycemia. Diet: DIET CARB CONTROL;  Medications:   Scheduled Meds:   vitamin C  500 mg Oral Daily    aspirin EC  81 mg Oral Daily    carvedilol  12.5 mg Oral BID    DULoxetine  20 mg Oral Daily    famotidine  20 mg Oral Daily    ferrous sulfate  325 mg Oral BID    fluticasone  1 spray Nasal Daily    hydrALAZINE  100 mg Oral 3 times per day    isosorbide mononitrate  60 mg Oral BID    NIFEdipine  60 mg Oral BID    simvastatin  40 mg Oral QPM    sodium bicarbonate  650 mg Oral BID    spironolactone  25 mg Oral Daily    sodium chloride flush  10 mL Intravenous 2 times per day    heparin (porcine)  5,000 Units Subcutaneous BID    insulin lispro  0-6 Units Subcutaneous TID WC    insulin lispro  0-3 Units Subcutaneous Nightly    furosemide  20 mg Intravenous BID    ipratropium-albuterol  1 ampule Inhalation Once     Continuous Infusions:   dextrose      DOBUTamine 2.5 mcg/kg/min (05/09/19 0910)     CBC:   Recent Labs     05/08/19 2045   WBC 8.6   HGB 9.6*        BMP:    Recent Labs     05/08/19 2045 05/09/19  0332    142   K 4.7 4.1    103   CO2 22 24   BUN 62* 60*   CREATININE 2.32* 2.36*   GLUCOSE 82 103*     Hepatic: No results for input(s): AST, ALT, ALB, BILITOT, ALKPHOS in the last 72 hours. Troponin: No results for input(s): TROPONINI in the last 72 hours. BNP: No results for input(s): BNP in the last 72 hours. Lipids: No results for input(s): CHOL, HDL in the last 72 hours.     Invalid input(s): LDLCALCU  INR:   Recent Labs 05/08/19 2045   INR 1.2         Objective:   Vitals: BP (!) 146/70   Pulse 88   Temp 98.9 °F (37.2 °C) (Oral)   Resp 18   Ht 5' 8\" (1.727 m)   Wt 196 lb 8 oz (89.1 kg)   SpO2 91%   BMI 29.88 kg/m²   General appearance: alert and cooperative with exam  HEENT: Head: Normocephalic, no lesions, without obvious abnormality. Eye: Normal external eye, conjunctiva, lids cornea, REAL. Nose: Normal external nose, mucus membranes and septum. Neck: no adenopathy and supple, symmetrical, trachea midline  Lungs: Few basilar rales in the bases posteriorly. Heart: regular rate and rhythm and S1, S2 normal, II/VI systolic murmur. Abdomen: soft, non-tender; bowel sounds normal; no masses,  no organomegaly and over wt. Extremities: 2 + bilateral LE edema. No calf tenderness. Neurologic: Mental status: Alert, oriented, thought content appropriate    Assessment and Plan:   1. Hypoglycemia - stable since admission. 2. Acute on chronic systolic heart failure - Doing well with Dobutamine drip and IV Lasix. 3. Cardiomyopathy - life vest on.  EF 35% on ECHO. 4. CAD - no chest pain. Follows with Dr. Irving Sales. 5. CKD - creatinine slightly higher than baseline. 6. HTN - controlled. 7. Chronic anemia. Plan:  1. Continue the current treatment. Dr. Irving Sales to manage CHF medication. 2.  Follow up on am labs when available. Patient continues to require in patient admission secondary to the need for IV Lasix, IV Dobutamine, and close monitoring of condition.       Patient Active Problem List:     CAD (coronary artery disease)     Type 2 diabetes mellitus with stage 2 chronic kidney disease, with long-term current use of insulin (HCC)     Hyperlipemia     Shortness of breath     Normocytic hypochromic anemia     Chronic kidney disease, stage III (moderate) (HCC)     Vitamin D deficiency disease     Essential hypertension     Acute on chronic systolic CHF (congestive heart failure) (HCC)     GIB (gastrointestinal bleeding)     Blood in stool     Ischemic cardiomyopathy     Hypoglycemia      Channing Dick MD  Rounding Hospitalist

## 2019-05-10 NOTE — PROGRESS NOTES
Dangles at edge of bed, states he breathes more comfortably this way. Pulse ox WNL, Lungs clear and diminished.

## 2019-05-10 NOTE — PROGRESS NOTES
Pt states he feels \"better\" but remains SOB with exertion and feels anxious but refuses offer for PRN Ativan at this time. Pt frequently going between bed, sitting on side of bed, and sitting in recliner. Pt states his appetite is improved for breakfast and requests to go back to bed. Pt resting quietly with eyes closed in bed. Call light and bedside table within reach.

## 2019-05-10 NOTE — PROGRESS NOTES
Pt c/o nausea and state he feels he may throw up; emesis basin provided. Pt medicated with PRN Zofran as ordered. Pt denies pain, dizziness, or SOB. Pt states that \"the food is no good\" pt offered other food but declines. Pt sitting on side of bed and is encouraged to lay with legs elevated r/t dependent edema; pt states he will try later. Call light and bedside table within reach.

## 2019-05-11 NOTE — PROGRESS NOTES
61   Temp 98 °F (36.7 °C) (Oral)   Resp 22   Ht 5' 8\" (1.727 m)   Wt 197 lb 1.6 oz (89.4 kg)   SpO2 96%   BMI 29.97 kg/m²   General appearance: alert and cooperative with exam  HEENT: Head: Normocephalic, no lesions, without obvious abnormality. Eye: Normal external eye, conjunctiva, lids cornea, REAL. Nose: Normal external nose, mucus membranes and septum. Nose: Nasal oxygen on. Neck: no adenopathy and supple, symmetrical, trachea midline  Lungs: Scattered rhonchi and wheezing that improves some with a cough. Heart: regular rate and rhythm, S1, S2 normal and systolic murmur. Abdomen: soft, non-tender; bowel sounds normal; no masses,  no organomegaly  Extremities: 2+ bilateral LE edema, no calf tenderness. Neurologic: Mental status: Alert, oriented, thought content appropriate    Assessment and Plan:   1. Hypoglycemia - stable since admission with holding 70/30 insulin. 2. Acute on chronic systolic heart failure - Tolerating Dobutamine drip. Change from [facility] to [facility] yesterday. 3. Cardiomyopathy -   EF 35% on ECHO. 4. Increase in cough / sputum - ? Early bronchitis vs early pneumonia. 5. CAD - no chest pain.  Follows with Dr. Brooke Aguilar. 6. CKD - creatinine slightly higher than baseline. 7. HTN - controlled. 8. Chronic anemia. Plan:  1. Start on IV Levaquin with Pharmacy managing. 2.  ACE wrap lower legs to help decrease edema. 3.  Dr. Brooke Aguilar managing Dobutamine drip (his note yesterday discussed stopping Dobutamine today). 4.  Follow up on am labs when available. 5.  Add Duonebs Q4prn. Patient continues to require in patient admission secondary to the need for Dobutamine drip, IV Levaquin, and close monitoring of condition.     Patient Active Problem List:     CAD (coronary artery disease)     Type 2 diabetes mellitus with stage 2 chronic kidney disease, with long-term current use of insulin (HCC)     Hyperlipemia     Shortness of breath     Normocytic hypochromic anemia Chronic kidney disease, stage III (moderate) (HCC)     Vitamin D deficiency disease     Essential hypertension     Acute on chronic systolic CHF (congestive heart failure) (HCC)     GIB (gastrointestinal bleeding)     Blood in stool     Ischemic cardiomyopathy     Hypoglycemia      Amanda Valdes MD  Rounding Hospitalist

## 2019-05-11 NOTE — PROGRESS NOTES
Report rec'd,  Pt A&O x4, denies pain. Ace wraps on bilaterally. VS and BS obtained.  PT sits up in bed eating breakfast.

## 2019-05-11 NOTE — PLAN OF CARE
Problem: PAIN  Goal: Patient's pain/discomfort is manageable  Outcome: Met This Shift     Problem:  Activity:  Goal: Capacity to carry out activities will improve  Description  Capacity to carry out activities will improve  Outcome: Ongoing  Goal: Ability to tolerate increased activity will improve  Description  Ability to tolerate increased activity will improve  Outcome: Ongoing

## 2019-05-11 NOTE — PROGRESS NOTES
Nurse checks on patient. Pt sitting on edge of bed. Relates that he is having trouble expectorating mucus. Audible congestion noted. Pt remains on O2 at 2l nc. Supportive care given.

## 2019-05-11 NOTE — PROGRESS NOTES
RT informs this nurses NC decreased to 1 L oxygen. Pt ambulated in reardon to elevators and back. Pt had to take several \"rest breaks\" stopping but remained standing. He did however sit one time in wc. Oxygen levels remained stable on 1L of oxygen with sats at 91% during ambulation. Electronically signed by Erica Da Silva RN on 5/11/2019 at 1:56 PM

## 2019-05-11 NOTE — PROGRESS NOTES
Pharmacy Note  Levaquin Consult    Mary Braxton is a 70 y.o. male started on Levaquin for Bronchitis vs Pneumonia; consult received from Dr. Jameel Duran Back to manage therapy. Also receiving the following antibiotics:    Patient Active Problem List   Diagnosis    CAD (coronary artery disease)    Type 2 diabetes mellitus with stage 2 chronic kidney disease, with long-term current use of insulin ([facility])    Hyperlipemia    Shortness of breath    Normocytic hypochromic anemia    Chronic kidney disease, stage III (moderate) ([facility])    Vitamin D deficiency disease    Essential hypertension    Acute on chronic systolic CHF (congestive heart failure) ([facility] [address]    GIB (gastrointestinal bleeding)    Blood in stool    Ischemic cardiomyopathy    Hypoglycemia       Allergies:  Lisinopril     Temp max: 98,9 F (5-10-19)    Recent Labs     05/10/19  0428 05/11/19  0500   BUN 60* 67*       Recent Labs     05/10/19  0428 05/11/19  0500   CREATININE 2.61* 2.60*       Recent Labs     05/08/19  2045   WBC 8.6         Intake/Output Summary (Last 24 hours) at 5/11/2019 0855  Last data filed at 5/11/2019 0830  Gross per 24 hour   Intake 1423 ml   Output 525 ml   Net 898 ml       Culture Date      Source                       Results  none    Ht Readings from Last 1 Encounters:   05/10/19 5' 8\" (1.727 m)        Wt Readings from Last 1 Encounters:   05/11/19 197 lb 1.6 oz (89.4 kg)         Body mass index is 29.97 kg/m². Estimated Creatinine Clearance: 26 mL/min (A) (based on SCr of 2.6 mg/dL (H)). Assessment/Plan:  Levaquin initiated at 750 mg  IV q 48 hrs. Will need 7 days of therapy or per MD.  Can change to oral with next dose. Thank you for the consult. Will continue to follow.   Ines Mukherjee, Pharm D.  5/11/2019  8:59 AM

## 2019-05-11 NOTE — PLAN OF CARE
Problem: Pain:  Goal: Pain level will decrease  Description  Pain level will decrease  Outcome: Met This Shift  Goal: Control of acute pain  Description  Control of acute pain  Outcome: Met This Shift     Problem: Falls - Risk of:  Goal: Will remain free from falls  Description  Will remain free from falls  Outcome: Met This Shift  Goal: Absence of physical injury  Description  Absence of physical injury  Outcome: Met This Shift     Problem: SAFETY  Goal: Free from accidental physical injury  Outcome: Met This Shift  Goal: Free from intentional harm  Outcome: Met This Shift     Problem: DAILY CARE  Goal: Daily care needs are met  Outcome: Met This Shift     Problem: PAIN  Goal: Patient's pain/discomfort is manageable  5/11/2019 1656 by Pamela Cabrera RN  Outcome: Met This Shift  5/11/2019 0953 by Pamela Cabrera RN  Outcome: Met This Shift     Problem: SKIN INTEGRITY  Goal: Skin integrity is maintained or improved  Outcome: Met This Shift     Problem: Nutritional:  Goal: Maintenance of adequate nutrition will improve  Description  Maintenance of adequate nutrition will improve  Outcome: Met This Shift     Problem: Pain:  Goal: Control of chronic pain  Description  Control of chronic pain  Outcome: Ongoing     Problem: KNOWLEDGE DEFICIT  Goal: Patient/S.O. demonstrates understanding of disease process, treatment plan, medications, and discharge instructions. Outcome: Ongoing     Problem:  Activity:  Goal: Capacity to carry out activities will improve  Description  Capacity to carry out activities will improve  5/11/2019 1656 by Pamela Cabrera RN  Outcome: Ongoing  5/11/2019 0953 by Pamela Cabrera RN  Outcome: Ongoing  Goal: Will verbalize the importance of balancing activity with adequate rest periods  Description  Will verbalize the importance of balancing activity with adequate rest periods  Outcome: Ongoing  Goal: Ability to tolerate increased activity will improve  Description  Ability to tolerate increased activity will improve  5/11/2019 1656 by Ruby Joseph RN  Outcome: Ongoing  5/11/2019 0953 by Ruyb Joseph RN  Outcome: Ongoing     Problem: Cardiac:  Goal: Ability to maintain an adequate cardiac output will improve  Description  Ability to maintain an adequate cardiac output will improve  Outcome: Ongoing     Problem: Respiratory:  Goal: Ability to maintain adequate ventilation will improve  Description  Ability to maintain adequate ventilation will improve  Outcome: Ongoing

## 2019-05-11 NOTE — PROGRESS NOTES
Placed on oxygen via nasal cannula at 2l/nc. On room air, Spo2 88-89%. After oxygen placed, 95-96% on 2l/nc. Shallow respirations. Patient states there is \"phlegm caught in there. \"

## 2019-05-12 NOTE — PLAN OF CARE
Problem: Pain:  Goal: Pain level will decrease  Description  Pain level will decrease  5/11/2019 2352 by Jackie Denise RN  Outcome: Met This Shift  5/11/2019 1656 by Nida Dowling RN  Outcome: Met This Shift  Goal: Control of acute pain  Description  Control of acute pain  5/11/2019 1656 by Nida Dowling RN  Outcome: Met This Shift     Problem: Falls - Risk of:  Goal: Will remain free from falls  Description  Will remain free from falls  5/11/2019 2352 by Jackie Denise RN  Outcome: Met This Shift  5/11/2019 1656 by Nida Dowling RN  Outcome: Met This Shift  Goal: Absence of physical injury  Description  Absence of physical injury  5/11/2019 1656 by Nida Dowling RN  Outcome: Met This Shift     Problem: SAFETY  Goal: Free from accidental physical injury  5/11/2019 1656 by Nida Dowling RN  Outcome: Met This Shift  Goal: Free from intentional harm  5/11/2019 1656 by Nida Dowling RN  Outcome: Met This Shift     Problem: DAILY CARE  Goal: Daily care needs are met  5/11/2019 1656 by Nida Dowling RN  Outcome: Met This Shift     Problem: PAIN  Goal: Patient's pain/discomfort is manageable  5/11/2019 1656 by Nida Dowling RN  Outcome: Met This Shift  5/11/2019 0953 by Nida Dowling RN  Outcome: Met This Shift     Problem: SKIN INTEGRITY  Goal: Skin integrity is maintained or improved  5/11/2019 2352 by Jackie Denise RN  Outcome: Met This Shift  5/11/2019 1656 by Nida Dowling RN  Outcome: Met This Shift     Problem: Nutritional:  Goal: Maintenance of adequate nutrition will improve  Description  Maintenance of adequate nutrition will improve  5/11/2019 1656 by Nida Dowling RN  Outcome: Met This Shift

## 2019-05-12 NOTE — PROGRESS NOTES
Pt in bed, refuses to get cleaned up or ambulate at this time. States that he is tired and does not feel like walking.   States that he is not as short of breath as he normally is

## 2019-05-12 NOTE — PROGRESS NOTES
Pt up and ambulated to nurses station and back with O2 at 1l. Pt had to stop 3 times during ambulation to catch his breath. Pt returns to room and states that he is \"just worn out\"  In bed with head of bed elevated. Call light in reach.

## 2019-05-12 NOTE — PROGRESS NOTES
Dr. Vitor Bray called concerning demadex. Administration. Dr. Vitor Bray states to hold demadex and to give aldactone.

## 2019-05-12 NOTE — PLAN OF CARE
Problem: Falls - Risk of:  Goal: Will remain free from falls  Description  Will remain free from falls  Outcome: Ongoing     Problem: SAFETY  Goal: Free from accidental physical injury  Outcome: Ongoing     Problem: KNOWLEDGE DEFICIT  Goal: Patient/S.O. demonstrates understanding of disease process, treatment plan, medications, and discharge instructions. Outcome: Ongoing     Problem:  Activity:  Goal: Capacity to carry out activities will improve  Description  Capacity to carry out activities will improve  Outcome: Ongoing     Problem: Coping:  Goal: Verbalizations of decreased anxiety will decrease  Description  Verbalizations of decreased anxiety will decrease  Outcome: Ongoing

## 2019-05-12 NOTE — PROGRESS NOTES
Hospitalist Progress Note  5/12/2019 7:54 AM  Subjective:   Admit Date: 5/8/2019  PCP: Babs Pedraza MD    Interval History: Alannah Mtz feels he is doing a little better today than yesterday after starting on IV Levaquin. He states he is coughing but not as much is coming up. Alannah Mtz has noticed that he is wheezing but not sure if this is more than yesterday. He was able to ambulate in the reardon yesterday but did notice he had to stop to take breaks. Oxygen down to 1 l/m this am.  Appetite is good, no nausea. Bowels are moving. Diet: DIET CARB CONTROL; Low Sodium (2 GM)  Medications:   Scheduled Meds:   methylPREDNISolone  40 mg Intravenous Q8H    guaiFENesin  600 mg Oral BID    levofloxacin  750 mg Intravenous Q48H    torsemide  20 mg Oral Daily    vitamin C  500 mg Oral Daily    aspirin EC  81 mg Oral Daily    carvedilol  12.5 mg Oral BID    DULoxetine  20 mg Oral Daily    famotidine  20 mg Oral Daily    ferrous sulfate  325 mg Oral BID    fluticasone  1 spray Nasal Daily    hydrALAZINE  100 mg Oral 3 times per day    isosorbide mononitrate  60 mg Oral BID    NIFEdipine  60 mg Oral BID    simvastatin  40 mg Oral QPM    sodium bicarbonate  650 mg Oral BID    spironolactone  25 mg Oral Daily    sodium chloride flush  10 mL Intravenous 2 times per day    heparin (porcine)  5,000 Units Subcutaneous BID    insulin lispro  0-6 Units Subcutaneous TID WC    insulin lispro  0-3 Units Subcutaneous Nightly    ipratropium-albuterol  1 ampule Inhalation Once     Continuous Infusions:   dextrose       CBC: No results for input(s): WBC, HGB, PLT in the last 72 hours. BMP:    Recent Labs     05/10/19  0428 05/11/19  0500 05/12/19  0505    137 134*   K 4.5 4.8 4.6   CL 98 97* 96*   CO2 22 22 23   BUN 60* 67* 75*   CREATININE 2.61* 2.60* 2.71*   GLUCOSE 143* 140* 123*     Hepatic: No results for input(s): AST, ALT, ALB, BILITOT, ALKPHOS in the last 72 hours.   Troponin: No results for input(s): TROPONINI in the last 72 hours. BNP: No results for input(s): BNP in the last 72 hours. Lipids: No results for input(s): CHOL, HDL in the last 72 hours. Invalid input(s): LDLCALCU  INR: No results for input(s): INR in the last 72 hours. Objective:   Vitals: BP (!) 147/72   Pulse 60   Temp 98.3 °F (36.8 °C) (Oral)   Resp 18   Ht 5' 8\" (1.727 m)   Wt 201 lb 1.6 oz (91.2 kg)   SpO2 96%   BMI 30.58 kg/m²   General appearance: alert and cooperative with exam  HEENT: Head: Normocephalic, no lesions, without obvious abnormality. Eye: Normal external eye, conjunctiva, lids cornea, REAL. Nose: Normal external nose, mucus membranes and septum. Nose: Nasal oxygen on. Neck: no adenopathy and supple, symmetrical, trachea midline  Lungs: Bilateral expiratory wheezing. Heart: regular rate and rhythm, S1, S2 normal and difficult to appreciate lung sounds secondary to wheezing. Abdomen: soft, non-tender; bowel sounds normal; no masses,  no organomegaly and over wt. Extremities: ACE wraps on. No lower leg edema. Neurologic: Mental status: Alert, oriented, thought content appropriate    Assessment and Plan:   1. Hypoglycemia - stable since admission with holding 70/30 insulin. HgbA1C was 5.3 upon admission. 2. Acute on chronic systolic heart failure - Dobutamine drip stopped on 5/11. Changed from Lasix to Demadex on 5/10.  3. Cardiomyopathy -   EF 35% on ECHO. Life vest discontinued by Dr. Kt Heart. 4. Increase in cough / sputum - ? Early bronchitis vs early pneumonia. Increase in wheezing on my exam today. Started on Levaquin on 5/11.    5. CAD - no chest pain.  Follows with Dr. Kt Heart. 6. CKD - creatinine slowly increasing. 7. HTN - controlled. 8. Chronic anemia. Plan:  1. Add CBC with diff to labs. 2.  CXR today. 3.  Add Solumedrol 40 mg every 8 hours. Patient is on a Humalog sliding scale as his BS will likely increase.   4.  Will have nursing find out if Dr. Kt Heart feels we should hold the diuretic today with his BUN / creatinine climbing. 5.  Repeat BMP tomorrow. Patient continues to require in patient admission secondary to the need for IV Antibiotic, IV Steroids, Oxygen, and close monitoring of condition.       Patient Active Problem List:     CAD (coronary artery disease)     Type 2 diabetes mellitus with stage 2 chronic kidney disease, with long-term current use of insulin (HCC)     Hyperlipemia     Shortness of breath     Normocytic hypochromic anemia     Chronic kidney disease, stage III (moderate) ([facility])     Vitamin D deficiency disease     Essential hypertension     Acute on chronic systolic CHF (congestive heart failure) ([facility])     GIB (gastrointestinal bleeding)     Blood in stool     Ischemic cardiomyopathy     Hypoglycemia      Sonia Dudley MD  Rounding Hospitalist

## 2019-05-12 NOTE — PROGRESS NOTES
Pharmacy Note  Vancomycin Consult    Cele Phan is a 70 y.o. male started on Vancomycin for Increased cough/Sputum/Possible Pneumonia; consult received from Dr. Elvia Kaplan Back to manage therapy. Also receiving the following antibiotics: Levaquin    Patient Active Problem List   Diagnosis    CAD (coronary artery disease)    Type 2 diabetes mellitus with stage 2 chronic kidney disease, with long-term current use of insulin ([facility])    Hyperlipemia    Shortness of breath    Normocytic hypochromic anemia    Chronic kidney disease, stage III (moderate) ([facility])    Vitamin D deficiency disease    Essential hypertension    Acute on chronic systolic CHF (congestive heart failure) ([facility]    GIB (gastrointestinal bleeding)    Blood in stool    Ischemic cardiomyopathy    Hypoglycemia       Allergies:  Lisinopril     Temp max:98.3 F    Recent Labs     05/11/19  0500 05/12/19  0505   BUN 67* 75*       Recent Labs     05/11/19  0500 05/12/19  0505   CREATININE 2.60* 2.71*       Recent Labs     05/12/19  0505   WBC 5.7         Intake/Output Summary (Last 24 hours) at 5/12/2019 1404  Last data filed at 5/12/2019 8213  Gross per 24 hour   Intake 300 ml   Output 600 ml   Net -300 ml       Culture Date      Source                       Results  none    Ht Readings from Last 1 Encounters:   05/10/19 5' 8\" (1.727 m)        Wt Readings from Last 1 Encounters:   05/12/19 201 lb 1.6 oz (91.2 kg)         Body mass index is 30.58 kg/m². Estimated Creatinine Clearance: 25 mL/min (A) (based on SCr of 2.71 mg/dL (H)). Goal Trough Level: 15 - 20  mcg/mL    Assessment/Plan:  Will initiate vancomycin 1500 mg IV x 1 since patient's serum creatinine increasing daily. We will order a level 24 hrs after one dose and assess further dosing form there. Timing of trough level will be determined based on culture results, renal function, and clinical response. Thank you for the consult. Will continue to follow.   Brandy Maya, Pharm [facility]  5/12/2019  2:07 PM

## 2019-05-13 NOTE — PROGRESS NOTES
31.28 kg/m²   BMI: Body mass index is 31.28 kg/m². CBC:   Recent Labs     05/12/19  0505 05/13/19  0553   WBC 5.7 8.5   HGB 8.6* 8.6*    296     BMP:    Recent Labs     05/11/19  0500 05/12/19  0505 05/13/19  0553    134* 135   K 4.8 4.6 5.1   CL 97* 96* 96*   CO2 22 23 22   BUN 67* 75* 77*   CREATININE 2.60* 2.71* 2.66*   GLUCOSE 140* 123* 226*       Physical Exam:  General Appearance: alert and oriented to person, place and time, in no acute distress  Cardiovascular: normal rate, regular rhythm, normal S1 and S2, no murmurs  Pulmonary/Chest: Scattered bilateral expiratory wheezes, bibasilar rhonchi   Abdomen: soft, non-tender, normal bowel sounds   Extremities: Lower extremities with Ace wraps   Skin: warm and dry, no rash or erythema  Neurological: alert, oriented, normal speech, no focal findings or movement disorder noted    Assessment and Plan:     1. Acute on chronic systolic CHF, EF 53% per 2D echo - was treated with dobutamine drip, now on Demadex and spironolactone. Still symptomatic with shortness of breath. Has worsening of BUN. Possibly has ascites, CT scan of abdomen and pelvis ordered by  to evaluate ascites amount. 2.  Questionable pneumonia versus bronchitis with hypoxia- started on Levaquin, steroids, nebulizers. Still on oxygen  3. Acute on chronic kidney disease stage 3- likely due to diuretics  4. Chronic anemia which is iron deficiency and CKD - on iron replacement. According to his nephrologist 's notes he declined colonoscopy in the past.  5.  Hypertension -blood pressure stable on current meds  6. Diabetes mellitus type 2 - patient with episodes of hypoglycemia, insulin 70/30 discontinued, managing with  sliding scale short-acting insulin  7.   Deconditioning, generalized weakness - consult PT and OT for evaluation    Consult palliative care for evaluation    Patient continues to require inpatient admission related to need for diuretics, IV

## 2019-05-13 NOTE — PROGRESS NOTES
Pt requested not to be woke up during the night for breathing txs. He said he would call if he needs one during the night.

## 2019-05-13 NOTE — PROGRESS NOTES
Cardiology    He continues to struggle. Demadex was held last night because of elevated BUN, although creatinine has been relatively stable. His weight is up 4-5 pounds today. Continues to be Sob, and his abdomen is tense, not sure how much ascites is present. Denies any chest pain. Doing very little walking at this point. /57 with HR 67   Mild JVD  Crackles bilat  Abdomen swollen and tense but no pain. Difficult to determine amount of ascites. 3 plus edema    1. Continued Sob  2. CXR better  3. Question ascites secondary to pulmonary hypertension  4. CAD  5.  EF approx 35% by echo and 47% by MUGA    Plan:  CT of abdomen and pelvis to determine amount of ascites present to help guide diuretic therapy. His fluid status is very difficult to determine. Continues to be Sob, although BUN an Cr would point to him being intravascularly depleted. His abdomen is very protuberant, and suspect ascites, although cannot determine by exam.  Will do CT of abdomen and pelvis w/o contrast.    Will restart Demadex. If ascites present, will increase Aldactone. Very inactive at this point, and suspect would have very difficult time being at home.     Thanks, Opal Prince MD

## 2019-05-13 NOTE — CONSULTS
Vancomycin Day: 2    Patient's labs, cultures, vitals, and vancomycin regimen reviewed. Patient's trough less than 4.  Increased vancomycin 1750 mg x 1 dose and redraw trough 18 hours later, 5/14/19 1000am   Electronically signed by Elisha Ca, [address] [place] [address] on 5/13/2019 at 4:16 PM

## 2019-05-13 NOTE — PROGRESS NOTES
SW consult on pt due to palliative care being unavailable until Wednesday. Pt is a 70year old male who lives at home with his wife. He has CHF, was in ICU status last week on a dobutamine drip but is in step down currently. Has generalized weakness, therapy has been consulted. He has 3 daughters who live locally, dtr Brooke Ayala lives across the street and often is checking on the pt and his wife. He uses a cane. Pt retired from [employer], states he enjoyed his work there and did a \"little bit of everything\". Has had limited mobility the last 6 to 9 months. States he currently enjoys most sitting on the porch and \"people watching\", states he \"isn't up for much else\". He doesn't drive, which he misses, his wife drives locally. States he knows it's his CHF that is causing all of the issues. Advanced Care Planning discussion. Pt receptive to discussion but offers few answers to SW. Does state he has read the information given to him last week, he does not feel the need to do any \"formal paperwork\". SW encouraged pt to speak with family about his wishes so that they can honor those. He does desire to be a FULL code at this point. COLLINS met with dtr in [facility] after leaving pt's room, she would like pt to put his wishes in writing, SW educated on pt being alert and oriented and that if he does not wish to do so at this time we cannot force him. She understands, also understands he may be more receptive to filling them out as an outpt. Packet and SW contact information given to dtr Brooke Ayala. Dtr also states pt may need to go to the [facility] for a few days prior to d/c due to small foot surgery pt's spouse is having. Family would like to talk to the pt this evening before SW speaks with pt regarding d/c plan. There is no tentative d/c date noted in the chart. SW to follow.      CHRISTIANO Hernandez  5/13/2019

## 2019-05-13 NOTE — PROGRESS NOTES
Patient c/o \"sore hip\" [facility] \" Skin intact. This nurse encourages patient to ambulate, patient refuses. Then offered to assists patient from bed to recliner, patient refuses. Accucheck completed. Patient voices concern that his blood sugar is high tonight. This nurse educates patient on the affects of steroids on blood sugar levels. Patient voices understanding.

## 2019-05-13 NOTE — PROGRESS NOTES
Nutrition Assessment    Type and Reason for Visit: Reassess    Nutrition Recommendations:  Encourage oral intakes post ct scan    Nutrition Assessment: Continued altered nutrition related lab values, r/t endocrine dysfunction, and CKD, with increasing renal indices. PO intakes are poor-fair overall, with weight increases from generalized edema and ascites. Patient reports that appetite is actually pretty good, just that food not \"tasting good\", which may be r/t low sodium aspect of current diet (to aid with fluid management). Renal indices remain elevated with known ckd. Nutrition Risk Level:  Moderate    Nutrient Needs:  · Estimated Daily Total Kcal: 1563-9973(32-92)  · Estimated Daily Protein (g): 77-91(1.1-1.3)  · Estimated Daily Total Fluid (ml/day): 2000    Objective Information:  · Nutrition-Focused Physical Findings: overweight and poorer dentition  · Wound Type: None  · Current Nutrition Therapies:  · Oral Diet Orders: Carb Control 4 Carbs/Meal, 2gm Sodium   · Oral Diet intake: 26-50%  · Oral Nutrition Supplement (ONS) Orders: None  · Anthropometric Measures:  · Ht: 5' 8\" (172.7 cm)   · Current Body Wt: 205 lb 11.2 oz (93.3 kg)  · Admission Body Wt: 207 lb (93.9 kg)  · Usual Body Wt: (low of 184# in March and 199.2# in April)  · % Weight Change:  ,  up from prior eval 5.2% with fluid retention  · Ideal Body Wt: 154 lb (69.9 kg), % Ideal Body 128%  · BMI Classification: BMI 30.0 - 34.9 Obese Class I    Lab Results   Component Value Date     05/13/2019    K 5.1 05/13/2019    CL 96 (L) 05/13/2019    CO2 22 05/13/2019    BUN 77 (H) 05/13/2019    CREATININE 2.66 (H) 05/13/2019    GLUCOSE 226 (H) 05/13/2019    CALCIUM 8.9 05/13/2019    PROT 7.0 05/02/2019    LABALBU 4.5 05/02/2019    BILITOT 0.30 05/02/2019    ALKPHOS 60 05/02/2019    AST 13 05/02/2019    ALT 8 05/02/2019    LABGLOM 23 (L) 05/13/2019    GFRAA 28 (L) 05/13/2019    GLOB NOT REPORTED 07/18/2013     Recent Labs     05/11/19  0745

## 2019-05-13 NOTE — PROGRESS NOTES
Patient reports sleep as poor. States he was just unable to fall asleep. Patient has sat up on the edge of the bed watching television for the majority of this nurse's shift. Snacks, beverages, and warm blankets provided throughout the night to assist with comfort. Denies needs.

## 2019-05-13 NOTE — PROGRESS NOTES
[facility]  Occupational Therapy  Evaluation  Date: 2019  Patient Name: Vinay Burger        MRN: [id number]    : 1940  (70 y.o.)  Gender: male   Referring Practitioner: Dr. Quynh Castro  Diagnosis: CHF  Additional Pertinent Hx: 70 y.o. male who presents to ED by ems following episode of hypoglycemia. He is an insulin-dependent diabetic 2 and fell asleep not eating supper last night at home his wife saw that he was unresponsive and called 911. He has CHF, was in ICU status last week on a dobutamine drip but is in step down currently.    Past Medical History:   Diagnosis Date    CAD (coronary artery disease)     MI on July 15, 2012    [facility]     Chicken pox     Chronic kidney disease     CKD stage 3 due to type 2 [facility] mellitus [facility]     Heart failure [facility]     Hypertension     Measles     Mumps     Osteoarthritis     Tracheal obstruction 2012    trach after intubation for MI    Type II or unspecified type diabetes mellitus without mention of complication, not stated as uncontrolled      Past Surgical History:   Procedure Laterality Date    CARDIAC CATHETERIZATION Left 10/09/2018    Dr. Marci Hahn      [facility]    CORONARY ARTERY BYPASS GRAFT      [facility]    EYE SURGERY Bilateral     Cataract removal    TRACHEOTOMY                  Subjective  Subjective: Pt in bed upon arrival  Pain Level: 0  Pain Location: Back  Pain Orientation: Left        Social/Functional History  Lives With: Spouse  Type of Home: House  Home Layout: One level  Home Access: Stairs to enter without rails  Entrance Stairs - Number of Steps: 1  Bathroom Shower/Tub: Tub/Shower unit  Bathroom Equipment: Shower chair, Grab bars in shower  Home Equipment: Cane(doesnt use often at home but does out in public)  ADL Assistance: Needs assistance(Per RN & SW pt does not complete ADLs independently, wife assists but pt states he is requests to return to supine after STS transfer completed. Completes supine to sit w/ SBA & remains there w/ call light in reach and all needs met. Per RN & SW pt does not complete ADLs independently or IADLs, pt's wife assists pt at home frequently despite pt telling OTR that he was independent w/ self care & assists w/ IADLs.       Goals  Short term goals  Time Frame for Short term goals: 5 days (5/17/19)  Short term goal 1: Pt to tolerate SAHRA UB activity x 30 min w/ 3 or fewer rest breaks while maintaining O2 sats @ 90 or above  Short term goal 2: Pt to complete toileting tasks SBA  Short term goal 3: Pt to complete item retrival activity while demonstrating good safey awareness w/ FWW/Cane at [facility] 346 term goal 4: Pt to be educated on EC/WS & PLB strategies/techniques to minimize SOB & fatigue w/ daily activities  Short term goal 5: Pt to tolerate static/dynamic standing UB functional activity x 3-5 min w/o LOB or SOB & 2 or fewer rest breaks while maintaining O2 sats @ 90 or above    Plan  REQUIRES OT FOLLOW UP: Yes    Times per week: 5x  Times per day: Daily(1-2x daily)         Time In: 1615  Time Out: 1627  Timed Coded Minutes: 0  Total Treatment Time:12    JENNIFER Parish, OTR/L  5/13/2019

## 2019-05-14 NOTE — PROGRESS NOTES
Pt provided with snack per request, pt in bed, watching television. Patient denies needs at this time, call light within reach, will continue to monitor.

## 2019-05-14 NOTE — PROGRESS NOTES
[place]  [facility]  Evaluation  Date: 2019  Patient Name: Cele Phan        MRN: [id number]    : 1940  (70 y.o.)  Gender: male   Referring Practitioner: Dr. Ellie Ngo  Diagnosis: CHF; hypoglycemia  Additional Pertinent Hx: Patient admitted with hypoglycemia however has subsequently developed  LE edema and cardiac issues requiring additional treatment. He was referred to PT to assess mobility and strength due to prolonged inactivity associated with hospitalization   Past Medical History:   Diagnosis Date    CAD (coronary artery disease)     MI on July 15, 2012    [facility]     Chicken pox     Chronic kidney disease     CKD stage 3 due to type 2 diabetes mellitus ([facility]     Heart failure ([facility] [facility]     Hypertension     Measles     Mumps     Osteoarthritis     Tracheal obstruction 2012    trach after intubation for MI    Type II or unspecified type diabetes mellitus without mention of complication, not stated as uncontrolled      Past Surgical History:   Procedure Laterality Date    CARDIAC CATHETERIZATION Left 10/09/2018    Dr. Nannette Machado      [facility]    CORONARY ARTERY BYPASS GRAFT  2010    [facility]    EYE SURGERY Bilateral     Cataract removal    TRACHEOTOMY         Restrictions         Subjective         Pain Level: 0    Orientation  Overall Orientation Status: Within Functional Limits    Home Living  Type of Home: House  Home Layout: One level  Entrance Stairs - Number of Steps: 1  Home Access: Stairs to enter without rails  Home Equipment: Cane(doesnt use often at home but does out in public)    Prior Level of Function  Additional Comments: Pt does not do much ADLs or IADLs for himself-wife assists with alot .   He does report moderate left hip pain from sitting  Prior Function  Lives With: Spouse  ADL Assistance: Needs assistance(Per RN & SW pt does not complete ADLs independently, wife assists but pt states he is independent)  Homemaking Assistance: Needs assistance(pt states he assists wife but RN & SW notes indicate that spouse does most all IADLS)  Ambulation Assistance: Independent  Transfer Assistance: Independent  Additional Comments: Pt does not do much ADLs or IADLs for himself-wife assists with alot . He does report moderate left hip pain from sitting      Objective                       Strength RLE  Strength RLE: WFL  Comment: Generally 4/5  Strength LLE  Strength LLE: WFL  Comment: Generally 4/5             Supine to Sit: Independent  Sit to Supine: Contact guard assistance(for LE managment)  Scooting: Stand by assistance  Sit to Stand: Independent  Stand to sit: Independent           Ambulation 1  Surface: level tile  Device: Single point cane  Other Apparatus: O2(2L)  Assistance: Contact guard assistance  Quality of Gait: steady but does fatigue signficantly with increased SOB . Patient uses cane for support  Distance: 60 ft x 2  Comments: Moderate SOB with ambulation and LE fatigue for which he required multiple rest breaks both for breathing and LE's.  patient does not normally use 02 at home  Patient also with moderate LE edema with nursing placing catheter to assist with fluid removal and subsequently breathing  Balance  Sitting - Static: Good  Sitting - Dynamic: Good  Standing - Static: Good  Standing - Dynamic: Fair, +    Assessment  Activity Tolerance: Patient limited by endurance   Body structures, Functions, Activity limitations: Decreased strength, Decreased endurance, Decreased ADL status  Chart Reviewed: Yes  Assessment: Patient referred to PT to assess strength and mobility  following prolonged hospitalization due to hypoglycemia and CHF. Patient currently on 02@ 2L which he did not previously require at home. He is ambulatory with cane however fatigues readily with increased SOB and LE weakness increasing his falls risk.   Patient would benefit from short term PT to address endurance with mobility and LE strengthening to  improve overall safely with mobility and reduce falls risk associated with fatigue  Prognosis: Good  Discharge Recommendations: Home with assist PRN     Type of devices: Gait belt, Call light within reach, Nurse notified     Plan  Times per week: Daily  Times per day: (1-2x per day;  1x Sat/sunday)    Goals  Short term goals  Time Frame for Short term goals: 5 days - expires 5/18/19  Short term goal 1: LE strength 4+-5/5 to complete reciprical stair ambulation to enter/exit home  Short term goal 2: Good endurance and LE strength to complete fucntional ambulation of 100 ft x 2 without fatigue and decreased balance increasing falls risk  Short term goal 3: Monitor 02 sats with progressive reduction of 02 and maintain > 90% to return home            Time In: 1140  Time Out: 1200  Timed Coded Minutes: 0  Total Treatment Time: 20     VINOD BRIGGS, PT 5/14/2019

## 2019-05-14 NOTE — PROGRESS NOTES
PT has been awake all shift long. Multiple times throughout the night this nurse has offered pt snacks, to reposition, and warm blankets, pt has denied all throughout the shift. Patient denies needs at this time, call light within reach, will continue to monitor.

## 2019-05-14 NOTE — PLAN OF CARE
[facility]  Inpatient Physical Therapy  Plan of Care  Date: 2019  Patient Name: Leni Christiansen        : 1940  (65 y.o.)  Referring Practitioner: Dr. Kristine Montero  Admission Date: 2019  Referral Date : 19  Diagnosis: CHF; hypoglycemia  Treatment Diagnosis: Weakness     PT Orders Received and Evaluation Complete  Identified Problem Areas: Body structures, Functions, Activity limitations: Decreased strength, Decreased endurance, Decreased ADL status  Prognosis: Good    Justification for Skilled Services:  [] Reduce Falls   [x] Improve Ambulation  []  Complete Daily Tasks Safely   [x] Improve Balance   [x] Improve LE strength  [x]  Return to Prior Level of Function  [x] Improve Functional Mobility   []  Family/Caregiver Education  [x] Patient Education: []Assistive Devices []Home Exercise Program and Progression    Assessment: Patient referred to PT to assess strength and mobility  following prolonged hospitalization due to hypoglycemia and CHF. Patient currently on 02@ 2L which he did not previously require at home. He is ambulatory with cane however fatigues readily with increased SOB and LE weakness increasing his falls risk.   Patient would benefit from short term PT to address endurance with mobility and LE strengthening to  improve overall safely with mobility and reduce falls risk associated with fatigue      Plan  Times per week: Daily  Times per day: (1-2x per day;  1x Sat/)  [] Modalities:  [x] Therapeutic Exercise   [x] Gait Training  [x] Therapeutic Activity    [] Home Safety Evaluation         [] Massage                        [] Neuromuscular Re-education [] Back Education             [x] Patient Education [] Home Exercise Program  Discharge Recommendations: Home with assist PRN    Rehab Potential:  [x]  Good [] Fair   []  Poor    Goals  Short term goals  Time Frame for Short term goals: 5 days - expires 19  Short term goal 1: LE strength 4+-5/5 to complete reciprical stair ambulation to enter/exit home  Short term goal 2: Good endurance and LE strength to complete fucntional ambulation of 100 ft x 2 without fatigue and decreased balance increasing falls risk  Short term goal 3: Monitor 02 sats with progressive reduction of 02 and maintain > 90% to return home         Upon Swingbed Transfer this Plan of Care will be followed until revision is complete.     VINOD BRIGGS, PT   Date: 5/14/2019

## 2019-05-14 NOTE — CONSULTS
NEPHROLOGY CONSULTATION NOTE    [facility]      Patient Name: Fidelina Lawton Date: 2019  8:06 PM  MR #: [id number]  Acct #: [de-identified]  : 1940    Requesting Physician: Sole Mera MD  Reason for consult: AMANDA    History of Presenting Illness:      Jannette Cruz is a 70 y.o. male on hospital day 5 with HTN, systolic dysfunction with EF 35% presents to the hospital with SOB, being treated for pneumonia with IV antibiotics and IV steroids and also for decompensated HF with diuretics. He has CKD IV with baseline Cr 2mg/dl and he currently has AMANDA. He reports SOB is improving since admission; no chest pain, nausea/vomiting/diarrhea/abdominal pain. Reports early satiety and increase in abdominal girth. No NSAID use.      History:      Past Medical History:   Diagnosis Date    CAD (coronary artery disease)     MI on July 15, 2012    CHF (congestive heart failure) (HCC)     Chicken pox     Chronic kidney disease     CKD stage 3 due to type 2 diabetes mellitus (HCC)     Heart failure (HCC)     Hypertension     Measles     Mumps     Osteoarthritis     Tracheal obstruction 2012    trach after intubation for MI    Type II or unspecified type diabetes mellitus without mention of complication, not stated as uncontrolled      Past Surgical History:   Procedure Laterality Date    CARDIAC CATHETERIZATION Left 10/09/2018    Dr. Lexii Cortes      [facility]    CORONARY ARTERY BYPASS GRAFT  2010    [facility]    EYE SURGERY Bilateral     Cataract removal    TRACHEOTOMY         Family History  Family History   Problem Relation Age of Onset    Heart Attack Mother     Heart Attack Father     Diabetes type 2  Sister      [] Unable to obtain due to ventilated and/ or neurologic status    Social History     Socioeconomic History    Marital status:      Spouse name: Not on file    Number of children: Not on file    Years of education: Not on file    Highest education level: Not on file   Occupational History    Not on file   Social Needs    Financial resource strain: Not on file    Food insecurity:     Worry: Not on file     Inability: Not on file    Transportation needs:     Medical: Not on file     Non-medical: Not on file   Tobacco Use    Smoking status: Former Smoker     Packs/day: 0.10     Years: 8.00     Pack years: 0.80     Types: Cigarettes     Last attempt to quit: 1969     Years since quittin.3    Smokeless tobacco: Never Used    Tobacco comment: Quit 42 years ago   Substance and Sexual Activity    Alcohol use: Not Currently     Alcohol/week: 0.0 oz     Binge frequency: Less than monthly    Drug use: No    Sexual activity: Yes     Partners: Female   Lifestyle    Physical activity:     Days per week: Not on file     Minutes per session: Not on file    Stress: Not on file   Relationships    Social connections:     Talks on phone: Not on file     Gets together: Not on file     Attends Holiness service: Not on file     Active member of club or organization: Not on file     Attends meetings of clubs or organizations: Not on file     Relationship status: Not on file    Intimate partner violence:     Fear of current or ex partner: Not on file     Emotionally abused: Not on file     Physically abused: Not on file     Forced sexual activity: Not on file   Other Topics Concern    Not on file   Social History Narrative    Not on file      [] Unable to obtain due to ventilated and/ or neurologic status    Living Arrangements: Spouse/Significant Other    Support Systems: Spouse/Significant Other, Friends/Neighbors, Children         Home Medications:      Medications Prior to Admission: sodium bicarbonate 650 MG tablet, Take 1 tablet by mouth 2 times daily  bumetanide (BUMEX) 1 MG tablet, Take 1 tablet by mouth 2 times daily  DULoxetine (CYMBALTA) 20 MG extended release capsule, Take 1 capsule by mouth daily  spironolactone ipratropium-albuterol  1 ampule Inhalation Q4H WA    vancomycin (VANCOCIN) intermittent dosing (placeholder)   Other RX Placeholder    levofloxacin  750 mg Intravenous Q48H    vitamin C  500 mg Oral Daily    aspirin EC  81 mg Oral Daily    carvedilol  12.5 mg Oral BID    DULoxetine  20 mg Oral Daily    famotidine  20 mg Oral Daily    ferrous sulfate  325 mg Oral BID    fluticasone  1 spray Nasal Daily    hydrALAZINE  100 mg Oral 3 times per day    isosorbide mononitrate  60 mg Oral BID    NIFEdipine  60 mg Oral BID    simvastatin  40 mg Oral QPM    sodium bicarbonate  650 mg Oral BID    spironolactone  25 mg Oral Daily    sodium chloride flush  10 mL Intravenous 2 times per day    heparin (porcine)  5,000 Units Subcutaneous BID    insulin lispro  0-6 Units Subcutaneous TID WC    insulin lispro  0-3 Units Subcutaneous Nightly     Continuous Infusions:   dextrose 100 mL/hr (05/12/19 1447)     PRN Meds:.acetaminophen, traMADol, acetylcysteine, zolpidem, sodium chloride flush, magnesium hydroxide, ondansetron, glucose, dextrose, glucagon (rDNA), dextrose, LORazepam  .   dextrose 100 mL/hr (05/12/19 1447)        Allergies:     I have reviewed the patient's allergies. Lisinopril  ? Review of Systems:       [x] CV, Resp, Neuro, , and all other systems reviewed and negative other than listed in HPI. [] Unable to obtain due to ventilated and/ or neurologic status    Objective Findings:     Vitals:/65   Pulse 65   Temp 97.8 °F (36.6 °C) (Oral)   Resp 18   Ht 5' 8\" (1.727 m)   Wt 211 lb (95.7 kg)   SpO2 95%   BMI 32.08 kg/m²    Intake/Output last 3 shifts:    Intake/Output Summary (Last 24 hours) at 5/14/2019 1143  Last data filed at 5/14/2019 0800  Gross per 24 hour   Intake 1040 ml   Output 575 ml   Net 465 ml   I/O last 3 completed shifts: In: 5584 [P.O.:780; I.V.:560]  Out: 700 [Urine:700]    Physical Examination:  General: Age appropriate, NAD.   HEENT: Normocephalic, no scleral icterus. Neck: No JVD. Heart: Regular, no murmur, no rub/gallop. No RV heave. Lungs: Clear to ascultation, no rales/wheezing/rhonchi. Good chest wall excursion. Abdomen: Soft, nontender, no supra-public fullness or tenderness. Extremities: No clubbing/cyanosis, 2+ LE edema. Skin: Warm, dry, normal turgor, no rash, no bruise, no petichiae. Neuro: No myoclonus or tremor. Psych: Normal affect.   : [] Chery present [x] Chery not present    Results/ Medications reviewed 5/14/2019, 11:43 AM     Laboratory, Microbiology, Pathology, Radiology, Cardiology, Medications and Transcriptions reviewed  Scheduled Meds:   [START ON 5/15/2019] methylPREDNISolone  40 mg Intravenous Daily    bumetanide  2 mg Intravenous BID    guaiFENesin  600 mg Oral BID    ipratropium-albuterol  1 ampule Inhalation Q4H WA    vancomycin (VANCOCIN) intermittent dosing (placeholder)   Other RX Placeholder    levofloxacin  750 mg Intravenous Q48H    vitamin C  500 mg Oral Daily    aspirin EC  81 mg Oral Daily    carvedilol  12.5 mg Oral BID    DULoxetine  20 mg Oral Daily    famotidine  20 mg Oral Daily    ferrous sulfate  325 mg Oral BID    fluticasone  1 spray Nasal Daily    hydrALAZINE  100 mg Oral 3 times per day    isosorbide mononitrate  60 mg Oral BID    NIFEdipine  60 mg Oral BID    simvastatin  40 mg Oral QPM    sodium bicarbonate  650 mg Oral BID    spironolactone  25 mg Oral Daily    sodium chloride flush  10 mL Intravenous 2 times per day    heparin (porcine)  5,000 Units Subcutaneous BID    insulin lispro  0-6 Units Subcutaneous TID     insulin lispro  0-3 Units Subcutaneous Nightly     Continuous Infusions:   dextrose 100 mL/hr (05/12/19 1447)       CBC:   Recent Labs     05/12/19  0505 05/13/19  0553   WBC 5.7 8.5   RBC 3.08* 3.14*   HGB 8.6* 8.6*   HCT 26.3* 26.8*   MCV 85.1 85.3   RDW 16.3* 16.2*    296     BMP:   Recent Labs     05/12/19  0505 05/13/19  0553 05/14/19  0510 05/14/19  0524   NA 134* 135 131*  --    K 4.6 5.1 5.1  --    CL 96* 96* 93*  --    CO2 23 22 20  --    PHOS  --   --   --  4.7*   BUN 75* 77* 90*  --    CREATININE 2.71* 2.66* 2.51*  --    CALCIUM 8.6 8.9 9.0  --      BNP: No results for input(s): BNP in the last 72 hours. PT/INR: No results for input(s): PROTIME, INR in the last 72 hours. APTT: No results for input(s): APTT in the last 72 hours. CARDIAC ENZYMES: No results for input(s): CKTOTAL, CKMB, CKMBINDEX, TROPONINT in the last 72 hours. FASTING LIPID PANEL:  Lab Results   Component Value Date    CHOL 154 02/04/2019    HDL 41 02/04/2019    TRIG 326 (H) 02/04/2019     LIVER PROFILE: No results for input(s): AST, ALT, ALB, BILIDIR, BILITOT, ALKPHOS in the last 72 hours. UA: No results for input(s): Ольга Santiago, PHUR, LABCAST, WBCUA, RBCUA, MUCUS, TRICHOMONAS, YEAST, BACTERIA, CLARITYU, SPECGRAV, LEUKOCYTESUR, UROBILINOGEN, BILIRUBINUR, BLOODU, LABGLUC, KETUA, PROTEINU, CHARUR, EPIU, CRYST, RENEPI in the last 72 hours. Impression:     1. Acute kidney injury  2. CKD IV with baseline Cr 2mg/dl  3. Decompensated HF with EF 35%  4. Hypertension  5. Hypermagnesemia  6. Multilobar pneumonia  7. Anemia  ? Plan: AMANDA from decompensated HF and pneumonia; Cr on admission was 2.2 and has worsened to 2.5gm/dl with diuretics; he remains hypervolemic. Will stop po torsemide and instead start on bumex 2mg bid. May continue spironolactone but monitor as his K is borderline high. His BP are well-controlled on current medications. He is on vancomycin; his last vancomycin trough was acceptable; please dose vancomycin trough with goal 15-20. No urgent need for RRT at this time; however he has tenuous volume status with baseline low renal function reserve. No more magnesium supplements. His acid base status is acceptable on oral alkali. BUN worsening in part from CRS physiology as well as from IV steroids. Will need to start KRISTAL (as outpatient). On po iron.  No IV iron given systemic infection. Recommend blood cultures   ? Comments: Thank you for allowing us to participate in the care of this patient. We will continue to follow. Please call if questions or concerns arise.         Electronically signed by Tim Monaco MD on 5/14/2019 at 11:43 AM

## 2019-05-14 NOTE — PROGRESS NOTES
Hospitalist Progress Note  5/14/2019 8:04 AM  Subjective:   Admit Date: 5/8/2019  PCP: Lety Kirby MD    Interval History:    Porsche Patrick reports feeling short of breath with activities, but better addressed. Has no chest pain, no cough, no wheezing. He also complains of feeling weak. Denies nausea/vomiting/diarrhea. His weight is up 6 pounds since yesterday. His arms are swollen today. Reports that appetite is good. The patient met with  yesterday, advanced care planning was discussed.   He wishes to be full code at this time    Diet: DIET CARB CONTROL; Low Sodium (2 GM)  Medications:   Scheduled Meds:   [START ON 5/15/2019] methylPREDNISolone  40 mg Intravenous Daily    guaiFENesin  600 mg Oral BID    ipratropium-albuterol  1 ampule Inhalation Q4H WA    vancomycin (VANCOCIN) intermittent dosing (placeholder)   Other RX Placeholder    levofloxacin  750 mg Intravenous Q48H    torsemide  20 mg Oral Daily    vitamin C  500 mg Oral Daily    aspirin EC  81 mg Oral Daily    carvedilol  12.5 mg Oral BID    DULoxetine  20 mg Oral Daily    famotidine  20 mg Oral Daily    ferrous sulfate  325 mg Oral BID    fluticasone  1 spray Nasal Daily    hydrALAZINE  100 mg Oral 3 times per day    isosorbide mononitrate  60 mg Oral BID    NIFEdipine  60 mg Oral BID    simvastatin  40 mg Oral QPM    sodium bicarbonate  650 mg Oral BID    spironolactone  25 mg Oral Daily    sodium chloride flush  10 mL Intravenous 2 times per day    heparin (porcine)  5,000 Units Subcutaneous BID    insulin lispro  0-6 Units Subcutaneous TID WC    insulin lispro  0-3 Units Subcutaneous Nightly     Continuous Infusions:   dextrose 100 mL/hr (05/12/19 1447)     PRN Medications: acetaminophen, traMADol, acetylcysteine, zolpidem, sodium chloride flush, magnesium hydroxide, ondansetron, glucose, dextrose, glucagon (rDNA), dextrose, LORazepam    Objective:   Vitals: BP (!) 159/76   Pulse 66   Temp 97.6 °F (36.4 °C) (Oral)   Resp 22   Ht 5' 8\" (1.727 m)   Wt 211 lb (95.7 kg)   SpO2 93%   BMI 32.08 kg/m²   BMI: Body mass index is 32.08 kg/m². CBC:   Recent Labs     05/12/19  0505 05/13/19  0553   WBC 5.7 8.5   HGB 8.6* 8.6*    296     BMP:    Recent Labs     05/12/19  0505 05/13/19  0553 05/14/19  0510   * 135 131*   K 4.6 5.1 5.1   CL 96* 96* 93*   CO2 23 22 20   BUN 75* 77* 90*   CREATININE 2.71* 2.66* 2.51*   GLUCOSE 123* 226* 210*       Physical Exam:  General Appearance: Up in chair eating breakfast, alert and oriented to person, place and time, in no acute distress  Cardiovascular: normal rate, regular rhythm, normal S1 and S2, no murmurs  Pulmonary/Chest:  diminished breath sounds at bases bilaterally, no wheezing, no rhonchi  Abdomen: soft, non-tender, normal bowel sounds   Extremities: Lower extremities with Ace wraps, arms swollen  Skin: warm and dry, no rash or erythema  Neurological: alert, oriented, normal speech, no focal findings or movement disorder         Assessment and Plan:     1. Acute on chronic systolic CHF, EF 08% per 2D echo - was treated with dobutamine drip,  on Demadex and spironolactone. Feels shortness of breath. CT scan abdomen and pelvis revealed minimal aside days, third spacing, bilateral moderate size pleural effusions. Difficult to diurese due to worsening renal function. Will consult nephrology for recommendations. 2.  Questionable pneumonia versus bronchitis with hypoxia-  on IV Levaquin, taper IV steroids, continue on nebulizers. Still on oxygen  3. Acute on chronic kidney disease stage 3 - likely due to diuretics, consult nephrology  4. Chronic anemia which is iron deficiency and CKD - on iron replacement. According to his nephrologist 's notes he declined colonoscopy in the past.  5.  Hypertension - blood pressure stable on current meds  6.   Diabetes mellitus type 2 - patient had episodes of hypoglycemia, insulin 70/30 discontinued, managing with  sliding scale short-acting insulin  7. Deconditioning, generalized weakness - consult PT and OT for evaluation  8.   Status post bypass surgery in 2008 at [address]. Vincent[facility] with KAPOOR to the LAD, a vein  graft to the OM, and a vein graft to right coronary artery         Patient continues to require inpatient admission related to need for further treatment of CHF, fluid overload, close monitoring of renal function, need for nephrologist evaluation      Electronically signed by Jonathan Jackson MD on 5/14/2019 at 8:04 AM    [facility] Hospitalist

## 2019-05-14 NOTE — PROGRESS NOTES
Attempt made to notify Dr. Doug Gonsalez of critical Mg level 3.4. Message left to return phone call.

## 2019-05-14 NOTE — PROGRESS NOTES
Shann[facility] Vancomycin Consult:     Vancomycin Day: day 3  Current Dosing: vancomycin 1500mg IV x1 then vancomycin 1750mg IV x1 (24 hours apart)    Temp max:  97.8    Recent Labs     05/12/19  0505 05/13/19  0553 05/14/19  0510   CREATININE 2.71* 2.66* 2.51*     Estimated Creatinine Clearance: 28 mL/min (A) (based on SCr of 2.51 mg/dL (H)). Recent Labs     05/12/19  0505 05/13/19  0553   WBC 5.7 8.5       Culture Date      Source          Results  No cultures ordered to date    Trough: 13.1    Assessment/Plan: begin Vancomycin 1250mg IV every 36 hours. First dose to begin at 2230 on 5/14/19. Continue to monitor for renal function and clinical response. Trough to be drawn 30 minutes prior to dose on 5/16/19.     Thank you,  NUPUR Fragoso.Ph., 5/14/2019,11:55 AM

## 2019-05-14 NOTE — PROGRESS NOTES
[facility]  Occupational Therapy  Daily Note  Date: 2019  Patient Name: Juan Francisco Haddad        MRN: [id number]    : 1940  (70 y.o.)    Subjective: Pt seated EOB w/ family present upon arrival    Objective  ADL     Feeding: Setup  Grooming: Setup  UE Bathing: Setup  LE Bathing: Moderate assistance  UE Dressing: Setup, Minimal assistance  LE Dressing: Setup, Moderate assistance  Toileting: Setup, Minimal assistance           Supine to Sit: Minimal assistance  Sit to Supine: Contact guard assistance                              Balance  Sitting Balance: Contact guard assistance  Standing Balance: Contact guard assistance       Sit to stand: Contact guard assistance  Stand to sit: Contact guard assistance                Assessment  Assessment: Pt seated EOB upon arrival w/ family present. Pt agreeable to OT session this am. Completes SAHRA UB exercises 1x10 w/ 1# wt w/ fair tolerance. Multiple rest breaks given as SOB is evident w/ activity. Tolerates graded pinch strengthening activity via clothespins to improve intrinsic strength & address AROM for shldr flexion bilaterally. Tolerates well. Requests to remain EOB at end of session, left there w/ call light in reach, tray table at front & family in room.    Prognosis: Fair  Discharge Recommendations: Home with assist PRN         Goals  Short Term Goals  Time Frame for Short term goals: 5 days (19)  Short term goal 1: Pt to tolerate SAHRA UB activity x 30 min w/ 3 or fewer rest breaks while maintaining O2 sats @ 90 or above  Short term goal 2: Pt to complete toileting tasks SBA  Short term goal 3: Pt to complete item retrival activity while demonstrating good safey awareness w/ FWW/Cane at [facility] 346 term goal 4: Pt to be educated on EC/WS & PLB strategies/techniques to minimize SOB & fatigue w/ daily activities  Short term goal 5: Pt to tolerate static/dynamic standing UB functional activity x 3-5 min w/o LOB or SOB & 2 or fewer rest breaks while maintaining O2 sats @ 90 or above     Time In: 1104  Time Out: 1126  Timed Coded Minutes: 22  Total Treatment Time: 22    Vanessa Ken   MOT, OTR/L Date: 5/14/2019

## 2019-05-14 NOTE — PLAN OF CARE
Problem: Pain:  Goal: Pain level will decrease  Description  Pain level will decrease  5/13/2019 1540 by Gilmar Pryor RN  Outcome: Met This Shift     Problem: Falls - Risk of:  Goal: Will remain free from falls  Description  Will remain free from falls  5/14/2019 0215 by Lexy Calles RN  Outcome: Met This Shift  5/13/2019 1540 by Gilmar Pryor RN  Outcome: Met This Shift     Problem: SAFETY  Goal: Free from accidental physical injury  5/13/2019 1540 by Gilmar Pryor RN  Outcome: Met This Shift     Problem: DAILY CARE  Goal: Daily care needs are met  5/13/2019 1540 by Gilmar Pryor RN  Outcome: Met This Shift     Problem: PAIN  Goal: Patient's pain/discomfort is manageable  5/13/2019 1540 by Gilmar Pryor RN  Outcome: Met This Shift     Problem: DISCHARGE BARRIERS  Goal: Patient's continuum of care needs are met  Outcome: Met This Shift     Problem: Coping:  Goal: Verbalizations of decreased anxiety will decrease  Description  Verbalizations of decreased anxiety will decrease  Outcome: Met This Shift     Problem: Fluid Volume:  Goal: Risk for excess fluid volume will decrease  Description  Risk for excess fluid volume will decrease  Outcome: Met This Shift

## 2019-05-14 NOTE — PROGRESS NOTES
[facility]  [facility]upational Therapy    Date: 2019  Patient Name: Edda Haines        : 1940       [x] Pt Refusal Attempted OT tx this afternoon at 1330. Pt sound asleep on side. Pt awakes to sound of name, declines working w/ therapy this afternoon stating \"I did that stuff already today\". Pt encouraged to participate but states he is tired & requests to sleep. Will resume w/ POC tomorrow.             [] Pt Unavailable due to:          JENNIFER Asencio,OTR/L Date: 2019

## 2019-05-15 NOTE — CONSULTS
Palliative Care Notes    Reason for Consult:  goals of care and chronic disease support--CHF, CKD, DM    Patient Active Problem List   Diagnosis    CAD (coronary artery disease)    Type 2 diabetes mellitus with stage 2 chronic kidney disease, with long-term current use of insulin ([facility])    Hyperlipemia    Shortness of breath    Normocytic hypochromic anemia    Chronic kidney disease, stage III (moderate) ([facility])    Vitamin D deficiency disease    Essential hypertension    Acute on chronic systolic CHF (congestive heart failure) ([facility] [address]    GIB (gastrointestinal bleeding)    Blood in stool    Ischemic cardiomyopathy    Hypoglycemia       Advance Directives:  Code status: Full Code  Patient has capacity for medical decisions: yes  Health Care Power of : no  Living Will: no        Pain Management:  Pt complains of hip pain after falling. Receiving tramadol q 4 h prn pain. States that it \"helps some\"    Symptom Management:  Are there any other symptoms that are distressing to the patient or family that needs addressed? Anxiety:  has ativan ordered prn                          Dyspnea:  acute dyspnea, chronic dyspnea and non-productive cough                          Fatigue:  exercise intolerance                          Bladder function:  cruz catheter in place                          Bowel function:  fair appetite    Other:  none     Spiritual history/needs:   notified: n/a-- visits routinely    Palliative Performance Scale:  ___70%  Ambulation reduced; Some disease; Can't do normal job or work; intake normal or reduced; can do full self care; LOC full  ___60%  Ambulation reduced; Significant disease; Can't do hobbies/housework; intake normal or reduced; occasional assist; LOC full/confusion  _x__50%  Mainly sit/lie;  Extensive disease; Can't do any work; Considerable assist; intake normal or reduced; LOC full/confusion  ___40%  Mainly in bed; Extensive disease; Mainly assist; intake normal or reduced; LOC full/confusion   ___30%  Bed Bound; Extensive disease; Total care; intake reduced; LOCfull/confusion  ___20%  Bed Bound; Extensive disease; Total care; intake minimal; Drowsy/coma  ___10%  Bed Bound; Extensive disease; Total care; Mouth care only; Drowsy/coma  ___0       Death    1 year Mortality Risk %:    31%  Readmission Risk Score: 33    Notes:   Teofilo Calderon is sitting on the edge of the bed during my visit. Attempted to see pt earlier today and he requested that I come back later as he wanted to try to rest.  He tells me that he is doing fine. Not open to discussion but will answer questions. Discussed that earlier he appeared short of breath and he tells me that he is doing OK. Inquired about his management of his diabetes. He states that he avoids sugars. His wife does most of the cooking. He is not aware of carb counting. Thinks he watches portions of foods. States appetite has not been as good as usual.  Does monitor his blood sugar at home. Also watches the sodium in his diet and weighs himself everyday. Keeps a log of all of this information. (Noted to be going to cardiac rehab and CHF clinic.)  Was brought to the ED as he had passed out on his porch due to a low blood sugar. States he had taken his medication and hadn't eaten. Does not know what medications he takes. Taking insulin humulin 70/30. Tells me that he has had diabetes education in the past.  A1C is 6.5%. Pt has CKD and was seen by nephrology this admission to assist with fluid weight gain. Has not made much progress during his admission. Nephrology adjusting medication. Pt tells me that he is tired most of the time. Spends most of his day sitting on his porch. Does help with some of the cooking. ACP discussion. Pt has been resistant to completing advance directives. Not interested in completion at this time. States that his wife \"knows everything\".   Did not address DNR status at this point as pt suddenly tells me \"I need to lay down\". Assisted back into bed and is not open to further discussion. Information given on Heart Failure Zones, Diabetes Zones,  Palliative Care brochure and my contact information. Will follow and support. Results for Kimmy Dick (MRN [id number]) as of 5/15/2019 13:46   Ref. Range 5/15/2019 05:40   Sodium Latest Ref Range: 135 - 144 mmol/L 133 (L)   Potassium Latest Ref Range: 3.7 - 5.3 mmol/L 4.8   Chloride Latest Ref Range: 98 - 107 mmol/L 96 (L)   CO2 Latest Ref Range: 20 - 31 mmol/L 23   BUN Latest Ref Range: 8 - 23 mg/dL 90 (H)   Creatinine Latest Ref Range: 0.70 - 1.20 mg/dL 2.43 (H)   Bun/Cre Ratio Latest Ref Range: 9 - 20  37 (H)   Anion Gap Latest Ref Range: 9 - 17 mmol/L 14   GFR Non- Latest Ref Range: >60 mL/min 26 (L)        Palliative Care Plan:  Education/support to patient  Continue with current plan of care  Code status clarified: Full Code  Pain management for comfort  Medications to decrease non-pain symptoms  Palliative Care Goals:  improve or maintain function/quality of life, remain at home, preserve independence/autonomy/control and support for family/caregiver  Visit focus:  Routine meeting  Discuss goals of care  Listen to patient/family concerns  Interdiscplinary collaboration  Build trust  Elicit patient's goals and values, and use these to establish or modify goals of care     Cinthya Hart RN, [facility] and [facility] Nurse Coordinator  5/15/2019 1:52 PM

## 2019-05-15 NOTE — PROGRESS NOTES
Met briefly with dtr Svetlana Johnson, she states family has discussed pt going to the [facility] for a few days if needed, pt's spouse is having minor foot surgery tomorrow and will be unable to care for pt until next Wednesday - referral sent to the [facility] for review. SW attempts to meet with pt who is soundly sleeping.       CHRISTIANO Garcia  5/14/2019

## 2019-05-15 NOTE — PROGRESS NOTES
8\" (172.7 cm)   · Current Body Wt: 211 lb 6.4 oz (95.9 kg)  · Admission Body Wt: 207 lb (93.9 kg)  · Usual Body Wt: (low of 184# in March and 199.2# in April)  · % Weight Change:  ,  2.2% acute gain since admission(+ 2 pitting R/L LE, R/L UE, and  generalized )  · Ideal Body Wt: 154 lb (69.9 kg), % Ideal Body 137%  · BMI Classification: BMI 30.0 - 34.9 Obese Class I  Recent Labs     05/13/19  0553 05/14/19  0510 05/15/19  0540    131* 133*   K 5.1 5.1 4.8   CL 96* 93* 96*   CO2 22 20 23   BUN 77* 90* 90*   CREATININE 2.66* 2.51* 2.43*   GLUCOSE 226* 210* 275*   GFR       NOT REPORTED       NOT REPORTED       NOT REPORTED      Lab Results   Component Value Date    LABALBU 4.5 05/02/2019    LABALBU 4.1 05/17/2012      Nutrition Interventions:   Continue current diet  Continued Inpatient Monitoring, Education declined, Coordination of Care    Nutrition Evaluation:   · Evaluation: Progressing toward goals   · Goals: PO >75% with consistent carbohydrates    · Monitoring: Meal Intake, Pertinent Labs, Weight, I&O      Electronically signed by Lynnette Haile RD, LD on 5/15/19 at 11:48 AM    Contact Number: [phone]

## 2019-05-15 NOTE — PROGRESS NOTES
Physical Therapy   [facility]  Physical Therapy    Date: 5/15/2019  Patient Name: Rhonda Mejia        : 1940       [x] Pt Refusal Patient in bed upon arrival.  He refuses therapy at present time. States he feel last night as he tried to get up by himself and his L hip is hurting. Would like therapy to come back as he feels he needs a nap. Will check back and progress as able. [] Pt Unavailable due to:           Theodoro Lefort, PTA Date: 5/15/2019

## 2019-05-15 NOTE — PROGRESS NOTES
Patient and belongings moved to room 268, closer to the nurse's station. C/o left hip pain 6/10, states it is a chronic pain and not related to previous note.

## 2019-05-15 NOTE — PROGRESS NOTES
[facility]  Occupational Therapy    Date: 5/15/2019  Patient Name: Gonzalo Daley        : 1940       [x] Pt Refusal Pt in bed upon arrival. Refuses to get up to chair for lunch at this time. Will attempt OT session this afternoon.            [] Pt Unavailable due to:          JUAN Garrett Date: 5/15/2019

## 2019-05-15 NOTE — PROGRESS NOTES
[facility]  [facility]    Date: 5/15/2019  Patient Name: Jenifer Bhakta        : 1940       [x] Pt Refusal Patient in bed sleeping upon arrival to room. He is easily awakened, however he refuses to work with therapy due to his hip hurting. Will check back in the morning and progress as able. [] Pt Unavailable due to:           Dione Myers PTA Date: 5/15/2019

## 2019-05-15 NOTE — PROGRESS NOTES
Pt resting in bed with eyes closed, respirations are even and unlabored. Personal pull alarm place for safety. Call light and bedside table within reach.

## 2019-05-15 NOTE — PROGRESS NOTES
Hospitalist Progress Note  5/15/2019 9:01 AM  Subjective:   Admit Date: 5/8/2019  PCP: Babs Pedraza MD    Interval History:   The patient complains of shortness of breath this morning, it improved after breathing treatment. He denies chest pain, abdominal pain. Complaints of pain in his hip. His wife and daughter present in the room and updated about the patient's plan of care. All questions answered. Patient's weight is the same as yesterday.       Diet: DIET CARB CONTROL; Low Sodium (2 GM)  Medications:   Scheduled Meds:   methylPREDNISolone  40 mg Intravenous Daily    bumetanide  2 mg Intravenous BID    vancomycin  1,250 mg Intravenous Q36H    vancomycin        vancomycin        guaiFENesin  600 mg Oral BID    ipratropium-albuterol  1 ampule Inhalation Q4H WA    vancomycin (VANCOCIN) intermittent dosing (placeholder)   Other RX Placeholder    levofloxacin  750 mg Intravenous Q48H    vitamin C  500 mg Oral Daily    aspirin EC  81 mg Oral Daily    carvedilol  12.5 mg Oral BID    DULoxetine  20 mg Oral Daily    famotidine  20 mg Oral Daily    ferrous sulfate  325 mg Oral BID    fluticasone  1 spray Nasal Daily    hydrALAZINE  100 mg Oral 3 times per day    isosorbide mononitrate  60 mg Oral BID    NIFEdipine  60 mg Oral BID    simvastatin  40 mg Oral QPM    sodium bicarbonate  650 mg Oral BID    spironolactone  25 mg Oral Daily    sodium chloride flush  10 mL Intravenous 2 times per day    heparin (porcine)  5,000 Units Subcutaneous BID    insulin lispro  0-6 Units Subcutaneous TID WC    insulin lispro  0-3 Units Subcutaneous Nightly     Continuous Infusions:   dextrose 100 mL/hr (05/12/19 1447)     PRN Medications: acetaminophen, traMADol, zolpidem, sodium chloride flush, magnesium hydroxide, ondansetron, glucose, dextrose, glucagon (rDNA), dextrose, LORazepam    Objective:   Vitals: /67   Pulse 72   Temp 98 °F (36.7 °C)   Resp 22   Ht 5' 8\" (1.727 m)   Wt 211 lb 6.4 oz (95.9 kg)   SpO2 95%   BMI 32.14 kg/m²   BMI: Body mass index is 32.14 kg/m². CBC:   Recent Labs     05/13/19  0553 05/15/19  0540   WBC 8.5 9.4   HGB 8.6* 8.8*    317     BMP:    Recent Labs     05/13/19  0553 05/14/19  0510 05/15/19  0540    131* 133*   K 5.1 5.1 4.8   CL 96* 93* 96*   CO2 22 20 23   BUN 77* 90* 90*   CREATININE 2.66* 2.51* 2.43*   GLUCOSE 226* 210* 275*       Physical Exam:     General Appearance: Up in chair eating breakfast, alert and oriented to person, place and time, in no acute distress  Cardiovascular: normal rate, regular rhythm, normal S1 and S2, no murmurs  Pulmonary/Chest:  diminished breath sounds at bases bilaterally, no wheezing, no rhonchi  Abdomen: soft, non-tender, normal bowel sounds   Extremities: Lower extremities with Ace wraps, arms swollen  Skin: warm and dry, no rash or erythema  Neurological: alert, oriented, normal speech, no focal findings or movement disorder         Assessment and Plan:        1.  Acute on chronic systolic CHF, EF 32% per 2D echo - on IV Bumex, did not improve with dobutamine drip. Continues to be symptomatic. 2.Bilateral moderate size pleural effusions, fluid overload  - continue on IV Bumex, spironolactone, appreciate dr. Arvin Saxena input  3.  Questionable pneumonia versus bronchitis with hypoxia-  on IV Levaquin,  on nebulizers. .  IV steroids. Continue on oxygen supplement  4.  Acute on chronic kidney disease stage 4 - likely due to diuretics,  nephrology following  5.  Chronic anemia which is iron deficiency and CKD - on iron replacement. 6.  Diabetes mellitus type 2 - patient had episodes of hypoglycemia, insulin 70/30 discontinued, managing with  sliding scale short-acting insulin  7.  Deconditioning, generalized weakness - PT and OT for evaluation  8. Status post bypass surgery in 2008 at [facility] [place]. Vincent's with KAPOOR to the LAD, a vein  graft to the OM, and a vein graft to right coronary artery   9.   Chronic hip pain -on prn tramadol         Patient continues to require inpatient admission related to need for further treatment of CHF, fluid overload, close monitoring of renal function, nephrologist evaluation      Electronically signed by Rg León MD on 5/15/2019 at 9:01 AM    [facility] [place] Hospitalist

## 2019-05-16 NOTE — PROGRESS NOTES
[facility]  Occupational Therapy  Daily Note  Date: 2019  Patient Name: Merna Ambrocio        MRN: [id number]    : 1940  (70 y.o.)    Subjective: Pt in bed upon arrival.    Objective  Supine to Sit: Minimal assistance         Sit to stand: Contact guard assistance  Stand to sit: Contact guard assistance           Assessment  Assessment: Pt in bed upon arrival and agreeable to OT session in therapy room this afternoon. Pt requires MIN A for bed mobility supine to sit and CGA for transfers and functional mobility. No LOB during functional mobility with cane but does become SOB and fatigued; pt wheeled remainder of distance to therapy room. Pt states he typically walks further than this at home. Engages in B UE exercise via tabletop arm bike and 3# dowel to increase overall strength and endurance to return to PLOF. Pt requires several rest breaks throughout activity due to SOB. Maintians O2 >90% on 2L O2 throughout session. Returns to room via w/c and agreeable to sit up in bedside chair. Call light in reach and alarm on, nurse notified.   Prognosis: Fair  Discharge Recommendations: Home with assist PRN              Exercises:  See Flowsheets    Goals  Short Term Goals  Time Frame for Short term goals: 5 days (19)  Short term goal 1: Pt to tolerate SAHRA UB activity x 30 min w/ 3 or fewer rest breaks while maintaining O2 sats @ 90 or above  Short term goal 2: Pt to complete toileting tasks SBA  Short term goal 3: Pt to complete item retrival activity while demonstrating good safey awareness w/ FWW/Cane at [facility] 346 term goal 4: Pt to be educated on EC/WS & PLB strategies/techniques to minimize SOB & fatigue w/ daily activities  Short term goal 5: Pt to tolerate static/dynamic standing UB functional activity x 3-5 min w/o LOB or SOB & 2 or fewer rest breaks while maintaining O2 sats @ 90 or above       Time In: 1403  Time Out: 1445  Timed Coded Minutes: 42  Total Treatment Time: 1500 [address]anne[address]

## 2019-05-16 NOTE — PROGRESS NOTES
[facility]  Physical Therapy    Date: 2019  Patient Name: Ángel Meza        : 1940       [] Pt Refusal           [x] Pt Unavailable due to:  Patient still eating breakfast.  Will check back and progress as able.         Linda Quinteros, PTA     Date: 2019

## 2019-05-16 NOTE — PROGRESS NOTES
Assisted patient to sit up at the side of the bed. No s/s of distress. States \" I feel better today. \" Call light in reach. Personal alarm on. Will continue to monitor.

## 2019-05-16 NOTE — PROGRESS NOTES
NEPHROLOGY PROGRESS NOTE    [facility]      Patient Name: Valjean Alpers Date: 2019  8:06 PM  MR #: [id number]  Acct #: [de-identified]  : 1940      Subjective:  No events overnight. Doing better. Review of Systems:    ROS otherwise reviewed and negative      Physical Examination:    Vitals: /69   Pulse 73   Temp 97.7 °F (36.5 °C) (Oral)   Resp 20   Ht 5' 8\" (1.727 m)   Wt 211 lb 14.4 oz (96.1 kg)   SpO2 95%   BMI 32.22 kg/m²   Intake/ Output last 3 shifts:     Intake/Output Summary (Last 24 hours) at 2019 1556  Last data filed at 2019 1400  Gross per 24 hour   Intake 720 ml   Output 2260 ml   Net -1540 ml    I/O last 3 completed shifts: In: 720 [P.O.:720]  Out: 2260 [Urine:2260]     General: No acute distress  HEENT: Anicteric  Neck: Supple  CV: Regular rate, no murmurs or rubs.  1+ lower extremity edema  Lungs: CTA B  Abd: Soft, nontender, bowel sounds present  Extr: good LE perfusion, no cyanosis  Neuro: No myoclonus, alert and oriented x 3  Skin : No new rashes     Results/ Medications reviewed 2019, 3:56 PM     Laboratory, Microbiology, Pathology, Radiology, Cardiology, Medications and Transcriptions reviewed  Scheduled Meds:   bumetanide  2 mg Intravenous BID    guaiFENesin  600 mg Oral BID    ipratropium-albuterol  1 ampule Inhalation Q4H WA    levofloxacin  750 mg Intravenous Q48H    vitamin C  500 mg Oral Daily    aspirin EC  81 mg Oral Daily    carvedilol  12.5 mg Oral BID    DULoxetine  20 mg Oral Daily    famotidine  20 mg Oral Daily    ferrous sulfate  325 mg Oral BID    fluticasone  1 spray Nasal Daily    hydrALAZINE  100 mg Oral 3 times per day    isosorbide mononitrate  60 mg Oral BID    NIFEdipine  60 mg Oral BID    simvastatin  40 mg Oral QPM    sodium bicarbonate  650 mg Oral BID    spironolactone  25 mg Oral Daily    sodium chloride flush  10 mL Intravenous 2 times per day    heparin (porcine)  5,000 Units Subcutaneous BID    insulin lispro  0-6 Units Subcutaneous TID     insulin lispro  0-3 Units Subcutaneous Nightly     Continuous Infusions:   dextrose 100 mL/hr (05/12/19 1447)       CBC:   Recent Labs     05/15/19  0540   WBC 9.4   RBC 3.16*   HGB 8.8*   HCT 27.0*   MCV 85.4   RDW 16.5*        BMP:   Recent Labs     05/14/19  0510 05/14/19  0524 05/15/19  0540 05/16/19  0555   *  --  133* 133*   K 5.1  --  4.8 4.1   CL 93*  --  96* 95*   CO2 20  --  23 23   PHOS  --  4.7*  --   --    BUN 90*  --  90* 83*   CREATININE 2.51*  --  2.43* 2.28*   CALCIUM 9.0  --  8.8 8.8     BNP: No results for input(s): BNP in the last 72 hours. PT/INR: No results for input(s): PROTIME, INR in the last 72 hours. APTT: No results for input(s): APTT in the last 72 hours. CARDIAC ENZYMES: No results for input(s): CKTOTAL, CKMB, CKMBINDEX, TROPONINT in the last 72 hours. FASTING LIPID PANEL:  Lab Results   Component Value Date    CHOL 154 02/04/2019    HDL 41 02/04/2019    TRIG 326 (H) 02/04/2019     LIVER PROFILE: No results for input(s): AST, ALT, ALB, BILIDIR, BILITOT, ALKPHOS in the last 72 hours. UA: No results for input(s): Joanna Rough, PHUR, LABCAST, WBCUA, RBCUA, MUCUS, TRICHOMONAS, YEAST, BACTERIA, CLARITYU, SPECGRAV, LEUKOCYTESUR, UROBILINOGEN, BILIRUBINUR, BLOODU, LABGLUC, KETUA, PROTEINU, CHARUR, EPIU, CRYST, RENEPI in the last 72 hours. Results for Ruthann Mo (MRN [id number]) as of 5/16/2019 15:56   Ref.  Range 5/16/2019 05:55   Sodium Latest Ref Range: 135 - 144 mmol/L 133 (L)   Potassium Latest Ref Range: 3.7 - 5.3 mmol/L 4.1   Chloride Latest Ref Range: 98 - 107 mmol/L 95 (L)   CO2 Latest Ref Range: 20 - 31 mmol/L 23   BUN Latest Ref Range: 8 - 23 mg/dL 83 (H)   Creatinine Latest Ref Range: 0.70 - 1.20 mg/dL 2.28 (H)   Bun/Cre Ratio Latest Ref Range: 9 - 20  36 (H)   Anion Gap Latest Ref Range: 9 - 17 mmol/L 15   GFR Non- Latest Ref Range: >60 mL/min 28 (L)   GFR  Latest Ref Range: >60

## 2019-05-16 NOTE — PROGRESS NOTES
Phone: Kinjal  Date: 2019  Fax: [phone]      Physical Therapy    Daily Note    Patient Name: Rhonda Mejia      : 1940  (85 y.o.)  MRN: [id number]     [x] Patient requires additional Physical Therapy    [] Anticipate Physical Therapy Discharge Soon     Assessment          Sit to Stand: Independent  Stand to sit: Independent                Ambulation 1  Surface: level tile  Device: Single point cane  Other Apparatus: [email or web address])  Assistance: Stand by assistance  Quality of Gait: steady but does fatigue signficantly with increased SOB . Patient uses cane for support  Distance: 60-70 ft x2  Comments: Mod SOB during gait even with O@. REquries 1 standing rest break during gait. Assessment: Patient seated at edge of bed upon arrival to room. He does agree to work with PTA. Once he has recieved his morning meds, he transfers to standing from sitting at edge of bed independently. Gait in hallway with straight cane 60-70 ft x2 without LOB. He does requries 1 rest break while ambulating due to fatigue and SOB. Returns to room to sit up in chair and is able to complete seated exercises as outlined above to promote B LE strength. Remains up in chair following with call light in reach.   Discharge Recommendations: Home with assist PRN  Safety Devices  Type of devices: Call light within reach, Left in chair, Nurse notified          Time In: 2985  Time Out: 0937  Timed Coded Minutes: 33  Total Treatment Time: 33    Exercises:  See Flowsheets    Plan  Cont Per Plan Of Care    Goals  Short Term Goals  Time Frame for Short term goals: 5 days - expires 19  Short term goal 1: LE strength 4+-5/5 to complete reciprical stair ambulation to enter/exit home  Short term goal 2: Good endurance and LE strength to complete fucntional ambulation of 100 ft x 2 without fatigue and decreased balance increasing falls risk  Short term goal 3: Monitor 02 sats with progressive reduction of 02 and maintain > 90% to return home          [address] Number: PTA    Date: 5/16/2019

## 2019-05-16 NOTE — PROGRESS NOTES
Hospitalist Progress Note  5/16/2019 7:54 AM  Subjective:   Admit Date: 5/8/2019  PCP: Dmitry Culp MD    Interval History:     The patient states that he feels much better today, less shortness of breath, denies coughing or wheezing. He also denies having any chest pain, nausea/vomiting. He diuresed around 1500 mL last night. His weight is the same today. Renal function is improving. Patient's appetite is good    Diet: DIET CARB CONTROL; Low Sodium (2 GM)  Medications:   Scheduled Meds:   bumetanide  2 mg Intravenous BID    vancomycin  1,250 mg Intravenous Q36H    guaiFENesin  600 mg Oral BID    ipratropium-albuterol  1 ampule Inhalation Q4H WA    vancomycin (VANCOCIN) intermittent dosing (placeholder)   Other RX Placeholder    levofloxacin  750 mg Intravenous Q48H    vitamin C  500 mg Oral Daily    aspirin EC  81 mg Oral Daily    carvedilol  12.5 mg Oral BID    DULoxetine  20 mg Oral Daily    famotidine  20 mg Oral Daily    ferrous sulfate  325 mg Oral BID    fluticasone  1 spray Nasal Daily    hydrALAZINE  100 mg Oral 3 times per day    isosorbide mononitrate  60 mg Oral BID    NIFEdipine  60 mg Oral BID    simvastatin  40 mg Oral QPM    sodium bicarbonate  650 mg Oral BID    spironolactone  25 mg Oral Daily    sodium chloride flush  10 mL Intravenous 2 times per day    heparin (porcine)  5,000 Units Subcutaneous BID    insulin lispro  0-6 Units Subcutaneous TID WC    insulin lispro  0-3 Units Subcutaneous Nightly     Continuous Infusions:   dextrose 100 mL/hr (05/12/19 1447)     PRN Medications: traMADol, acetaminophen, zolpidem, sodium chloride flush, magnesium hydroxide, ondansetron, glucose, dextrose, glucagon (rDNA), dextrose, LORazepam    Objective:   Vitals: BP (!) 131/59   Pulse 75   Temp 97.8 °F (36.6 °C) (Oral)   Resp 17   Ht 5' 8\" (1.727 m)   Wt 211 lb 14.4 oz (96.1 kg)   SpO2 97%   BMI 32.22 kg/m²   BMI: Body mass index is 32.22 kg/m².     CBC:   Recent Labs 05/15/19  0540   WBC 9.4   HGB 8.8*        BMP:    Recent Labs     05/14/19  0510 05/15/19  0540 05/16/19  0555   * 133* 133*   K 5.1 4.8 4.1   CL 93* 96* 95*   CO2 20 23 23   BUN 90* 90* 83*   CREATININE 2.51* 2.43* 2.28*   GLUCOSE 210* 275* 265*     Physical Exam:        General Appearance:  alert and alert, cooperative, in no distress  Cardiovascular: normal rate, regular rhythm, normal S1 and S2, no murmurs  Pulmonary/Chest: Better air movement today, no wheezing, no rhonchi  Abdomen: soft, non-tender, normal bowel sounds   Extremities: Lower extremities with Ace wraps, swelling in the  arms decreased  Skin: warm and dry, no rash or erythema  Neurological: alert, oriented, normal speech, no focal findings or movement disorder         Assessment and Plan:        1.  Acute on chronic systolic CHF, EF 16% per 2D echo - continue on IV Bumex. Clinically improving, oxygenation also improved, urine output per last shift about 1500ml  Await nephrology further recommendations. 2.Bilateral moderate size pleural effusions, fluid overload  -  on IV Bumex, spironolactone, a  3.  Questionable pneumonia versus bronchitis with hypoxia-  on IV Levaquin, discontinue IV vancomycin, continue  on nebulizers. Steroids stopped. Continue on oxygen supplement  4.  Acute on chronic kidney disease stage 4 - . Renal function improving,  nephrology following  5.  Chronic anemia which is iron deficiency and CKD - on iron replacement. 6.  Diabetes mellitus type 2 - patient had episodes of hypoglycemia, insulin 70/30 discontinued, managing with  sliding scale short-acting insulin  7.  Deconditioning, generalized weakness - PT and OT for evaluation, plan is to discharge to [facility] for rehab  8.  Status post bypass surgery in 2008 at [place] with KAPOOR to the LAD, a vein  graft to the OM, and a vein graft to right coronary artery   9.   Chronic hip pain -on prn tramadol      Patient continues to require inpatient admission related to further treatment of CHF with IV diuretics,close monitoring of renal function, electrolytes, nephrologist evaluation      Electronically signed by Latasha Pacheco MD on 5/16/2019 at 7:54 AM    Rounding Hospitalist

## 2019-05-17 PROBLEM — R53.81 PHYSICAL DECONDITIONING: Status: ACTIVE | Noted: 2019-01-01

## 2019-05-17 NOTE — PROGRESS NOTES
[facility] BED ORIENTATION    Patient Name: Maureen Ojeda  : 1940   Gender: male   Diagnosis:  Hypoglycemia [E16.2]  Acute on chronic systolic CHF (congestive [facility] ([facility] [I50.23] MRN: [id number]     [x] Goal is to provide you with very good care    [x] Insurance and Financial Responsibilities    [x] Changes to Expect           - Safely encourage you to learn to care for yourself     - Frequency of Doctor's Visits       - Family Training Likely  [x] Visiting Hours:           11:00am - 8:30 pm (or by special arrangement)  [x] Personal Phone Number          Located on Dry-Erase Board   [x] Swing Bed Team Meetings         Family encouraged to attend   [x]  Clothing            Bring what you normally wear  [x]  Prevention of Falls           Put call light on, and wait until OK'd to get up on your own. [x] Therapy Expectations          Do your best to participate  [x]  Advanced Directives                          []  Pt has advanced directives and we have a copy on file                            []  Pt has advanced directives and we do not have a copy on file,  notified and will follow up. [x]   Pt does not have advanced directives.   [x] Educated on [employer]           Address provided for direct to hospital service                                                                                                                                                                                                                                                                                       Orientation Completed and agreed upon with Maureen Ojeda and/or Family Members

## 2019-05-17 NOTE — PROGRESS NOTES
SWINGBED PATIENT ACTIVITY PROGRAM ASSESSMENT    Patient Name: Ansuh Couch  : 1940   Gender: male   Diagnosis:  Hypoglycemia [E16.2]  Acute on chronic systolic CHF (congestive heart failure) ([facility].) [I50.23] MRN: [id number]     Ability to read or write? [x] Yes   [] No  Ability to hear? [x] Yes   [] No  Is patient confused? [] Yes   [x] No    Enter Codes in Boxes Codin. Very Important  2. Somewhat important  3. Not very important  4. Not important at all  5. Important but cant do or no choice  6. No response or non-responsive   1 A. How important is it to you to have books, newspapers, and magazines to read?   2 B. How important is it to you to listen to music you like?   2 C. How important is it to you to be around animals such as pets?   1 D. How important is it to you to keep up with the news?   2 E. How important is it to you to do things with groups of people?   2 F. How important is it to you to do your favorite activities?   1 G. How important is it to you to go outside to get fresh air when the weather is good?   1 H. How important is it to you to participate in [employer] services or practices? Activity Care Plan: Will participate in activity programs of choice daily with no decline throughout SBU stay.           EVALUATORS SIGNATURE:  Richard Stapleton  Date: 2019

## 2019-05-17 NOTE — PROGRESS NOTES
NEPHROLOGY PROGRESS NOTE    [facility]      Patient Name: aSndip Durand Date: 2019  8:06 PM  MR #: [id number]  Acct #: [de-identified]  : 1940      Subjective:  Denies any new complaint    Review of Systems:    ROS otherwise reviewed and negative      Physical Examination:    Vitals: BP (!) 147/66   Pulse 78   Temp 97.5 °F (36.4 °C) (Oral)   Resp 18   Ht 5' 8\" (1.727 m)   Wt 216 lb 4.8 oz (98.1 kg)   SpO2 94%   BMI 32.89 kg/m²   Intake/ Output last 3 shifts:     Intake/Output Summary (Last 24 hours) at 2019 1324  Last data filed at 2019 1254  Gross per 24 hour   Intake 600 ml   Output 3775 ml   Net -3175 ml    I/O last 3 completed shifts: In: 5 [P.O.:720]  Out: 2725 [Urine:2725]     General: No acute distress  HEENT: Anicteric  Neck: Supple  CV: Regular rate, no murmurs or rubs.  1+ lower extremity edema  Lungs: CTA B  Abd: Soft, nontender, bowel sounds present      Results/ Medications reviewed 2019, 1:24 PM     Laboratory, Microbiology, Pathology, Radiology, Cardiology, Medications and Transcriptions reviewed  Scheduled Meds:   bumetanide  2 mg Oral BID    guaiFENesin  600 mg Oral BID    ipratropium-albuterol  1 ampule Inhalation Q4H WA    levofloxacin  750 mg Intravenous Q48H    vitamin C  500 mg Oral Daily    aspirin EC  81 mg Oral Daily    carvedilol  12.5 mg Oral BID    DULoxetine  20 mg Oral Daily    famotidine  20 mg Oral Daily    ferrous sulfate  325 mg Oral BID    fluticasone  1 spray Nasal Daily    hydrALAZINE  100 mg Oral 3 times per day    isosorbide mononitrate  60 mg Oral BID    NIFEdipine  60 mg Oral BID    simvastatin  40 mg Oral QPM    sodium bicarbonate  650 mg Oral BID    spironolactone  25 mg Oral Daily    sodium chloride flush  10 mL Intravenous 2 times per day    heparin (porcine)  5,000 Units Subcutaneous BID    insulin lispro  0-6 Units Subcutaneous TID WC    insulin lispro  0-3 Units Subcutaneous Nightly     Continuous pneumonia; Cr on admission was 2.2, peak at 2.5mg/dl and slowly improving. Still hypervolemic, but is improving. Recommend keeping on bumex 2 mg BID and still has good urine output. No need for RRT at this time   2. CKD IV with baseline Cr 2mg/dl: From HTN   3. Decompensated HF with EF 35%  4. Hypertension: BP improving. 5. Hypermagnesemia: Mg still elevated but improving. Should keep on improving with the diuretics  6. Multilobar pneumonia: Now off steroids. BUN should slowly improve. 7. Anemia: will need to start KRISTAL (as outpatient). On po iron. No IV iron given systemic infection.      Will make follow up with Dr. Shan Kraft in our [facility] office on May 24 @ 4:00    Electronically signed by Liat Mckeon MD on 5/17/2019 at 1:24 PM

## 2019-05-17 NOTE — PROGRESS NOTES
Pt requested not to be woke up during the night for breathing txs. He said he would call if he needs one.

## 2019-05-17 NOTE — PROGRESS NOTES
Pt has been awake in room all shift long, alternating between laying in bed and dangling at the side. Chery has drained clear, yellow/mariana urine without incident this shift with approximately 2025 mL out. Will continue to monitor.

## 2019-05-17 NOTE — PROGRESS NOTES
[facility]  Occupational Therapy  Daily Note  Date: 2019  Patient Name: Zay Napier        MRN: [id number]    : 1940  (70 y.o.)    Subjective: Pt in bed upon arrival.    Objective  Sit to stand: Contact guard assistance  Stand to sit: Contact guard assistance              Assessment  Assessment: Pt seen with PTA this date due to fatigue. Pt continues to be on 1L of O2 this afternoon. Continues to require CGA for all transfers and functional mobility. Ambulates in reardon per PTA with LOB, self-corrected. Engages in B UE strengthening/endurance activities in therapy room (3# dowel and nustep) to increase activity tolerance in order to return home safely. Pt demos decreased activity tolerance due to fatigue and SOB. Tolerates nustep x3 min with rest breaks after 1 min increments. Pt's O2 briefly drops to 88% x1 occasion but raises >90% within less than 15 seconds. Maintains SPO2 >90% at 1L of O2 remainder of tx session. Returns to room via w/c. Stands to use urinal; SBA from therapist. Pt EOB at conclusion of session, clip alarm on and call light in reach. Denies further needs at this time.   Prognosis: Fair  Discharge Recommendations: Home with assist PRN       Exercises:  See Flowsheets    Goals  Short Term Goals  Time Frame for Short term goals: 5 days (19)  Short term goal 1: Pt to tolerate SHARA UB activity x 30 min w/ 3 or fewer rest breaks while maintaining O2 sats @ 90 or above  Short term goal 2: Pt to complete toileting tasks SBA  Short term goal 3: Pt to complete item retrival activity while demonstrating good safey awareness w/ FWW/Cane at [facility] 346 term goal 4: Pt to be educated on EC/WS & PLB strategies/techniques to minimize SOB & fatigue w/ daily activities  Short term goal 5: Pt to tolerate static/dynamic standing UB functional activity x 3-5 min w/o LOB or SOB & 2 or fewer rest breaks while maintaining O2 sats @ 90 or above       Time In: 1413  Time Out: 1503  Timed Coded Minutes: 22  Total Treatment Time: Adam 99, FOX/L    Date: 5/17/2019

## 2019-05-17 NOTE — PROGRESS NOTES
Nephrology in to see pt, stated one more day of the bumex IV then change to PO and pt can go home,  updated

## 2019-05-17 NOTE — PROGRESS NOTES
[facility]  Swing Bed Evaluation for Certification for [facility] meets skilled criteria due to the need for skilled nursing supervision or skilled rehabilitation services listed below:   [x] Therapy; physical and occupational; for decreased strength, balance and self         care activities. [] Tpn    [x] Respiratory care   [] IV therapy   [] Wound care    [] Other Skilled Need:       Mahogany Ahn lives [] Alone      [x] With Spouse    [] Other:   and plans on returning there at discharge. [x] Pt declines list of alternate providers, pt prefers to receive skilled care at [facility]  [] List of alternate providers given to patient, pt prefers to receive skilled care at [facility]  [] Not applicable, pt admitted to [facility] from [address] prefers this facility for skilled care and services can only be practically provided in a skilled nursing facility or swing bed [facility] on an inpatient basis. Mahogany Ahn will require skilled care on a daily basis beginning 05/17/2019, if medically stable.   These services are for an ongoing condition for which Mahogany Ahn received inpatient care for at the hospital.        Delisa Muñoz,                                                  Date: 05-

## 2019-05-17 NOTE — PROGRESS NOTES
SWING BED PROGRAM PRE-ADMISSION ASSESSMENT  (TRADITIONAL MEDICARE PATIENTS)  Patient Name: Joelle Gibson      : 1940  (21 y.o.) Gender: male   Room: [address] [place] [address] MRN: [id number]      Inpatient Admission Date: 2019 Date & Time of Referral: 2019     Referred By: Ange Olguin MD Referred from:  [x] W    [] Other:     # of Skilled Care Days Used in Last 60 days: 0 Insurance: [x]  Medicare                      [x]  Secondary - Type:     Present Condition/Diagnosis:    Hypoglycemia [E16.2]  Acute on chronic systolic CHF (congestive heart failure) ([facility] [I50.23]      Previous Medical History:   Past Medical History:   Diagnosis Date    CAD (coronary artery disease)     MI on July 15, 2012    CH[facility]congestive heart failure) ([facility])     Chicken pox     Chronic kidney disease     CKD stage 3 due to type 2 diabetes mellitus ([facility]     Heart failure ([facility]     Hypertension     Measles     Mumps     Osteoarthritis     Tracheal obstruction 2012    trach after intubation for MI    Type II or unspecified type diabetes mellitus without mention of complication, not stated as uncontrolled         ADL Performance Last Seven (7) Days (Please Score)   Patients performance over all shifts during last seven (7) days     0 = Independent - No help or oversight  1 = Supervision - Oversight, encouragement or cueing provided  2 = Limited Assist -Highly involved in activity; assistance in guided maneuvering of limbs or other non-weight-bearing assistance  3 = Extensive Assist - Receives physical help in guided maneuvering of limbs or other non-weight bearing assistance  4 = Total Dependence - Full staff performance of activity   7 = Activity occurred only once or twice  8 = Activity did not occur - Activity did not occur or family and/or non- facility staff provided care 100% of the time for that activity over a 7-day period.      SCORE   BED MOBILITY - How client moves to and from lying position, turns side to side, and positions body while in bed 2   TRANSFER - How resident moves between surfaces - to/from: bed, chair, wheelchair, standing position (EXCLUDE to/from bath/toilet) 3   TOILET USE - How client uses the toilet room (or commode, bedpan, urinal);transfers on/off toilet, cleanses, changes pad, manages ostomy or catheter, adjusts clothes 2   EATING - How client eats and drinks (regardless of skill). Includes intake of nourishment by other means (e.g., tube feeding, total parenteral nutrition) 0   Estimated Pre-Admission ADL Value: 7     PATIENT WILL RECEIVE THE FOLLOWING SKILLED SERVICES AS A SWING BED PATIENT:       REHABILITATION (PT/OT/SP)    [] ULTRA HIGH 720 or more minutes minimum per week of at least two (2) disciplines - 1st for at least five (5) days and 2nd for at least three (3) days   [] VERY HIGH 500 or more minutes minimum per week of at least one (1) discipline for at least five (5) days   [] HIGH 325 or more minutes minimum per week of at least one (1) discipline for at least five (5) days   [x] MEDIUM 150 or more minutes minimum per week at least five (5) days of any combination with three (3) therapies   [] LOW Restorative nursing at least six (6) days, two (2) activities, or therapies for at least three (3) days at least forty-five (45) minute per week minimum services. EXTENSIVE SERVICES   [] Tracheostomy Care   [] Ventilator/Respirator   [] Infection Isolation   SPECIAL CARE HIGH   [] MS with ADL greater than or equal to 10  [] Quadriplegic with ADL greater than or equal to 5  [] emphysema/COPD and shortness of breath when lying flat  [] Fever w/at least one (1) of the following: [] dehydration [] pneumonia [] vomiting [] weight loss  [] feeding tube  [] Septicemia  [] Coma (not awake & completely dependent in ADL)  [] Diabetes and injections seven (7) days and Dr. order change two (2) or more days.   [] Parenteral/IV feeding  [x] respiratory therapy for seven (7) days   SPECIAL CARE LOW:   [x] Respiratory Therapy  [] Ulcers (2+sites all stages) w/treatment  [] Multiple Sclerosis  [] Cerebral Palsy  [] Parkinsons Disease  [] Oxygen Therapy  [] Extensive care services w/ADL less than 6  [] Fever w/at least one (1) of the following: [] dehydration [] pneumonia [] vomiting [] weight loss  [] Foot Infection or open lesions on the foot with treatment  [] tube-feeding - calories greater than or equal to 51% or tube feeding with total calories greater than or equal to 26% and fluid parental or enteral intake of greater than or equal to 50 ml per day   CLINICALLY COMPLEX   CURRENTLY:  [] Pneumonia  [] Hemiplegics with ADL with ADL Sum greater than or equal to 10  [x] IV medications delivered post admission in the SNF  [] Surgical wounds or open lesions w/treatment      EVALUATORS SIGNATURE:Tina Mosher DATE: 5/17/2019       [x] ACCEPTED FOR ADMISSION ON:  05/17/2019                              ELOS:  [x] 1-2wks  [] 2-3wks  [] 3-4wks   [] ADMISSION DENIED BASED ON:    [] [address] [place] [address] COORDINATOR

## 2019-05-17 NOTE — PROGRESS NOTES
[facility]  Occupational Therapy  Daily Note  Date: 2019  Patient Name: Jorge Luis Montes De Oca        MRN: [id number]    : 1940  (70 y.o.)    Subjective: Pt seated at EOB upon arrival.    Objective    Balance  Sitting Balance: Supervision  Standing Balance: Stand by assistance  Standing Balance  Time: 7 minutes  Activity: ring tree activity  Comment: no LOB    Sit to stand: Contact guard assistance  Stand to sit: Contact guard assistance        Assessment  Assessment: Per pt's RN, ok to decrease to 1L of O2 for session with sats closely monitored. Pt requires CGA for all transfers and functional mobility this session. Ambulates short distance in reardon; utilizes cane in R hand and hand rails in L. Pt slightly off balance with ambulation with cane. Requires cues for safety. Pt's SPO2 94% on 1L of O2 after ambulation. Pt engages in B UE exercise using 2# dumbbell; tolerates well. Facilitated static standing balance task; pt tolerates x7 minutes with no LOB. Slight increase in SOB but continues to maintain O2 >90% on 1L of O2 via finger pulse ox. Returns to room via w/c and sits up in bedside chair. Pt remains on 1L of O2 at this time per RN. Call light in reach and alarm in place.   Prognosis: Fair  Discharge Recommendations: Home with assist PRN              Exercises:  See Flowsheets    Goals  Short Term Goals  Time Frame for Short term goals: 5 days (19)  Short term goal 1: Pt to tolerate SAHRA UB activity x 30 min w/ 3 or fewer rest breaks while maintaining O2 sats @ 90 or above  Short term goal 2: Pt to complete toileting tasks SBA  Short term goal 3: Pt to complete item retrival activity while demonstrating good safey awareness w/ FWW/Cane at [facility] 346 term goal 4: Pt to be educated on EC/WS & PLB strategies/techniques to minimize SOB & fatigue w/ daily activities  Short term goal 5: Pt to tolerate static/dynamic standing UB functional activity x 3-5 min w/o LOB or SOB & 2 or fewer rest breaks while

## 2019-05-17 NOTE — DISCHARGE SUMMARY
(LOPID) 600 MG tablet  Take 600 mg by mouth daily             HUMULIN 70/30 (70-30) 100 UNIT/ML injection vial  INJECT 50 UNITS EVERY MORNING AND INJECT 40 UNITS EVERY EVENING             hydrALAZINE (APRESOLINE) 100 MG tablet  TAKE 1 TABLET BY MOUTH THREE TIMES DAILY             Insulin Syringe-Needle U-100 (B-D INS SYR ULTRAFINE 1CC/30G) 30G X 1/2\" 1 ML MISC  Use 2 daily   DX: E11.9             isosorbide mononitrate (IMDUR) 60 MG extended release tablet  TAKE 1 TABLET BY MOUTH TWICE DAILY             Multiple Vitamins-Minerals (MULTIVITAMIN ADULT PO)  Take 1 tablet by mouth daily              NIFEdipine (PROCARDIA XL) 60 MG extended release tablet  TAKE 1 TABLET BY MOUTH TWICE DAILY             ONE TOUCH ULTRASOFT LANCETS MISC  Test blood sugar twice daily             simvastatin (ZOCOR) 40 MG tablet  TAKE 1 TABLET BY MOUTH NIGHTLY             sodium bicarbonate 650 MG tablet  Take 1 tablet by mouth 2 times daily             spironolactone (ALDACTONE) 25 MG tablet  Take 1 tablet by mouth daily                 Diet:  DIET CARB CONTROL; Low Sodium (2 GM)    Activity:  Activity as tolerated (Patient may move about with assist as indicated or with supervision.)    Follow-up:  in 24 hrs with hospitalist weeks with hospitalist    Consultants: cardiology Dr. Barak Em                         Nephrology Dr. Danis Rai        Physical Exam:    Page Saint:  Patient Page Saint for the past 24 hrs:   BP Temp Temp src Pulse Resp SpO2 Weight   05/17/19 1701 -- -- -- -- -- 94 % --   05/17/19 1615 (!) 142/70 97.6 °F (36.4 °C) Oral 77 20 92 % --   05/17/19 1254 -- -- -- -- 18 94 % --   05/17/19 0933 -- -- -- -- 18 93 % --   05/17/19 0735 (!) 147/66 97.5 °F (36.4 °C) Oral 78 20 96 % --   05/17/19 0607 -- -- -- -- -- 94 % --   05/17/19 0430 (!) 146/62 98.2 °F (36.8 °C) Oral 72 18 95 % 216 lb 4.8 oz (98.1 kg)   05/16/19 2345 (!) 157/70 98 °F (36.7 °C) Oral 65 18 95 % --   05/16/19 2146 -- -- -- -- -- 94 % --   05/16/19 1913 (!) 140/70 97.8 °F (36.6 °C) Oral 75 20 95 % --     Weight: Weight: 216 lb 4.8 oz (98.1 kg)     24 hour intake/output:    Intake/Output Summary (Last 24 hours) at 5/17/2019 1830  Last data filed at 5/17/2019 1730  Gross per 24 hour   Intake 960 ml   Output 3275 ml   Net -2315 ml      General Appearance:  Sitting in  the bed, alert and alert, cooperative, in no distress  Cardiovascular: normal rate, regular rhythm, normal S1 and S2, no murmurs  Pulmonary/Chest: Clear to auscultation bilaterally,  no wheezing, no rhonchi  Abdomen: soft, non-tender, normal bowel sounds   Extremities: Lower extremities with Ace wraps, swelling in the  arms decreased  Skin: warm and dry, no rash or erythema  Neurological: alert, oriented, normal speech, no focal findings or movement disorder     Hospital Course: The patient is a 71 yo man who presented to ER c/o low blood sugar. His BS was found to be in 35s. He responded well to D50 initially but then his BS trended back down. He hadcno other complaints such as SOB, CP, abd.pain, leg swelling  He had a life vest for h/o cardiomyopathy  Work up in the ER showed normal electrolytes with a BUN of 62 and creatinine of 2.32, not much higher than baseline. CBC showed a normal WBC with Hgb of 9.6, he has chronic anemia. BNP was elevated at 17,854. CXR showed persistent cardiomegaly with diffuse overt Pulmonary edema. Aarti Mattson was given a regular food in the ER with his BS staying in the low 100s. With the worries of a repeat in hypoglycemia Aarti Mattson was admitted as an observation. Due to pulmonary edema Dr. Lucy Best was consulted. The pt was placed in  ICU and started on Dobutamine drip and IV Lasix for CHF exacerbation in the context of worsening renal function. He initially improved but then developed increased cough with sputum production. pt was started on IV Levaquin for presumed bronchitis vs.early pneumonia. The following day steroids were added due to wheezing.   He was developing worsening in renal

## 2019-05-17 NOTE — PROGRESS NOTES
Phone: Kinjal  Date: 2019  Fax: [phone]      Physical Therapy    Daily Note    Patient Name: Cele Phan      : 1940  (70 y.o.)  MRN: [id number]     [x] Patient requires additional Physical Therapy    [] Anticipate Physical Therapy Discharge Soon     Assessment        Supine to Sit: Independent  Sit to Supine: Contact guard assistance(for LE managment)    Sit to Stand: Independent  Stand to sit: Independent                Ambulation 1  Surface: level tile  Device: Single point cane  Other Apparatus: O2, Wheelchair [email or web address])  Assistance: Contact guard assistance  Quality of Gait: steady but does fatigue signficantly with increased SOB . Patient uses cane for support  Distance: 70 ft x1  Comments: fatigue and SOB noted during gait             Assessment: Co-treat with MIGUEL this afternoon. Patient in bed upon arrival and agrees to work with therapies. Supine to sit is supervision/mod I. Sit to stand from bed is supervision/mod I.  Ambulates with straight cane ~60-75 feet and then requests to sit down due to fatigue and SOB. While ambulating in reardon, he sometimes reaches for railing. Able to complete seated exercises per doc flowsheet while on 1 L of O2. Sats are checked throughout and does drop in to the 80's but recovers in ~15sec back to low mid 90's. Wheeled back to room following and requests to lie down. Sit to supine is supervision as he is able to lift B LE's up onto bed 2x. Sits at edge of bed following with call light in reach and alarm attached. Nurse notified.   Discharge Recommendations: Home with assist PRN  Safety Devices  Type of devices: Call light within reach, Chair alarm in place, Gait belt, Left in bed, Nurse notified          Time In: 5960  Time Out: 1503  Timed Coded Minutes: 28 (co-treat with MIGUEL)  Total Treatment Time: 50  Exercises:  See Flowsheets    Plan  Cont Per Plan Of Care    Goals  Short Term Goals  Time Frame for Short term goals: 5 days - expires 5/18/19  Short term goal 1: LE strength 4+-5/5 to complete reciprical stair ambulation to enter/exit home  Short term goal 2: Good endurance and LE strength to complete fucntional ambulation of 100 ft x 2 without fatigue and decreased balance increasing falls risk  Short term goal 3: Monitor 02 sats with progressive reduction of 02 and maintain > 90% to return home          [address] Number: PTA    Date: 5/17/2019

## 2019-05-18 NOTE — PROGRESS NOTES
Phone: Kinjal  Date: 2019  Fax: [phone]      Physical Therapy    Daily Note    Patient Name: Jorge Luis Montes De Oca      : 1940  (13 y.o.)  MRN: [id number]     [x] Patient requires additional Physical Therapy    [] Anticipate Physical Therapy Discharge Soon     Assessment        Supine to Sit: Independent          Sit to Stand: Independent  Stand to sit: Independent                Ambulation 1  Surface: level tile  Device: Single point cane  Other Apparatus: O2, Wheelchair [email or web address])  Assistance: Contact guard assistance  Quality of Gait: steady but does fatigue signficantly with increased SOB . Patient uses cane for support  Distance: 70 ft x1  Comments: fatigue and SOB noted during gait             Assessment: Co-treat with OT this morning. Patient is sitting at edge of bed upon entry. Sit to stand is supervision/mod I. Patient ambulates with SC and CGA to double doors at end of hallway before requesting to ride in w/c rest of way to therapy room. O2 @ 94% on 1L O2 when he gets into therapy room. Patient completes seated LE exercises and standing tolerance x7 min while completing card matching. Patient rode Nu Step x3 min, but took rest break at [facility] point. O2 measured throughout time on Nu Step and O2 never dropped below 91%. After Nu Step patient is wheeled back to his room and returns to the chair. Call light in reach and personal alarm in place.   Discharge Recommendations: Home with assist PRN  Safety Devices  Type of devices: Call light within reach, Chair alarm in place, Gait belt, Left in bed, Nurse notified          Time In: 0940  Time Out: 1479  Timed Coded Minutes: 31 (co treat with OT)  Total Treatment Time: 62    Exercises:  See Flowsheets    Plan  Cont Per Plan Of Care    Goals  Short Term Goals  Time Frame for Short term goals: 5 days - expires 19  Short term goal 1: LE strength 4+-5/5 to complete reciprical stair ambulation to enter/exit home  Short term goal 2: Good endurance and LE strength to complete fucntional ambulation of 100 ft x 2 without fatigue and decreased balance increasing falls risk  Short term goal 3: Monitor 02 sats with progressive reduction of 02 and maintain > 90% to return home          [address] Number: PTA    Date: 5/18/2019

## 2019-05-18 NOTE — H&P
History & Physical    Patient:  Cornel Merino  YOB: 1940  Date of Service: 5/18/2019  MRN: [id number]   Acct:   [de-identified]   Primary Care Physician: Luba Ledesma MD    Chief Complaint: Shortness of breath, generalized weakness    History of Present Illness: The patient is a 69 yo man who initially  presented to ER c/o low blood sugar. His BS was found to be in 35s. He responded well to D50 initially but then his BS trended back down. He hadcno other complaints such as SOB, CP, abd.pain, leg swelling  He had a life vest for h/o cardiomyopathy  Work up in the ER showed normal electrolytes with a BUN of 62 and creatinine of 2.32, not much higher than baseline.  CBC showed a normal WBC with Hgb of 9.6, he has chronic anemia.  BNP was elevated at 17,854. Carey Feller showed persistent cardiomegaly with diffuse overt Pulmonary edema. Brie Tellez was given a regular food in the ER with his BS staying in the low 100s. With the worries of a repeat in hypoglycemia Brie Tellez was admitted as an observation. Due to pulmonary edema Dr. Yeyo Olmedo was consulted. The pt was placed in  ICU and started on Dobutamine drip and IV Lasix for CHF exacerbation in the context of worsening renal function. He initially improved but then developed increased cough with sputum production. pt was started on IV Levaquin for presumed bronchitis vs.early pneumonia. The following day steroids were added due to wheezing. He was developing worsening in renal function. Dobutamine drip was stopped on 5/11. His life vest was discontinued by Dr. Yeyo Olmedo. His 2D echo revealed EF 35%. Also, his lasix was changed to Demadex. Unfortunately he continued to be very SOB , did not have good urine output and continued to gain weight. Dr. Yeyo Olmedo ordered CT abd/pelvis to evaluate for possible ascites. CT revealed moderate BL pleural s  Effusions and third spacing. Nephrology was consulted to help with diuiretic vs, HD recommendations.  Patient's demadex was stopped and he was started on IV Bumex 2 mg bid. Patient's urine output greatly improved, he did not need urgent RRT. IV Bumex was changed to oral .   Patient  Became weak and deconditioned due to multiple acute problems. PT/OT was consulted. He is recommended to continue with therapy to improve his strength  and balance. Patient was agreeable to  stay onswing bed to work with PT,OT and to continue treatment for CHF exacerbation and fluid overload. This morning he feels a lot better. States shortness of breath improving. He denies cough, chest pain, abdominal pain. Reports feeling generally weak. Past Medical History:        Diagnosis Date    CAD (coronary artery disease)     MI on July 15, 2012    [facility]est[facility]     Chicken pox     Chronic kidney disease     CKD stage 3 due to type 2 diabetes mellitus ([address]     Heart failure [facility] [address]     Hypertension     Measles     Mumps     Osteoarthritis     Tracheal obstruction 9/2012    trach after intubation for MI    Type II or unspecified type diabetes mellitus without mention of complication, not stated as uncontrolled        Past Surgical History:        Procedure Laterality Date    CARDIAC CATHETERIZATION Left 10/09/2018    Dr. [facility]    [facility]    EYE SURGERY Bilateral     Cataract removal    TRACHEOTOMY         Home Medications:   No current facility-administered medications on file prior to encounter.       Current Outpatient Medications on File Prior to Encounter   Medication Sig Dispense Refill    sodium bicarbonate 650 MG tablet Take 1 tablet by mouth 2 times daily      bumetanide (BUMEX) 1 MG tablet Take 1 tablet by mouth 2 times daily 60 tablet 11    DULoxetine (CYMBALTA) 20 MG extended release capsule Take 1 capsule by mouth daily 30 capsule 11    spironolactone (ALDACTONE) 25 MG tablet Take 1 tablet by mouth daily 30 tablet 11    famotidine (PEPCID) 20 MG tablet TAKE 1 TABLET BY MOUTH EVERY DAY 90 tablet 1    Blood Glucose Monitoring Suppl (ONE TOUCH ULTRA MINI) w/Device KIT Use to test sugar twice daily 1 kit 0    aspirin 81 MG tablet Take 81 mg by mouth daily      carvedilol (COREG) 12.5 MG tablet Take 1 tablet by mouth 2 times daily 60 tablet 3    ONE TOUCH ULTRASOFT LANCETS MISC Test blood sugar twice daily 100 each 3    blood glucose test strips (ASCENSIA AUTODISC VI;ONE TOUCH ULTRA TEST VI) strip One touch ultra strips. Test blood sugar BID,DIAGNOIS:SM TYPE  each 3    NIFEdipine (PROCARDIA XL) 60 MG extended release tablet TAKE 1 TABLET BY MOUTH TWICE DAILY 180 tablet 1    HUMULIN 70/30 (70-30) 100 UNIT/ML injection vial INJECT 50 UNITS EVERY MORNING AND INJECT 40 UNITS EVERY EVENING 30 mL 5    hydrALAZINE (APRESOLINE) 100 MG tablet TAKE 1 TABLET BY MOUTH THREE TIMES DAILY  11    isosorbide mononitrate (IMDUR) 60 MG extended release tablet TAKE 1 TABLET BY MOUTH TWICE DAILY  5    Insulin Syringe-Needle U-100 (B-D INS SYR ULTRAFINE 1CC/30G) 30G X 1/2\" 1 ML MISC Use 2 daily   DX: E11.9 180 each 10    gemfibrozil (LOPID) 600 MG tablet Take 600 mg by mouth daily      simvastatin (ZOCOR) 40 MG tablet TAKE 1 TABLET BY MOUTH NIGHTLY 90 tablet 1    clopidogrel (PLAVIX) 75 MG tablet TAKE 1 TABLET BY MOUTH DAILY. (Patient taking differently: TAKE 1 TABLET BY MOUTH DAILY.  As of 04/02/2019 will be on HOLD) 90 tablet 1    fluticasone (FLONASE) 50 MCG/ACT nasal spray 1 spray by Nasal route daily Both nostrils daily 1 Bottle 4    Blood Pressure Monitor MISC Please dispense blood pressure monitor per patient's insurance 1 each 0    ferrous sulfate 325 (65 FE) MG tablet Take 325 mg by mouth 2 times daily       Ascorbic Acid (VITAMIN C) 500 MG tablet Take 500 mg by mouth daily      Multiple Vitamins-Minerals (MULTIVITAMIN ADULT PO) Take 1 tablet by mouth daily          Allergies:  Lisinopril    Social noted    Review of Labs and Diagnostic Testing:    Recent Results (from the past 24 hour(s))   Glucose, Whole Blood    Collection Time: 05/17/19 11:48 AM   Result Value Ref Range    POC Glucose 170 (H) 65 - 99 mg/dL   Glucose, Whole Blood    Collection Time: 05/17/19  4:39 PM   Result Value Ref Range    POC Glucose 251 (H) 65 - 99 mg/dL   Glucose, Whole Blood    Collection Time: 05/17/19  9:13 PM   Result Value Ref Range    POC Glucose 264 (H) 65 - 99 mg/dL   Basic Metabolic Panel    Collection Time: 05/18/19  6:15 AM   Result Value Ref Range    Glucose 240 (H) 70 - 99 mg/dL    BUN 79 (H) 8 - 23 mg/dL    CREATININE 2.00 (H) 0.70 - 1.20 mg/dL    Bun/Cre Ratio 40 (H) 9 - 20    Calcium 8.8 8.6 - 10.4 mg/dL    Sodium 135 135 - 144 mmol/L    Potassium 4.1 3.7 - 5.3 mmol/L    Chloride 96 (L) 98 - 107 mmol/L    CO2 24 20 - 31 mmol/L    Anion Gap 15 9 - 17 mmol/L    GFR Non-African American 33 (L) >60 mL/min    GFR African American 40 (L) >60 mL/min    GFR Comment          GFR Staging NOT REPORTED    Glucose, Whole Blood    Collection Time: 05/18/19  8:00 AM   Result Value Ref Range    POC Glucose 203 (H) 65 - 99 mg/dL     Assessment/ Plan:    1.  Acute on chronic systolic CHF, EF 38% per 2D echo -  Clinically improving, on  Bumex 2 mg bid by mouth,  Chery catheter discontinued it yesterday  Continue monitoring I&Os, weight  2. Bilateral moderate size pleural effusions, fluid overload  - continue on Bumex, appreciate nephrology input, no dialysis indicated at this time. 3.  Questionable pneumonia versus bronchitis with hypoxia stop   IV Levaquin, IV vancomycin  stopped 5/16/19 , continue  on nebulizers.   Continue on oxygen supplement  4.  Acute on chronic kidney disease stage 4 -   Renal function improving  5.  Chronic anemia which is iron deficiency and CKD - on iron replacement.    6.  Diabetes mellitus type 2 - patient had episodes of hypoglycemia, insulin 70/30 discontinued, managing with  sliding scale short-acting insulin  7.  Deconditioning, generalized weakness - continue with PT and OT   8.  Hypermagnesemia - resolving   9.  Status post bypass surgery in 2008 at [facility] with KAPOOR to the LAD, a vein  graft to the OM, and a vein graft to right coronary artery   10. .  Chronic hip pain -on prn tramadol     Code status full    DVT prophylaxis:   [] Lovenox   [] SCDs   [x] SQ Heparin   [] Encourage ambulation, low risk for DVT, no chemical or mechanical    prophylaxis necessary      [] Already on Anticoagulation    Anticipated Disposition upon discharge:   [] Home   [] Home with Home Health   [] Ferny Arjun   [] [address]      Electronically signed by Feliberto Burrell MD on 5/18/2019 at 11:30 AM

## 2019-05-18 NOTE — PLAN OF CARE
[facility] Occupational Therapy  Plan of Care  OT Orders Received and Evaluation Complete  Date: 2019  Patient Name: Claire Patel        MRN: [id number]    : 1940  (70 y.o.)  Referring Practitioner: Dr. Rashad Valerio  Onset Date: 19  Referral Date: 19  Diagnosis: CHF  Treatment Diagnosis: weakness    Identified Problem Areas  Assessment: Co-tx w/ PTA this date d/t limited fatigue. Pt seated EOB upon arrival. Completes functional mobility w/ PTA & cane, ambulates w/ w/c follow in hallway. Ambulates short distance to and requires rest break & wheeled rest of way to therapy room. Completes B UE exercises w/ 2# wt 1x15 each w/ fair tolerance. Multiple rest breaks needed d/t fatigue. O2 sats remain in 90s after activity. Completes standing task to address standing balance & endurance for self care. Tolerates standing x 7 min w/o rest break, no LOB and O2 in 90s at end of task. Pt agreeable to demo don/doff R sock, does so w/ SBA but demos SOB w/ activity. Concluded w/ nustep x 3 min w/ 2 rest breaks w/ O2 levels never dropping below 91%. Pt returned to room via w/c & ambulates from doorway to bedside chair w/ CGA & cane. Remains there w/ call light in reach and personal alarms in place.       Justification for Skilled Services:  [x]  Complete Daily Tasks Safely  [x] Improve Balance   [x]  Return to Prior Level of Function  [x]  Family/Caregiver Education    [x] Improve UE strength  [x] Patient Education: [x]Adaptive Equipment   [x]Home Exercise Program and Progression    Treatment Plan  Plan  Times per week: 5x  Times per day: Daily(1-2x daily)  Plan weeks: 5 days  [] Modalities:  [x] Therapeutic Exercise   [x] Therapeutic Activity  [] Splinting:     [] Home Safety Evaluation         [x] ADL Retraining                       [] Muscle Re-education [] Cognitive Retraining            [] Sensory Integration  [x] Patient Education [x] Home Exercise Program [x] Fine Motor Coordination  Discharge Recommendations: Home with assist PRN    GOALS  Short term goals  Time Frame for Short term goals: 5 days (5/22/19)  Short term goal 1: Pt to tolerate SAHRA UB activity x 30 min w/ 3 or fewer rest breaks while maintaining O2 sats @ 90 or above  Short term goal 2: Pt to complete toileting tasks SBA  Short term goal 3: Pt to complete item retrival activity while demonstrating good safey awareness w/ FWW/Cane at [facility] 346 term goal 4: Pt to be educated on AE & EC/WS & PLB strategies/techniques to minimize SOB & fatigue w/ daily activities  Short term goal 5: Pt to complete UB/LB bathing & dressing tasks CGA/Min A w/ use of AE as needed      JENNIFER Bryant,OTR/L                    Date: 5/18/2019

## 2019-05-18 NOTE — PROGRESS NOTES
Pharmacy Note  Levofloxacin Consult    Jannette Cruz is a 70 y.o. male started on Levofloxacin for bronchitis/pneumonia; consult received from Dr. Sole Mera to manage therapy. Patient Active Problem List   Diagnosis    CAD (coronary artery disease)    Type 2 diabetes mellitus with stage 2 chronic kidney disease, with long-term current use of insulin (HCC)    Hyperlipemia    Shortness of breath    Normocytic hypochromic anemia    Chronic kidney disease, stage III (moderate) ([facility])    Vitamin D deficiency disease    Essential hypertension    Acute on chronic systolic CHF (congestive heart failure) ([facility] [address]    GIB (gastrointestinal bleeding)    Blood in stool    Ischemic cardiomyopathy    Hypoglycemia    Physical deconditioning       Allergies:  Lisinopril     Temp max: 98.1    Recent Labs     05/16/19  0555 05/17/19  0614   BUN 83* 88*       Recent Labs     05/16/19  0555 05/17/19  0614   CREATININE 2.28* 2.13*       Recent Labs     05/15/19  0540   WBC 9.4       No intake or output data in the 24 hours ending 05/17/19 2345    Culture Date      Source                       Results      Ht Readings from Last 1 Encounters:   05/17/19 5' 8.11\" (1.73 m)        Wt Readings from Last 1 Encounters:   05/17/19 216 lb 4.3 oz (98.1 kg)         Body mass index is 32.78 kg/m². Estimated Creatinine Clearance: 34 mL/min (A) (based on SCr of 2.13 mg/dL (H)). Assessment/Plan:  Will initiate levofloxacin  750 mg IV every 48 hours. Pharmacy will follow patient and adjust dose base on patient's culture results, renal function, and clinical response. Thank you for the consult. Desire Franklin Pharm. D.    5/17/2019  11:49 PM

## 2019-05-19 NOTE — PROGRESS NOTES
Hospitalist Progress Note  5/19/2019 9:19 AM  Subjective:   Admit Date: 5/17/2019  PCP: Kayla Cazares MD    Interval History:   Diony Michael states he feels a lot better, still has some shortness of breath with exertion, but it improved. Has occasional cough, no wheezing. He states he feels stronger. Denies headache, chest pain, nausea/vomiting, abdominal pain. Appetite is good. Swelling in the  arms significantly improved. Diet: DIET CARB CONTROL; Low Sodium (2 GM)  Medications:   Scheduled Meds:   sodium chloride flush  10 mL Intravenous 2 times per day    insulin lispro  0-6 Units Subcutaneous TID WC    insulin lispro  0-3 Units Subcutaneous Nightly    aspirin EC  81 mg Oral Daily    bumetanide  2 mg Oral BID    carvedilol  12.5 mg Oral BID    DULoxetine  20 mg Oral Daily    famotidine  20 mg Oral Daily    ferrous sulfate  325 mg Oral BID    fluticasone  1 spray Nasal Daily    guaiFENesin  600 mg Oral BID    heparin (porcine)  5,000 Units Subcutaneous BID    hydrALAZINE  100 mg Oral 3 times per day    ipratropium-albuterol  1 ampule Inhalation Q4H WA    isosorbide mononitrate  60 mg Oral BID    NIFEdipine  60 mg Oral BID    simvastatin  40 mg Oral QPM    sodium bicarbonate  650 mg Oral BID    spironolactone  25 mg Oral Daily    vitamin C  500 mg Oral Daily     Continuous Infusions:   dextrose       PRN Medications: acetaminophen, magnesium hydroxide, ondansetron, sodium chloride flush, dextrose, dextrose, glucagon (rDNA), glucose, LORazepam, traMADol, zolpidem    Objective:   Vitals: BP (!) 123/47   Pulse 71   Temp 98 °F (36.7 °C) (Oral)   Resp 18   Ht 5' 8.11\" (1.73 m)   Wt 205 lb 8 oz (93.2 kg)   SpO2 93%   BMI 31.15 kg/m²   BMI: Body mass index is 31.15 kg/m². CBC: No results for input(s): WBC, HGB, PLT in the last 72 hours.   BMP:    Recent Labs     05/17/19  0614 05/18/19  0615 05/19/19  0559    135 139   K 4.3 4.1 3.9   CL 95* 96* 98   CO2 23 24 25   BUN 88* 79* 71* CREATININE 2.13* 2.00* 1.98*   GLUCOSE 254* 240* 283*     Physical Exam:      General Appearance: alert and oriented to person, place and time, in no acute distress  Cardiovascular: normal rate, regular rhythm, normal S1 and S2, no murmurs, rubs, clicks, or gallops, distal pulses intact  Pulmonary/Chest: clear to auscultation bilaterally, diminished breath sounds at the bases, no wheezes, rales or rhonchi,  Abdomen: soft, non-tender, non-distended, normal bowel sounds  Extremities: 2+ edema in both legs  Skin: warm and dry, no rash or erythema        Assessment and Plan:       1.  Acute on chronic systolic CHF, EF 36% per 2D echo -  Clinically improving, on  Bumex 2 mg bid by mouth,  Chery catheter discontinued.  Continue monitoring I&Os, weight. 2.Bilateral moderate size pleural effusions, fluid overload  - continue on Bumex  3.  Questionable pneumonia versus bronchitis with hypoxia - completed IV Levaquin and Vanco,  continue  on nebulizers.   Continue on oxygen supplement as needed  4.  Acute on chronic kidney disease stage 4 -   Renal function improving  5.  Chronic anemia which is iron deficiency and CKD - on iron replacement. 6.  Diabetes mellitus type 2 - patient had episodes of hypoglycemia, insulin 70/30 discontinued, managing with  sliding scale short-acting insulin  7.  Deconditioning, generalized weakness - continue with PT and OT   8.  Hypermagnesemia - resolving   9.  Status post bypass surgery in 2008 at [facility] with KAPOOR to the LAD, a vein  graft to the OM, and a vein graft to right coronary artery   10. .  Chronic hip pain -on prn tramadol            Electronically signed by Yasmine Estrella MD on 5/19/2019 at 9:19 AM    Rounding Hospitalist

## 2019-05-19 NOTE — PROGRESS NOTES
[facility]  Occupational Therapy  Daily Note  Date: 2019  Patient Name: Merna Ambrocio        MRN: [id number]    : 1940  (70 y.o.)    Subjective: Pt in bed upon arrival & states he does not feel well this. Assessment  Assessment: Co tx w/ PTA this date d/t fatigue. Pt supine upon arrival & states he does not feel well. Initially declines therapy but is encouraged to complete EOB exercises w/ PT/OT & pt agreeable. Comes EOB w/ SBA. Completes limited UB exercises d/t SOB & fatigue this date. O2 sats monitored during session, pt drops to 89% at the lowest during physical activity. Educated on PLB & O2 levels return to 90s after short recovery period. Pt declines further activity. Completes toileting tasks via urinal standing at bedside w/ PTA. Returns to supine w/ SBA & remains there w/ call light in reach & bed alarms in place.    Prognosis: Fair  Discharge Recommendations: Home with assist PRN         Goals  Short Term Goals  Time Frame for Short term goals: 5 days (19)  Short term goal 1: Pt to tolerate SAHRA UB activity x 30 min w/ 3 or fewer rest breaks while maintaining O2 sats @ 90 or above  Short term goal 2: Pt to complete toileting tasks SBA  Short term goal 3: Pt to complete item retrival activity while demonstrating good safey awareness w/ FWW/Cane at [facility] 346 term goal 4: Pt to be educated on AE & EC/WS & PLB strategies/techniques to minimize SOB & fatigue w/ daily activities  Short term goal 5: Pt to complete UB/LB bathing & dressing tasks CGA/Min A w/ use of AE as needed     Time In: 936  Time Out: 1005  Timed Coded Minutes:14  Total Treatment Time:29    Vanessa [facility] OTR/L Date: 2019

## 2019-05-19 NOTE — PROGRESS NOTES
Pt spilled his urinal on the floor and down the side of the bed. Complete linen change. Pt up to bathroom sink to wash up and dressed for the day. Thigh high alejandro hoses applied. New telemetry electrodes applied.  Back to sitting on side of bed, personal alarm on, urinal in reach

## 2019-05-19 NOTE — PROGRESS NOTES
Phone: Kinjal  Date: 2019  Fax: [phone]      Physical Therapy    Daily Note    Patient Name: Hellen Fu      : 1940  (26 y.o.)  MRN: [id number]     [x] Patient requires additional Physical Therapy    [] Anticipate Physical Therapy Discharge Soon     Assessment        Supine to Sit: Independent          Sit to Stand: Independent  Stand to sit: Independent                Ambulation 1  Surface: level tile  Device: Single point cane  Other Apparatus: O2, [email or web address] [email or web address])  Assistance: Contact guard assistance  Quality of Gait: steady but does fatigue signficantly with increased SOB . Patient uses cane for support  Distance: 70 ft x1  Comments: fatigue and SOB noted during gait             Assessment: Co-treat with OT this morning. Patient supine in bed upon entry. Patient states he is not feeling well this morning and does not plan to participate in therapy this morning. With encouragement patient agrees to session in his room. Supine to sit is supervision. Patient completes seated LE exercises at edge of bed as outlined. Patient on room air throughout session. O2 sats measured throughout session with lowest reading at 89%, but with rest and pursed lip breathing levels rebound into low 90s. Patient stands with supervision to use urinal before reaturning to supine in bed. Bed alarm in place instead of personal alarm. Call light in reach with patient still on room air.   Discharge Recommendations: Home with assist PRN  Safety Devices  Type of devices: Call light within reach, Chair alarm in place, Gait belt, Left in bed, Nurse notified          Time In: 2525  Time Out: 1005  Timed Coded Minutes: 15 (co treat with OT)  Total Treatment Time: 29    Exercises:  See Flowsheets    Plan  Cont Per Plan Of Care    Goals  Short Term Goals  Time Frame for Short term goals: 5 days - expires 19  Short term goal 1: LE strength 4+-5/5 to complete reciprical stair ambulation to

## 2019-05-20 NOTE — PROGRESS NOTES
In room to check on pt. Offered to take pt for a walk around the [place] and pt states \" well they are going to be up here at four so I don't want to go. \" Pt educated on benefits of walking and reasons to walk. Patient denies needs at this time, call light within reach, will continue to monitor.

## 2019-05-20 NOTE — PROGRESS NOTES
[facility]  Occupational Therapy  Daily Note  Date: 2019  Patient Name: Arjun Aaron        MRN: [id number]    : 1940  (70 y.o.)    Subjective: Pt seated in chair upon arrival.    Objective  Sit to stand: Supervision  Stand to sit: Supervision        Assessment  Assessment: Decreased tolerance to session this afternoon; c/o increased SOB and fatigue. Pt demos functional mobility with FWW and supervision to and from therapy room. Continues to do well with use of FWW. Engages in B UE exercise using 3# dowel to increase UB strength and activity tolerance. Requires rest breaks between sets d/t fatigue and SOB. Returns to EOB at conclusion of session with call light in reach.   Prognosis: Fair  Discharge Recommendations: Home with assist PRN       Exercises:  See Flowsheets    Goals  Short Term Goals  Time Frame for Short term goals: 5 days (19)  Short term goal 1: Pt to tolerate SAHRA UB activity x 30 min w/ 3 or fewer rest breaks while maintaining O2 sats @ 90 or above  Short term goal 2: Pt to complete toileting tasks SBA  Short term goal 3: Pt to complete item retrival activity while demonstrating good safey awareness w/ FWW/Cane at [facility] 346 term goal 4: Pt to be educated on AE & EC/WS & PLB strategies/techniques to minimize SOB & fatigue w/ daily activities  Short term goal 5: Pt to complete UB/LB bathing & dressing tasks CGA/Min A w/ use of AE as needed             Time In: 1310  Time Out: 1340  Timed Coded Minutes: 30  Total Treatment Time: 30    JUAN Case    Date: 2019

## 2019-05-20 NOTE — PROGRESS NOTES
Phone: Kinjal  Date: 2019  Fax: [phone]      Physical Therapy    Daily Note    Patient Name: Gnozalo Daley      : 1940  (85 y.o.)  MRN: [id number]     [] Patient requires additional Physical Therapy    [x] Anticipate Physical Therapy Discharge Soon     Assessment        Supine to Sit: Independent          Sit to Stand: Independent  Stand to sit: Independent                Ambulation 1  Surface: level tile  Device: Rolling Walker  Other Apparatus: O2  Assistance: Supervision, Stand by assistance  Quality of Gait: Patient exhibited occasional LOB with cane ambulation; introduced wh walker which patient reports good stability. Ambulates to and from therapy room using wh walker without LOB using 02@ 1L. Distance: 70 ft x  2  Comments: Patient utilizing 02 @ 1L. Completed stair ambulation x 1 lap along with room ambulation without 02 ; 02 sat decreased to 86%. Patient demonstrates SOB and limited tolerance to activity and requires frequent rest breaks however based on discussion this was happening prior to admission and feel patient is close to baseline status although he did not use /have 02 at home previously. Assessment: Patient experienced  2 LOB's working with OT. Transitioned to wh walker instead of cane with good tolerance and balance. Patient  requires frequent rest breaks due to SOB/fatigue. Attempted to complete short distance ambulation/stairs without 02 however sat level decreased to 86-87%; 02 re-applied. Upon discussion, patient with limited endurance level prior to admission and feel patient is close to baseline status however he did not utilize 02 prior to admission. Will plan to continue to limit use of 02 and monitor saturation levels with activity to determine need for 02 at home.   Discharge Recommendations: Home with assist PRN  Safety Devices  Type of devices: Call light within reach, Chair alarm in place, Gait belt, Left in bed, Nurse notified          Time In: 1140  Time Out: 3469  Timed Coded Minutes: 25  Total Treatment Time: 25    Exercises:  See Flowsheets    Plan  Cont Per Plan Of Care    Goals  Short Term Goals  Time Frame for Short term goals:  4 days - expires 5/23/19  Short term goal 1: LE strength 4+-5/5 to complete reciprical stair ambulation to enter/exit home  Short term goal 2: Good endurance and LE strength to complete fucntional ambulation of 100 ft x 2 without fatigue and decreased balance increasing falls risk  Short term goal 3: Monitor 02 sats with progressive reduction of 02 and maintain > 90% to return home          Shaheen [address] Number: [id number]    Date: 5/20/2019

## 2019-05-20 NOTE — PROGRESS NOTES
Pt has 6 second run of V-tach. In to check on pt, denies c/o S.O.B. Chest pain. Call placed to Dr. Beena Chi. Waiting for call back.

## 2019-05-20 NOTE — PROGRESS NOTES
[facility]  [facility] Therapy    Date: 2019  Patient Name: Maureen Ojeda        : 1940       [] Pt Refusal           [x] Pt Unavailable due to: cardiac episode that nurse was waiting for Dr. Derek Wellington to look at before attempting therapy. Nurse plans to walk after patient finishes dinner.         Raudel Huang, PTA Date: 2019

## 2019-05-20 NOTE — PROGRESS NOTES
Pt puts on call light and states \"i've got a bloody nose. \" Tissues provided and pt holds pressure on nose until bleeding stops, approximately 3 minutes. Patient denies needs at this time, call light within reach, will continue to monitor.

## 2019-05-20 NOTE — PLAN OF CARE
[facility]  Swingbed Physical Therapy  Plan of Care  Date: 2019  Patient Name: Annabel Hernández        : 1940  (17 y.o.)     Admission Date: 2019           PT Orders Received and Evaluation Complete  Identified Problem Areas: Body structures, Functions, Activity limitations: Decreased strength, Decreased endurance, Decreased ADL status  Assessment: Patient has been participating in skilled therapy to address LE strengthening and endurance to complete functional ambulation and reduce falls risk associated with fatigue. He normally does not utilize 02 at home and ambulates with cane. Due to several LOB episodes, a wheeled walker was attempted which increases stability and safety with gait. He continues to require 02 @ 1L due to drop in saturation levels on room air both at rest and with activity. He would benefit from additional PT to increase activity level while monitoring 02 sats to determine need for home 02 as well as providing most effective and safe assistive device.       Justification for Skilled Services:  [] Reduce Falls   [x] Improve Ambulation  []  Complete Daily Tasks Safely   [] Improve Balance   [x] Improve LE strength  [x]  Return to Prior Level of Function  [x] Improve Functional Mobility   []  Family/Caregiver Education  [x] Patient Education: []Assistive Devices []Home Exercise Program and Progression    Plan  Times per week: Daily  Times per day: (1-2x/day)  [] Modalities:  [x] Therapeutic Exercise   [x] Gait Training  [] Therapeutic Activity    [] Home Safety Evaluation         [] Massage                        [] Neuromuscular Re-education [] Back Education             [x] Patient Education [] Home Exercise Program  Discharge Recommendations: Home with assist PRN    Rehab Potential:  [x]  Good [] Fair   []  Poor    Goals  Short term goals  Time Frame for Short term goals:  4 days - expires 19  Short term goal 1: LE strength 4+-5/5 to complete reciprical stair ambulation to enter/exit home  Short term goal 2: Good endurance and LE strength to complete fucntional ambulation of 100 ft x 2 without fatigue and decreased balance increasing falls risk  Short term goal 3: Monitor 02 sats with progressive reduction of 02 and maintain > 90% to return home         VINOD BRIGGS, PT   Date: 5/20/2019

## 2019-05-20 NOTE — PROGRESS NOTES
[facility]  Physical Therapy    Date: 2019  Patient Name: Dee Jones        : 1940       [x] Pt Refusal    Pt states he is too fatigued for therapy right now. Refuses any P.T. At this time.      Mil Madrid, PTA Date: 2019 @ [id number]

## 2019-05-20 NOTE — PROGRESS NOTES
Hospitalist Progress Note  5/20/2019 5:29 AM  Subjective:   Admit Date: 5/17/2019  PCP: Daniel Baird MD    Interval History: Carissa Roberts has no complaints this am.  He feels he is doing a lot better. Carissa Roberts feels his breathing is getting close to baseline. Occasional cough, no production. No chest pain. Appetite is good with no nausea. Bowels are moving and he denies any trouble urinating. He feels he is progressing with therapy. Diet: DIET CARB CONTROL; Low Sodium (2 GM)  Medications:   Scheduled Meds:   insulin lispro protamine & lispro  10 Units Subcutaneous BID WC    sodium chloride flush  10 mL Intravenous 2 times per day    insulin lispro  0-6 Units Subcutaneous TID WC    insulin lispro  0-3 Units Subcutaneous Nightly    aspirin EC  81 mg Oral Daily    bumetanide  2 mg Oral BID    carvedilol  12.5 mg Oral BID    DULoxetine  20 mg Oral Daily    famotidine  20 mg Oral Daily    ferrous sulfate  325 mg Oral BID    fluticasone  1 spray Nasal Daily    guaiFENesin  600 mg Oral BID    heparin (porcine)  5,000 Units Subcutaneous BID    hydrALAZINE  100 mg Oral 3 times per day    ipratropium-albuterol  1 ampule Inhalation Q4H WA    isosorbide mononitrate  60 mg Oral BID    NIFEdipine  60 mg Oral BID    simvastatin  40 mg Oral QPM    sodium bicarbonate  650 mg Oral BID    spironolactone  25 mg Oral Daily    vitamin C  500 mg Oral Daily     Continuous Infusions:   dextrose       CBC:   Recent Labs     05/20/19  0515   WBC 7.5   HGB 8.5*        BMP:    Recent Labs     05/17/19  0614 05/18/19  0615 05/19/19  0559    135 139   K 4.3 4.1 3.9   CL 95* 96* 98   CO2 23 24 25   BUN 88* 79* 71*   CREATININE 2.13* 2.00* 1.98*   GLUCOSE 254* 240* 283*     Hepatic: No results for input(s): AST, ALT, ALB, BILITOT, ALKPHOS in the last 72 hours. Troponin: No results for input(s): TROPONINI in the last 72 hours. BNP: No results for input(s): BNP in the last 72 hours.   Lipids: No results for input(s): CHOL, HDL in the last 72 hours. Invalid input(s): LDLCALCU  INR: No results for input(s): INR in the last 72 hours. Objective:   Vitals: BP (!) 137/56   Pulse 85   Temp 98.1 °F (36.7 °C) (Oral)   Resp 20   Ht 5' 8.11\" (1.73 m)   Wt 207 lb 12.8 oz (94.3 kg)   SpO2 94%   BMI 31.49 kg/m²   General appearance: alert and cooperative with exam  HEENT: Head: Normocephalic, no lesions, without obvious abnormality. Eye: Normal external eye, conjunctiva, lids cornea, REAL. Nose: Normal external nose, mucus membranes and septum. Nose: Nasal oxygen on. Neck: no adenopathy and supple, symmetrical, trachea midline  Lungs: clear to auscultation bilaterally  Heart: regular rate and rhythm, S1, S2 normal and II/VI systolic murmur. Abdomen: soft, non-tender; bowel sounds normal; no masses,  no organomegaly and over wt. Extremities: 2 + edema of the lower legs. No calf tenderness. Neurologic: Mental status: Alert, oriented, thought content appropriate    Assessment and Plan:   1. Generalized weakness - slowly improving. Patient plans on returning home at discharge with his wife. Daughter lives across the road. 2. Acute on chronic systolic CHF with an EF of 35%. Stable on Bumex 2 mg BID. 3. Bilateral pleural effusions - improving on Bumex. 4. Pneumonia vs Bronchitis - improved with treatment of Levaquin / Vancomycin. 5. Acute on chronic renal failure - appears to be back to baseline. 6. DM II - placed back on 75/25. Will need to adjust.  7. Chronic hip pain - on Tramadol prn.  8. Chronic anemia secondary to CKD and iron deficiency - on iron replacement. Plan:  1. Continue PT / OT daily. 2.  Wean oxygen as tolerated. May require home oxygen.   3.  Discharge when he has met his goals with therapy.           Patient Active Problem List:     CAD (coronary artery disease)     Type 2 diabetes mellitus with stage 2 chronic kidney disease, with long-term current use of insulin ([facility] [address] Hyperlipemia     Shortness of breath     Normocytic hypochromic anemia     Chronic kidney disease, stage III (moderate) (HCC)     Vitamin D deficiency disease     Essential hypertension     Acute on chronic systolic CHF (congestive heart failure) (HCC)     GIB (gastrointestinal bleeding)     Blood in stool     Ischemic cardiomyopathy     Hypoglycemia     Physical deconditioning      Timmy Dudley MD  Rounding Hospitalist

## 2019-05-20 NOTE — PROGRESS NOTES
Pt. Ambulated 1/2 lap of 2 East floor. Pt tolerated fairly well. Pt return to bed. Patient denies needs at this time, call light within reach, will continue to monitor.

## 2019-05-20 NOTE — PLAN OF CARE
Problem: Falls - Risk of:  Goal: Will remain free from falls  Description  Will remain free from falls  5/20/2019 1529 by Annette Palacio RN  Outcome: Met This Shift  5/20/2019 0939 by Anshu Victor RN  Outcome: Met This Shift

## 2019-05-20 NOTE — PROGRESS NOTES
Nutrition Assessment    Type and Reason for Visit: Initial(SBU)    Nutrition Recommendations:  Encourage consistent meal intakes    Nutrition Assessment: Altered nutrition related lab values, r/t endocrine dysfuction, AEB glucose readings in 200s. Previously lower A1C, suggesting frequent hypoglycemia pta. Decreases in mixed insulin has lesened prior hypoglycemia risks. Renal indices improving, as have oral intakes. Has declined education on prior evals, and his spouse is not available upon visit this AM.      Malnutrition Assessment:  · Malnutrition Status: At risk for malnutrition  · Context: Acute illness or injury  · Findings of the 6 clinical characteristics of malnutrition (Minimum of 2 out of 6 clinical characteristics is required to make the diagnosis of moderate or severe Protein Calorie Malnutrition based on AND/ASPEN Guidelines):  1. Energy Intake-Greater than 75% of estimated energy requirement,      2. Weight Loss-(historical fluctuations noted),    3. Fat Loss-No significant subcutaneous fat loss,    4. Muscle Loss-No significant muscle mass loss,    5. Fluid Accumulation-Mild fluid accumulation, Generalized  6.   Strength-Not measured    Nutrition Risk Level: Low, Moderate    Nutrient Needs:  · Estimated Daily Total Kcal: 4597-2343(68-15)  · Estimated Daily Protein (g): 84-91(1.2-1.3)  · Estimated Daily Total Fluid (ml/day): 2000+    Nutrition Diagnosis:   · Problem: Altered nutrition-related lab values  · Etiology: related to Endocrine dysfunction     Signs and symptoms:  as evidenced by Lab values(glucose values)    Objective Information:  · Nutrition-Focused Physical Findings: obese at current weight  · Wound Type: None  · Current Nutrition Therapies:  · Oral Diet Orders: Carb Control 4 Carbs/Meal, 2gm Sodium   · Oral Diet intake: %  · Oral Nutrition Supplement (ONS) Orders: None  · Anthropometric Measures:  · Ht: 5' 8\" (172.7 cm)   · Current Body Wt: 207 lb 12.8 oz (94.3 kg)  · Admission Body Wt: 207 lb (93.9 kg)(acute hospitalization)  · Usual Body Wt: (184-199. 2#)  · % Weight Change:  ,  back to admission weights, remaining above historical values  · Ideal Body Wt: 154 lb (69.9 kg), % Ideal Body 135%  · BMI Classification: BMI 30.0 - 34.9 Obese Class I    Lab Results   Component Value Date     05/20/2019    K 3.7 05/20/2019     05/20/2019    CO2 24 05/20/2019    BUN 61 (H) 05/20/2019    CREATININE 1.78 (H) 05/20/2019    GLUCOSE 336 (H) 05/20/2019    CALCIUM 8.4 (L) 05/20/2019    PROT 7.0 05/02/2019    LABALBU 4.5 05/02/2019    BILITOT 0.30 05/02/2019    ALKPHOS 60 05/02/2019    AST 13 05/02/2019    ALT 8 05/02/2019    LABGLOM 37 (L) 05/20/2019    GFRAA 45 (L) 05/20/2019    GLOB NOT REPORTED 07/18/2013     Lab Results   Component Value Date    LABA1C 5.3 05/08/2019     Lab Results   Component Value Date     05/08/2019     Nutrition Interventions:   Continue current diet  Continued Inpatient Monitoring, Education declined, Coordination of Care    Nutrition Evaluation:   · Evaluation: Goals set   · Goals: PO >75% meals with low sodium and consistent carbohydrates    · Monitoring: Meal Intake, Patient/Family Education, Weight, Pertinent Labs, I&O    Electronically signed by Eric Mujica RD, LD on 5/20/19 at 7:50 AM    Contact Number: [phone]

## 2019-05-20 NOTE — PROGRESS NOTES
Pt sitting on edge of bed with call light in reach. Pt denies any needs at this time. Family at bedside.

## 2019-05-20 NOTE — PROGRESS NOTES
[facility]  Occupational Therapy  Daily Note  Date: 2019  Patient Name: Anshu Couch        MRN: [id number]    : 1940  (70 y.o.)    Subjective: Pt in bed upon arrival.    Objective  Balance  Standing Balance: Supervision       Sit to stand: Supervision(VC for hand placement)  Stand to sit: Supervision(VC for hand placement)           Assessment  Assessment: Pt initially refuses tx this morning. Therapist returned about 45 minutes later and pt agreeable to participate with encouragement from daughter. Therapist educated daughter on pt progress and discussed POC/goals. Pt's daughter states she would like him to be able to go to/from BR independently and complete simple meal prep. Discussed discontinuing use of urinal during the day and encouraging pt to walk to/from BR to void. Trialed FWW this date as pt typically demos some LOB with cane and utilizes hand rail in hallway. Pt and daughter very happy with pt's performance using FWW. Pt ambulates entire distance to therapy room with FWW with SBA, no LOB noted. SW notified that daughter and pt would like a FWW. Facilitated item retrieval task in kitchen this date. Pt completes with VC for walker safety but no LOB noted. Educated pt and daughter on safety with O2 tubing as well. Pt remains in therapy room to work with physical therapy.   Prognosis: Fair  Discharge Recommendations: Home with assist PRN              Exercises:  See Flowsheets    Goals  Short Term Goals  Time Frame for Short term goals: 5 days (19)  Short term goal 1: Pt to tolerate SAHRA UB activity x 30 min w/ 3 or fewer rest breaks while maintaining O2 sats @ 90 or above  Short term goal 2: Pt to complete toileting tasks SBA  Short term goal 3: Pt to complete item retrival activity while demonstrating good safey awareness w/ FWW/Cane at [facility] 346 term goal 4: Pt to be educated on AE & EC/WS & PLB strategies/techniques to minimize SOB & fatigue w/ daily activities  Short term goal

## 2019-05-21 NOTE — PROGRESS NOTES
Phone: Kinjal  Date: 2019  Fax: [phone]      Physical Therapy    Daily Note    Patient Name: Dee Jones      : 1940  (89 y.o.)  MRN: [id number]     [x] Patient requires additional Physical Therapy    [] Anticipate Physical Therapy Discharge Soon     Assessment                Sit to Stand: Independent  Stand to sit: Independent                Ambulation 1  Surface: level tile  Device: Rolling Walker  Other Apparatus: [email or web address])  Assistance: Supervision, Stand by assistance  Quality of Gait: steady with wheeled walker  Distance: 70 ft x  2  Comments: ---             Assessment: Patient seated up in chair upon arrival to room visiting with company. He agrees to work with PTA but wishes to stay in his room. He does agree to ambulate in the hallway  Sit to stand from chair is independent. Ambulates with wheeled walker and supervision to end of hallway and returns to room. Requires short standing rest break before returning back to room. Sits on edge of bed and while sitting there he is able to complete seated exercises and standing exercises with good tolerance. Fatigue noted and patient states he did not sleep well last night. Remains seated at edge of bed with call light in reach and nurse notified.   Discharge Recommendations: Home with assist PRN  Safety Devices  Type of devices: Call light within reach, Gait belt, Nurse notified, Left in bed(seated at edge of bed)          Time In: 857  Time Out: 927  Timed Coded Minutes: 30  Total Treatment Time: 30    Exercises:  See Flowsheets    Plan  Cont Per Plan Of Care    Goals  Short Term Goals  Time Frame for Short term goals:  4 days - expires 19  Short term goal 1: LE strength 4+-5/5 to complete reciprical stair ambulation to enter/exit home  Short term goal 2: Good endurance and LE strength to complete fucntional ambulation of 100 ft x 2 without fatigue and decreased balance increasing falls risk  Short term goal 3:  Monitor 02 sats with progressive reduction of 02 and maintain > 90% to return home          [address] [facility] License Number: [id number]    Date: 5/21/2019

## 2019-05-21 NOTE — PROGRESS NOTES
[facility]  Occupational Therapy  Daily Note  Date: 2019  Patient Name: Pankaj Frances        MRN: [id number]    : 1940  (70 y.o.)    Subjective: Pt seated EOB upon arrival          Assessment  Assessment:Seated EOB upon arrival & agreeable to work with OT despite being tired. Tolerates SAHRA UB exercises 1x15 w/ 3# wt decent this date but does require multiple rest breaks d/t increased SOB & fatigue. Completes FM dexterity task at EOB as pt declines standing this date. Task done to continue to encourage shldr flexion & address in hand manipulation & dexterity for self care tasks. Declines transfer to bedside chair at this time. Remains EOB at conclusion of session as pt states he is tired and wishes to rest. Call light in reach and RN notified.    Discharge Recommendations: Home with assist PRN       Goals  Short Term Goals  Time Frame for Short term goals: 5 days (19)  Short term goal 1: Pt to tolerate SAHRA UB activity x 30 min w/ 3 or fewer rest breaks while maintaining O2 sats @ 90 or above  Short term goal 2: Pt to complete toileting tasks SBA  Short term goal 3: Pt to complete item retrival activity while demonstrating good safey awareness w/ FWW/Cane at [facility] 346 term goal 4: Pt to be educated on AE & EC/WS & PLB strategies/techniques to minimize SOB & fatigue w/ daily activities  Short term goal 5: Pt to complete UB/LB bathing & dressing tasks CGA/Min A w/ use of AE as needed     Time In: 952  Time Out: 1024  Timed Coded Minutes: 32  Total Treatment Time: 32    Vanessa [facility], OTR/L Date: 2019

## 2019-05-21 NOTE — PROGRESS NOTES
[facility]  Occupational Therapy  Daily Note  Date: 2019  Patient Name: Alannah Li        MRN: [id number]    : 1940  (70 y.o.)    Subjective: Side lying in bed upon arrival         Assessment  Assessment:Pt side lying in bed upon arrival. Pt agreeable to OT this attempt. Comes to EOB from side lying Mod I. Completes intrinsic  strengthening activity 1x15 each hand to address intrinsic strengthening for self care & IADLs. Tolerates well. Completes 3 SAHRA UB exercises 1x15 each w/ 3# wt, requires rest breaks between each exercise d/t SOB & fatigue. About 10-15 min into session pt states \"I've bout had it for the day, no more\". OTR encourages pt to keep working & pt agreeable to pinch strengthening & ROM activity (graded clothespins) to address pinch strength bilaterally for self care & IADL tasks as well as increase endurance. Pt declines completing any standing activities this date. Declines ambulation to bedside chair. Wishes to remain EOB for a while before returning to bed. Pt left EOB w/ call light in reach and RN notified. Poor overall endurance & tolerance for physical activity this date.    Prognosis: Fair  Discharge Recommendations: Home with assist PRN       Goals  Short Term Goals  Time Frame for Short term goals: 5 days (19)  Short term goal 1: Pt to tolerate SAHRA UB activity x 30 min w/ 3 or fewer rest breaks while maintaining O2 sats @ 90 or above  Short term goal 2: Pt to complete toileting tasks SBA  Short term goal 3: Pt to complete item retrival activity while demonstrating good safey awareness w/ FWW/Cane at [facility] 346 term goal 4: Pt to be educated on AE & EC/WS & PLB strategies/techniques to minimize SOB & fatigue w/ daily activities  Short term goal 5: Pt to complete UB/LB bathing & dressing tasks CGA/Min A w/ use of AE as needed     Time In: 1406  Time Out: 1428  Timed Coded Minutes: 22  Total Treatment Time: 22    Vanessa RODRIGUEZ OTR/L Date: 2019

## 2019-05-21 NOTE — PROGRESS NOTES
[facility]  [facility]upational [facility]    Date: 2019  Patient Name: Cele Phan        : 1940       [x] Pt Refusal Attempted OT tx at 1335. Pt in bed sleeping upon arrival. Pt awakens & declines working w/ therapy stating \"I just want to rest for awhile\". RN reports pt did not sleep well last night.  Pt agreeable for OTR to check back at 2pm.            [] Pt Unavailable due to:          JENNIFER Lagos, OTR/L Date: 2019

## 2019-05-21 NOTE — PROGRESS NOTES
Pt sitting up at side of bed requesting \" sleeping pill. \" Medicated, see MAR. No s/s of distress. Denies further needs. Bed alarm on. Call light in reach. Will continue to monitor.

## 2019-05-21 NOTE — PROGRESS NOTES
Sitting up at side of bed eating snack. No s/s of distress. Denies needs. Bed alarm on. Call light in reach. Will continue to monitor.

## 2019-05-21 NOTE — PROGRESS NOTES
In bed with eyes closed. No s/s of distress. Bed alarm on. Call light in reach. Will continue to monitor.

## 2019-05-21 NOTE — PROGRESS NOTES
Phone: Kinjal  Date: 2019  Fax: [phone]      Physical Therapy    Daily Note    Patient Name: Dee Jones      : 1940  (89 y.o.)  MRN: [id number]     [] Patient requires additional Physical Therapy    [x] Anticipate Physical Therapy Discharge Soon     Assessment        Supine to Sit: Independent          Sit to Stand: Independent  Stand to sit: Independent                Ambulation 1  Surface: level tile  Device: Rolling Walker  Other Apparatus: [email or web address])  Assistance: Modified Independent  Quality of Gait: steady with wheeled walker  Distance: 70 ft x  2  Comments: ---             Assessment: Patient up in chair upon arrival.   Ambulated 70-75 ft with wh walker without 02 to monitor sat levels with activity. Patient decreased to 89-90% following gait and mild SOB noted. 02 sats improved to 26% with re-application of oxygen. Following short rest period, ambulated again with wh walker up to 100 ft.  02 decreased to 90% which returned to 53% with re-application of oxygen. Respiratory therapy notified of status; they will discuss use of oxygen at home with patient/family however formal testing for home 02 qualifcations will be conducted 24 hours prior to discharge.  obtaining script for family member to  wh walker. Patient safe and independent with basic mobility and only exhibits decreased endurance/SOB with activity. To be discharged home 19; Will discharge from PT 19. No home care PT recommended.   Discharge Recommendations: Home with assist PRN  Safety Devices  Type of devices: Call light within reach, Gait belt, Nurse notified, Left in bed(seated at edge of bed)          Time In: 1140  Time Out: 1155  Timed Coded Minutes: 15  Total Treatment Time: 15    Exercises:  See Flowsheets    Plan  Cont Per Plan Of Care    Goals  Short Term Goals  Time Frame for Short term goals:  4 days - expires 19  Short term goal 1: LE strength

## 2019-05-21 NOTE — PROGRESS NOTES
Up to BR several times today to void to encourage ambulation instead of using urinal./  specipan in toilet to measure urine-refuses to use urinal.  Has missed specipan each time, urinating on floor a large amount. Unable to obtain accurate output.

## 2019-05-22 NOTE — PROGRESS NOTES
Pt A&O, BS obtained  Bs 55. 4 oz of OJ given. Pt having nose bleed also. It appears pt may use fingers to remove \"scab\" from inside nose. .Electronically signed by Sae Ann RN on 5/22/2019 at 7:50 AM

## 2019-05-22 NOTE — PROGRESS NOTES
Pt sitting at side of bed. Requesting \" Sleeping pill. \" Medicated, see MAR. No s/s of distress. Denies further needs. Bed alarm on. Call light in reach. Will continue to monitor.

## 2019-05-22 NOTE — PROGRESS NOTES
Recheck on BS 64. Second 4 oz OJ given. Breakfast of toast , omlet and apple juice served.  Pt transfers self with walker to chair for breakfast.Electronically signed by Valarie Fragoso RN on 5/22/2019 at 8:08 AM

## 2019-05-22 NOTE — PROGRESS NOTES
[facility]  Occupational Therapy  Daily Note  Date: 2019  Patient Name: Claire Patel        MRN: [id number]    : 1940  (70 y.o.)        Pt seated at EOB upon arrival. Pt c/o of fatigue and not feeling well this afternoon. Agreeable to short session while EOB. Instructed pt in B UE exercise with 2# weight to continue to increase overall strength and activity tolerance to return home safely. Pt tolerates 1x15 of each exercise. States, \"that's it I'm done for now. \" Session ended at this time. Pt on room air throughout session. O2 does drop to 88-89% but quickly raises >90% with rest. Remains EOB to work with PTA.       Time In: 1335  Time Out: 1355  Timed Coded Minutes: 20  Total Treatment Time: 20    JUAN Case    Date: 2019

## 2019-05-22 NOTE — PROGRESS NOTES
Phone: Kinjal  Date: 2019  Fax: [phone]      Physical Therapy    Daily Note    Patient Name: Leni Christiansen      : 1940  (13 y.o.)  MRN: [id number]     [] Patient requires additional Physical Therapy    [x] Anticipate Physical Therapy Discharge Soon     Assessment        Supine to Sit: Independent          Sit to Stand: Independent  Stand to sit: Independent                Ambulation 1  Surface: level tile  Device: Rolling Walker  Other Apparatus: [email or web address])  Assistance: Modified Independent  Quality of Gait: steady with wheeled walker  Distance: 70 ft x  2  Comments: ---             Assessment: Patient seated at edge of bed after just finishing with MIGUEL. He agrees to participate with PTA but does not wish to leave his room. Completes seated exercises as stated above with fair tolerance. Patient then states that he has had enough for the day. Per PT plan, will discharge patient from PT at this time with possible discharge home tomorrow. Remains at edge of bed with call light in reach.   Discharge Recommendations: Home with assist PRN  Safety Devices  Type of devices: Call light within reach, Left in bed, Nurse notified(seated at edge of bed)          Time In: 1355  Time Out: 1405  Timed Coded Minutes: 10  Total Treatment Time: 10    Exercises:  See Flowsheets    Plan  Cont Per Plan Of Care    Goals  Short Term Goals  Time Frame for Short term goals:  4 days - expires 19  Short term goal 1: LE strength 4+-5/5 to complete reciprical stair ambulation to enter/exit home-NOT MET  Short term goal 2: Good endurance and LE strength to complete fucntional ambulation of 100 ft x 2 without fatigue and decreased balance increasing falls risk-NOT MET  Short term goal 3: Monitor 02 sats with progressive reduction of 02 and maintain > 90% to return home-NOT MET          Long Term Goals                      [facility] License Number: [id number]    Date: 2019

## 2019-05-22 NOTE — PROGRESS NOTES
[facility]P [facility]  Swing Bed Interdisciplinary Care Plan Conference Report   Recertification for Continued Skilled Care. Patients name:Tevin Powell  Date of Conference: 5/22/2019    The Following Information was discussed and agreed upon with the patient and/or Caregivers as listed below.     Names of Team Members and Caregivers present for meeting:  : Margaret Curtis  Nursing: DARI Seth  Therapy: , PT; , L/OTR  Dietician:   Activities: Lauro Neves  Pastoral Care:   Caregivers:    [] Spouse  [] Child/Children [] Significant Other   [] Other:     Nutrition:  Current Body Weight:   Wt Readings from Last 1 Encounters:   05/22/19 205 lb 9.6 oz (93.3 kg)     Weight Change: Stable  Current Diet order:DIET CARB CONTROL; Low Sodium (2 GM)  Intakes: %  Diet Education Provided: none  Goal: PO >75% meals and supplements    Spiritual:  Spiritism needs met: [x] Yes  [] No  [] N/A  Notified  or Michie of admission: [] Yes  [] No  [x] N/A  Patient added to Communion List: [] Yes  [] No  [x] N/A  Any other concerns or comments:   Goal: Participate 2-3 times per week    Occupational Therapy:  Current ADL Status:   Safety:   Recommendations for adaptive equipment:   [] Long handled sponge   [] Long Handled [address]  [] Extended Tub Bench  Short term goals  Time Frame for Short term goals: 5 days (5/22/19)  Short term goal 1: Pt to tolerate SAHRA UB activity x 30 min w/ 3 or fewer rest breaks while maintaining O2 sats @ 90 or above  Short term goal 2: Pt to complete toileting tasks SBA  Short term goal 3: Pt to complete item retrival activity while demonstrating good safey awareness w/ FWW/Cane at [facility] 346 term goal 4: Pt to be educated on AE & EC/WS & PLB strategies/techniques to minimize SOB & fatigue w/ daily activities  Short term goal 5: Pt to complete UB/LB bathing & dressing tasks CGA/Min A w/ use of AE as needed    Physical Therapy:  Transfers:   Transfers  Sit to Stand: Independent  Stand to sit: Independent  Mobility/Ambulation:   Ambulation 1  Surface: level tile  Device: Rolling Walker  Other Apparatus: [email or web address])  Assistance: Modified Independent  Quality of Gait: steady with wheeled walker  Distance: 70 ft x  2  Comments: ---  Equipment:   [x] Rolling Walker   [] Straight Cane  [] Standard Walker  Safety: Good  Short Term Goals:  Time Frame for Short term goals:  4 days - expires 5/23/19  Short term goal 1: LE strength 4+-5/5 to complete reciprical stair ambulation to enter/exit home  Short term goal 2: Good endurance and LE strength to complete fucntional ambulation of 100 ft x 2 without fatigue and decreased balance increasing falls risk  Short term goal 3: Monitor 02 sats with progressive reduction of 02 and maintain > 90% to return home          Speech Therapy:     Respiratory Therapy:     Nursing:  Skin/Wound/Incisions:  Skin Color/Condition  Skin Color: Ecchymosis  Skin Condition/Temp: Dry, Warm  Skin Integrity  Skin Integrity: (scabs on head arms  pt \"picks\" at scabs causes bleeding)  Location: Scalp, RFA  Preventative Dressing: No     Pain Control: ultram  New Medications since admission to Swing Bed:   Anticoagulants:    Goals:   Pt will remain free from falls  Pt will have control of acute pain    Activities: Activity as tolerated, TV, watches [employer]  Goal: Participate in 3 activities per week    :  Plan for Discharge: D/C home tomorrow May 23 with [facility] care. Follow Up/Services needed: Pt is set up with a walker at [facility], all info faxed. Continued skilled needs: PT/OT  Skilled Services are for the ongoing condition for which the individual received inpatient care in a hospital.    Physician signature certifies patient continued need for SNF inpatient care.

## 2019-05-22 NOTE — PROGRESS NOTES
Nutrition Assessment    Type and Reason for Visit: Reassess    Nutrition Recommendations:   1. Continue current diet. Nutrition Assessment: Improving glucose levels AEB glucose 115. PO is better, typically %. H/H are low. BUN and Cr remain elevated, but have improved. + BM 5/21. + 1 edema R/L upper and lower extremities, aslo improved and down from pitting edema. Malnutrition Assessment:  · Malnutrition Status: At risk for malnutrition  · Context: Acute illness or injury  · Findings of the 6 clinical characteristics of malnutrition (Minimum of 2 out of 6 clinical characteristics is required to make the diagnosis of moderate or severe Protein Calorie Malnutrition based on AND/ASPEN Guidelines):  1. Energy Intake-Greater than 75% of estimated energy requirement,      2. Weight Loss-(historical fluctuations noted),    3. Fat Loss-No significant subcutaneous fat loss,    4. Muscle Loss-No significant muscle mass loss,    5. Fluid Accumulation-Mild fluid accumulation, Generalized  6.  Strength-Not measured    Nutrition Risk Level: Low, Moderate    Nutrient Needs:  · Estimated Daily Total Kcal: 4629-9638(74-35)  · Estimated Daily Protein (g): 84-91(1.2-1.3)  · Estimated Daily Total Fluid (ml/day): 2000+    Nutrition Diagnosis:   · Problem: Altered nutrition-related lab values  · Etiology: related to Endocrine dysfunction     Signs and symptoms:  as evidenced by Lab values(glucose values)    Objective Information:  · Nutrition-Focused Physical Findings: obese at current weight  · Wound Type: None  · Current Nutrition Therapies:  · Oral Diet Orders: Carb Control 4 Carbs/Meal, 2gm Sodium   · Oral Diet intake: %  · Oral Nutrition Supplement (ONS) Orders: None  · Anthropometric Measures:  · Ht: 5' 8\" (172.7 cm)   · Current Body Wt: 205 lb 9.6 oz (93.3 kg)  · Admission Body Wt: 207 lb (93.9 kg)(acute hospitalization)  · Usual Body Wt: (184-199. 2#)  · % Weight Change:  ,  4.1% weight loss from initial SBU weight.  Fluid losses expected cause with improving renal function and decreased edema   · Ideal Body Wt: 154 lb (69.9 kg), % Ideal Body 134%  · BMI Classification: BMI 30.0 - 34.9 Obese Class I    Nutrition Interventions:   Continue current diet  Continued Inpatient Monitoring, Education declined, Coordination of Care    Nutrition Evaluation:   · Evaluation: Progressing toward goals   · Goals: PO >75% meals with low sodium and consistent carbohydrates    · Monitoring: Meal Intake, Patient/Family Education, Weight, Pertinent Labs, I&O      Electronically signed by Swapnil Sewell RD, LD on 5/22/19 at 7:50 AM    Contact Number: [phone]

## 2019-05-22 NOTE — PROGRESS NOTES
Phone: Kinjal  Date: 2019  Fax: [phone]      Physical Therapy    Daily Note    Patient Name: Kya Means      : 1940  (62 y.o.)  MRN: [id number]     [x] Patient requires additional Physical Therapy    [] Anticipate Physical Therapy Discharge Soon     Assessment                Sit to Stand: Independent  Stand to sit: Independent                Ambulation 1  Surface: level tile  Device: Rolling Walker  Other Apparatus: [email or web address])  Assistance: Modified Independent  Quality of Gait: steady with wheeled walker  Distance: 70 ft x  2  Comments: ---             Assessment: Patient in chair upon arrival visiting with daughter. He agrees to work with PTA but states he is tired and had low BS earlier this morning. Requests to have nurse take it prior to working with PTA which she does. Reading is good. Sit to stand from chair is independent. Ambulates with wheeled walker and supervision/mod I ~70 ft x2. Returns to room to sit at edge of bed. O2 levels drop to 88-89 but quickly recover back into the low to mid 90's without wearing O2. Patient completes limited exercises at edge of bed and then requests to lie down and rest.  States he will complete more therapy this afternoon. Daughter is present for entire session and for ambulation.   Discharge Recommendations: Home with assist PRN  Safety Devices  Type of devices: Call light within reach, Gait belt, Left in bed, Nurse notified(seated at edge of bed)          Time In: 1004  Time Out: 1025  Timed Coded Minutes: 21  Total Treatment Time: 21    Exercises:  See Flowsheets    Plan  Cont Per Plan Of Care    Goals  Short Term Goals  Time Frame for Short term goals:  4 days - expires 19  Short term goal 1: LE strength 4+-5/5 to complete reciprical stair ambulation to enter/exit home  Short term goal 2: Good endurance and LE strength to complete fucntional ambulation of 100 ft x 2 without fatigue and decreased balance increasing falls risk  Short term goal 3: Monitor 02 sats with progressive reduction of 02 and maintain > 90% to return home          [address] Number: PTA    Date: 5/22/2019

## 2019-05-22 NOTE — PROGRESS NOTES
Pt awake sitting at side of bed. Transferred to chair. Pillows supporting. No s/s of distress. Denies needs. Denies pain. Personal alarm on. Call light in reach. Will continue to monitor.

## 2019-05-22 NOTE — PROGRESS NOTES
Daughter comes to nurses station and states that she would like his blood sugar checked as he is feeling hot and sweaty. Room is warm as writer enters. Blood sugar of 270 obtained. Pt states that he just had a root beer float. Denies any complaints of pain but states that \"he doesn't feel good\". Unable to give specific complaint. Pt states that he does not need anything at this time.

## 2019-05-22 NOTE — PROGRESS NOTES
Pt is set up with a walker at [facility], all info faxed. Waiting for oxygen need to be determined. Referral out to [facility] care for nursing, family denied other providers. Pt set to be d/c on 5/23/19 with his daughter after lunch. Notice of non coverage signed.       CHRISTIANO Jimenez  5/22/2019

## 2019-05-22 NOTE — PLAN OF CARE
Problem: Falls - Risk of:  Goal: Will remain free from falls  Description  Will remain free from falls  5/22/2019 0453 by Marcelino Richard RN  Outcome: Met This Shift  5/21/2019 1529 by Patrick Bronson RN  Outcome: Met This Shift  Goal: Absence of physical injury  Description  Absence of physical injury  Outcome: Met This Shift     Problem: OXYGENATION/RESPIRATORY FUNCTION  Goal: Patient will maintain patent airway  Outcome: Met This Shift     Problem: HEMODYNAMIC STATUS  Goal: Patient has stable vital signs and fluid balance  Outcome: Met This Shift     Problem: FLUID AND ELECTROLYTE IMBALANCE  Goal: Fluid and electrolyte balance are achieved/maintained  Outcome: Met This Shift

## 2019-05-23 NOTE — PROGRESS NOTES
results for input(s): INR in the last 72 hours. Objective:   Vitals: BP (!) 115/44   Pulse 69   Temp 97.5 °F (36.4 °C) (Oral)   Resp 21   Ht 5' 8\" (1.727 m)   Wt 212 lb 8 oz (96.4 kg)   SpO2 94%   BMI 32.31 kg/m²   General appearance: alert and cooperative with exam  HEENT: Head: Normocephalic, no lesions, without obvious abnormality. Eye: Normal external eye, conjunctiva, lids cornea, REAL. Nose: Normal external nose, mucus membranes and septum. Neck: no adenopathy and supple, symmetrical, trachea midline  Lungs: clear to auscultation bilaterally  Heart: regular rate and rhythm, S1, S2 normal and systolic murmur. Abdomen: +BS, no pain with palpation. Abdomen is taught to palpation. Extremities: 2 + edema in the lower legs. Stockings on. Neurologic: Mental status: Alert, oriented, thought content appropriate    Assessment and Plan:   1. Generalized weakness - slowly improved. Patient plans on returning home today with his wife. Daughter lives across the road. 2. Acute on chronic systolic CHF with an EF of 35%. Stable on Bumex 2 mg BID. 3. Bilateral pleural effusions - improving on Bumex. 4. Pneumonia vs Bronchitis - improved with treatment of Levaquin / Vancomycin. 5. Acute on chronic renal failure - appears to be back to baseline. 6. DM II - placed back on 75/25. Adjusted secondary to low BS readings. 7. Chronic hip pain - on Tramadol prn.  8. Chronic anemia secondary to CKD and iron deficiency - on iron replacement. Plan:  1. Repeat labs this am.  2.  Respiratory to evaluate for home oxygen. 3.  Discharge home today.     Patient Active Problem List:     CAD (coronary artery disease)     Type 2 diabetes mellitus with stage 2 chronic kidney disease, with long-term current use of insulin (HCC)     Hyperlipemia     Shortness of breath     Normocytic hypochromic anemia     Chronic kidney disease, stage III (moderate) ([facility])     Vitamin D deficiency disease     Essential hypertension

## 2019-05-23 NOTE — PROGRESS NOTES
Discharged to home in wheelchair with daughter. Demonstrates understanding of discharge instructions given.

## 2019-05-23 NOTE — DISCHARGE SUMMARY
Hospitalist Discharge Summary    Juan Francisco Haddad  :  1940  MRN:  [id number]    Admit date:  2019  Discharge date:  19    Admitting Physician:  Sri Jasso MD    Discharge Diagnoses:   Generalized weakness. Acute on chronic systolic CHF. Cardiomyopathy (EF 35%). Acute on chronic renal failure - improved. Admission Condition:  fair      Discharged Condition:  good    Hospital Course: The patient is a 71 yo man who presented to ER c/o low blood sugar. His BS was found to be in 35s. He responded well to D50 initially but then his BS trended back down. He hadcno other complaints such as SOB, CP, abd.pain, leg swelling  He had a life vest for h/o cardiomyopathy  Work up in the ER showed normal electrolytes with a BUN of 62 and creatinine of 2.32, not much higher than baseline.  CBC showed a normal WBC with Hgb of 9.6, he has chronic anemia.  BNP was elevated at 17,854. Jennifer Furl showed persistent cardiomegaly with diffuse overt Pulmonary edema. Diony Michael was given a regular food in the ER with his BS staying in the low 100s. With the worries of a repeat in hypoglycemia Diony Michael was admitted as an observation. Due to pulmonary edema Dr. Bettie Tyler was consulted. The pt was placed in  ICU and started on Dobutamine drip and IV Lasix for CHF exacerbation in the context of worsening renal function. He initially improved but then developed increased cough with sputum production. pt was started on IV Levaquin for presumed bronchitis vs.early pneumonia. The following day steroids were added due to wheezing. He was developing worsening in renal function. Dobutamine drip was stopped on . His life vest was discontinued by Dr. Bettie Tyler. His 2D echo revealed EF 35%. Also, his lasix was changed to Demadex. Unfortunately he continued to be very SOB , did not have good urine output and continued to gain weight. Dr. Bettie Tyler ordered CT abd/pelvis to evaluate for possible ascites.  CT revealed moderate BL pleural effusions and third spacing. Nephrology was consulted to help with diuiretic vs, HD recommendations. Patient's demadex was stopped and he was started on IV Bumex 2 mg bid. Patient's urine output greatly improved, he did not need urgent RRT. IV Bumex was changed to oral .   Patient very weak and deconditioned from hospitalization due to multiple acute problems. PT/OT was consulted. He is recommended to continue with therapy to improve his strength  and balance. Nikolay Harmon was admitted into our swing bed program with PT / OT daily. He maintained on oral Bumex with continued improvement in his renal function and edema. His oxygen was weaned gradually with his oxygen saturations maintaining in the 90s on room air at rest.  Respiratory was consulted at discharge to test for home oxygen qualification. He did require nasal oxygen to maintain oxygen saturations > 88% on room air with exertion. His oxygen saturations were above 88% at rest without oxygen. Home oxygen was arranged at discharge. With time his strength improved to the point his family felt they could care for him at home. During his admission his insulin had to be adjusted secondary to hypoglycemia. Nikolay Harmon was instructed to monitor his BS closely at home and record to take to Dr. Monalisa Sanders appointments. On occasion, Nikolay Harmon would require Ativan for anxiety. He felt he would be a lot better once he returned home. With his improvement it was felt he could be discharged with close out patient follow up with Dr. Rick Gaviria, Nephrology, and Dr. Zonia Pink in Cardiology. Discharge Exam:    Vitals: BP (!) 115/44   Pulse 69   Temp 97.5 °F (36.4 °C) (Oral)   Resp 21   Ht 5' 8\" (1.727 m)   Wt 212 lb 8 oz (96.4 kg)   SpO2 94%   BMI 32.31 kg/m²   General appearance: alert and cooperative with exam  HEENT: Head: Normocephalic, no lesions, without obvious abnormality. Eye: Normal external eye, conjunctiva, lids cornea, REAL.   Nose: Normal external nose, mucus membranes and septum. Neck: no adenopathy and supple, symmetrical, trachea midline  Lungs: clear to auscultation bilaterally  Heart: regular rate and rhythm, S1, S2 normal and systolic murmur. Abdomen: +BS, no pain with palpation. Abdomen is taught to palpation. Extremities: 2 + edema in the lower legs. Stockings on. Neurologic: Mental status: Alert, oriented, thought content appropriate        Discharge Medications:       Sae Blount   Home Medication Instructions WNP:[id number]    Printed on:05/23/19 0612   Medication Information                      Ascorbic Acid (VITAMIN C) 500 MG tablet  Take 500 mg by mouth daily             aspirin 81 MG tablet  Take 81 mg by mouth daily             Blood Glucose Monitoring Suppl (ONE TOUCH ULTRA MINI) w/Device KIT  Use to test sugar twice daily             blood glucose test strips (ASCENSIA AUTODISC VI;ONE TOUCH ULTRA TEST VI) strip  One touch ultra strips.  Test blood sugar BID,DIAGNOIS: TYPE II             Blood Pressure Monitor MISC  Please dispense blood pressure monitor per patient's insurance             bumetanide (BUMEX) 2 MG tablet  Take 1 tablet by mouth 2 times daily             carvedilol (COREG) 25 MG tablet  Take 1 tablet by mouth 2 times daily             DULoxetine (CYMBALTA) 20 MG extended release capsule  Take 1 capsule by mouth daily             famotidine (PEPCID) 20 MG tablet  TAKE 1 TABLET BY MOUTH EVERY DAY             ferrous sulfate 325 (65 FE) MG tablet  Take 325 mg by mouth 2 times daily              fluticasone (FLONASE) 50 MCG/ACT nasal spray  1 spray by Nasal route daily Both nostrils daily             gemfibrozil (LOPID) 600 MG tablet  Take 600 mg by mouth daily             hydrALAZINE (APRESOLINE) 100 MG tablet  TAKE 1 TABLET BY MOUTH THREE TIMES DAILY             Insulin NPH Isophane & Regular (HUMULIN 70/30 KWIKPEN) (70-30) 100 UNIT per ML injection pen  Inject 20 Units into the skin daily (with breakfast)             Insulin NPH Isophane & Regular (HUMULIN 70/30 KWIKPEN) (70-30) 100 UNIT per ML injection pen  Inject 10 Units into the skin Daily with supper             Insulin Syringe-Needle U-100 (B-D INS SYR ULTRAFINE 1CC/30G) 30G X 1/2\" 1 ML MISC  Use 2 daily   DX: E11.9             isosorbide mononitrate (IMDUR) 60 MG extended release tablet  TAKE 1 TABLET BY MOUTH TWICE DAILY             Multiple Vitamins-Minerals (MULTIVITAMIN ADULT PO)  Take 1 tablet by mouth daily              NIFEdipine (PROCARDIA XL) 60 MG extended release tablet  TAKE 1 TABLET BY MOUTH TWICE DAILY             ONE TOUCH ULTRASOFT LANCETS MISC  Test blood sugar twice daily             simvastatin (ZOCOR) 40 MG tablet  TAKE 1 TABLET BY MOUTH NIGHTLY             sodium bicarbonate 650 MG tablet  Take 1 tablet by mouth 2 times daily                 Consults:  Dr. Rosenda Castro (Cardiology), Nephrology, PT / OT. Significant Diagnostic Studies:  Labs. Treatments:   PT / OT daily, oral Bumex, Oxygen, Humalog sliding scale. Disposition:   Home. Discharge Instructions:  Resume previous home medication but discontinue Aldactone. Increase Coreg to 25 mg two times a day. Increase Bumex to 2 mg two times a day. Decrease Humulin 70/30 to 20 units with breakfast and 10 units at supper. Monitor BS readings and record to take to Dr. Apryl Johnson appointment. Nasal oxygen at 2 l/m with exertion. Follow up with Nephrology tomorrow (already scheduled). Follow up with Dr. Cedrick Pollard in 4 to 6 weeks. Home care for nursing to evaluate vitals, weight, and medication changes / compliance. Activity:  As tolerated. Diet:  Diabetic / low sodium. Follow up with Meseret Ma MD in 1 week. Discharge time =  35 minutes.      Signed:  Robles Back  5/23/2019, 6:41 AM

## 2019-05-23 NOTE — PROGRESS NOTES
Patient SPO2 92% RA. Patient started walk, 1 minute RA 92% HR 74. 2 minutes 89% RA HR 76. 3 minutes 86% RA HR 78. Patient was placed on 2 LPM NC SPO2 increased to 94%. Patient was able to complete walk on 2 LPM NC with SPO2 above 94%. Patient returned to bed and recovered quickly being placed back on RA SPO2 remained above 92% on RA.

## 2019-05-23 NOTE — PROGRESS NOTES
Met with Patient and daughter this p.m. Daughter wants to know the particulars of home health referral.  States she originally thought that insurance (Medicare) would not cover home care. Explained that someone from home care would be calling to schedule their initial visit and that based on their assessment of Patient, this would determine how many times per week they would be coming and what services they would be offering. Daughter states that she views this as an \"added bonus\" and a relief for her mother. Also discussed private duty aide services. Daughter knows someone who provides this type of service but appreciative of offer to provide list of agencies. Patient is preparing for discharge. Daughter will be transporting.     Yuniel. Halima Velasquez[address]  5/23/2019

## 2019-05-23 NOTE — PROGRESS NOTES
Patient taken off O2 after Respiratory treatments. SpO2 checked on Left Ear with 1 L. Nasal Cannula O2, Spo2 was 99%. Patient walked into restroom and returned to bed. Immediately rechecked pulse ox on Left Ear, SpO2 = 94%.

## 2019-05-23 NOTE — PLAN OF CARE
Problem: Falls - Risk of:  Goal: Will remain free from falls  Description  Will remain free from falls  5/23/2019 0249 by Brian Mireles RN  Outcome: Met This Shift  5/22/2019 1653 by Yesy Morrow RN  Outcome: Ongoing

## 2019-05-24 NOTE — PROGRESS NOTES
Pt stopped in to cardiac rehab today after being discharged from hospital yesterday 5/23/19. After speaking with daughter Melissa Harvey, it has been decided pt will remain on medical hold at this time until completed with home health services and Karrie Farmer regains some strength. They will call and reschedule for Karrie Farmer when he is able to return.

## 2019-05-24 NOTE — TELEPHONE ENCOUNTER
Ok to order home PT and OT. I am booked next week tues and Monday holiday. If you see anything Wed or Thurs try but we can re look Tues in case someone cancels. If having that much problems then why did swing bed send him home?

## 2019-05-24 NOTE — LETTER
[address] MED SURG TELEMETRY  [address] [address]  Phone: [phone]    No name on file. May 25th 2019,      To whom it may concern:    Laurent Grant was present in our facility 5/25/19 with a family member currently under hospice care.        Sincerely,        Radha Solorio RN

## 2019-05-24 NOTE — CARE COORDINATION
Marian[facility] 45 Transitions Initial Follow Up Call    Call within 2 business days of discharge: Yes    Patient: Jorge Luis Montes De Oca Patient : 1940   MRN: [id number]  Reason for Admission:   Discharge Date: 19 RARS: Readmission Risk Score: 31      Last Discharge [address]       Complaint Diagnosis Description Type Department Provider    19  Ischemic cardiomyopathy . .. Admission (Discharged) David Saunders 2E MS Michelel Franklin MD           Spoke with: Pt's wife and daughter    Facility: [facility]    Non-face-to-face services provided:  Obtained and reviewed discharge summary and/or continuity of care documents  Communication with home health agencies or other community services the patient is currently using-[facility]  Assessment and support for treatment adherence and medication management-completed 1111f    Care Transitions 24 Hour Call    Do you have any ongoing symptoms?:  Yes  Patient-reported symptoms:  Shortness of Breath, Weight Gain, Weakness, Other (Comment: )  Do you have a copy of your discharge instructions?:  Yes  Do you have all of your prescriptions and are they filled?:  Yes  Have you been contacted by a [address]?:  No  Have you scheduled your follow up appointment?:  Yes  How are you going to get to your appointment?:  Car - family or friend to transport  Were you discharged with any Home Care or Post Acute Services:  Yes  Post Acute Services:  Home Health (Comment: [facility])  Patient DME:  Straight cane  Do you have support at home?:  Partner/Spouse/SO  Are you an active caregiver in your home?:  No  Care Transitions Interventions       CTC spoke to pt's wife Valerie Steele who stated pt is currently at Cardiac Rehab. Stated pt is very weak and SOB, has supplemental 02 at 2 LPM. Stated FBS this a.m. was 280. Wife handed phone to pt's daughter. CTC reviewed medications with pt's daughter. Pt has all medications except sodium bicarb which daughter is picking up today. 6/24/2019 10:30 AM [facility] CARD REHAB THERAPIST 3 [facility] CARD [place]   6/26/2019 10:30 AM [facility] CARD REHAB THERAPIST 3 [facility] CARD [place]   6/28/2019 10:30 AM [facility] CARD REHAB THERAPIST [address] [place] [address] Silvina Toussaint RN

## 2019-05-24 NOTE — TELEPHONE ENCOUNTER
I sent message to my nurse but with holiday next week very booked. Will see Tues if any cancellation for Wed or Thurs next week. SOrry.  Froy Garcia for PT/OT my nurse will order for himsch

## 2019-05-24 NOTE — CARE COORDINATION
[facility] 45 Transitions Initial Follow Up Call    Call within 2 business days of discharge: Yes    Patient: Joelle Gibson Patient : 1940   MRN: [id number]  Reason for Admission: ischemic cardiomyopathy  Discharge Date: 19 RARS: Readmission Risk Score: 31      Last Discharge [address] [address]       Complaint Diagnosis Description Type Department Provider    19  Ischemic cardiomyopathy . .. Admission (Discharged) [address] 2E Julissa Null MD               [facility]    Attempted to contact patient for transitions call. Contact information left to  requesting call back at the earliest convenience. CTC called [facility] and verified home care order for SN.  Will continue to attempt to reach    Abner Agee RN BSN   Care Transitions Coordinator  [phone]         Care Transitions 24 Hour Call    Do you have all of your prescriptions and are they filled?:  Yes  Patient DME:  Straight cane  Do you have support at home?:  Partner/Spouse/SO  Are you an active caregiver in your home?:  No  Care Transitions Interventions         Follow Up  Future Appointments   Date Time Provider Laura Franco   2019 10:00 AM [address] [place]   2019 10:00 AM [place] CARD REHAB THERAPIST [address] [place]   6/3/2019 10:30 AM [place] CARD REHAB THERAPIST 3 [facility] CARD [place]   2019  4:15 PM MD JOESPH [facility]   2019 10:30 AM [place] CARD REHAB THERAPIST 3 [facility] CARD [place]   2019 10:30 AM [place] CARD REHAB THERAPIST 3 [facility] CARD [place]   6/10/2019 11:00 AM [place] CARD REHAB THERAPIST 3 [facility] CARD [place]   2019 10:30 AM [place] CARD REHAB THERAPIST 3 [facility] CARD [place]   2019 10:30 AM [facility] ECHO ROOM ECHO PINK [facility] ECHO [place]   2019 10:30 AM [facility] CARD REHAB THERAPIST 3 [facility] CARD [place]   2019 10:30 AM [facility] CARD REHAB THERAPIST 3 [facility] CARD [place]   2019 10:30 AM [place] CARD REHAB THERAPIST 3 [facility] CARD [place]   2019  1:30 PM Frankie Neves MD Naheed Jordan [facility]   6/21/2019 10:30 AM [facility] CARD REHAB THERAPIST 3 [facility] CARD [place]   6/24/2019 10:30 AM [facility]ARD RE[facility]AB THERAPIST [facility]   6/26/2019 10:30 AM [facility] CARD REHAB THERAPIST 3 [facility]   6/28/2019 10:30 AM [facility] CARD REHAB THERAPIST [address] [place] [address] Michelle Moulton RN

## 2019-05-24 NOTE — TELEPHONE ENCOUNTER
Patient nurse care coordinator calling stating they just got off the phone with the patient and his family and he is having difficulty at home and the nurse was hoping that he was able to get into the office sooner.  She is asking if you can review the schedule to squeeze him into an appointment sooner  Patient also is having a very hard time getting around the house and they thought he would benefit from Physical and occupational therapy

## 2019-05-25 PROBLEM — I50.21: Status: ACTIVE | Noted: 2019-01-01

## 2019-05-25 NOTE — ED NOTES
Wife and daughters at bedside, ED physician explaining pt's condition at this time;      Asa Sicard, RN  05/24/19 3980

## 2019-05-25 NOTE — PROGRESS NOTES
Chilo Almeida from [facility], reports off to this nurse. Instructed to call [facility] with any changes or concerns.

## 2019-05-25 NOTE — ED NOTES
Hospice nurse informed this nurse that pt is officially admitted with [facility], she is making family aware now;      [facility]  05/25/19 6981

## 2019-05-25 NOTE — PLAN OF CARE
Problem: Pain:  Goal: Pain level will decrease  Description  Pain level will decrease  Outcome: Ongoing   Does not draw legs up after pain medication is given. Respirations shallow and labored.

## 2019-05-25 NOTE — ED NOTES
[facility] hospice nurse here now and meeting with family;      Pineda March, RN  05/25/19 0762

## 2019-05-25 NOTE — H&P
Hospital Medicine  History and Physical    Patient:  Jorge Luis Montes De Oca  MRN: [id number]    CHIEF COMPLAINT:  Unresponsive     History Obtained From:  electronic medical record  PCP: Kathy Nielsen MD    HISTORY OF PRESENT ILLNESS:   The patient is a 70 y.o. male who was discharged from the hospital 2 days ago after being treated for acute on chronic systolic CHF and acute on chronic renal failure. Apparently Charissa Cardenas went home and was doing okay until he was found by the family to be unresponsive. EMS was called as they felt his blood sugar was likely low. FSBS was 180 when EMS arrived. He was found to be hypoxic and a nonrebreather was placed with his oxygen saturations improving into the 90s. He was brought to the ER for further evaluation. In the ER he was found to be bradycardic with atropine administered with no significant improvement. An external pacer was then placed. Labs showed a sodium of 129, potassium of 7.4, Chloride of 93, TCO2 of 16, BUN 90, and creatinine of 3.57. His glucose was 186. Troponin was elevated at 0.09. CBC showed a WBC of 8.9 with Hgb of 9.0. His SBP was in the 80s. Dr. Farheen Curiel had a long discussion with the family and they decided on comfort care with Hospice with his chronic decline in health that had significantly worsened recently. Hospice was consulted in the ER and agreed to admission to [facility] with Hospice care.       Past Medical History:        Diagnosis Date    CAD (coronary artery disease)     MI on July 15, 2012    CHF (congestive heart failure) (HCC)     Chicken pox     Chronic kidney disease     CKD stage 3 due to type 2 diabetes mellitus (HCC)     Heart failure (HCC)     Hypertension     Measles     Mumps     Osteoarthritis     Tracheal obstruction 9/2012    trach after intubation for MI    Type II or unspecified type diabetes mellitus without mention of complication, not stated as uncontrolled        Past Surgical History:        Procedure Laterality Date    CARDIAC CATHETERIZATION Left 10/09/2018    Dr. Homero Lockett  [facility]    [facility]ON[facility] GRAFT  2010    [facility]    EYE SURGERY Bilateral     Cataract removal    TRACHEOTOMY         Medications Prior to Admission:    Prior to Admission medications    Medication Sig Start Date End Date Taking? Authorizing Provider   carvedilol (COREG) 25 MG tablet Take 1 tablet by mouth 2 times daily 5/23/19   Avis Dudley MD   bumetanide (BUMEX) 2 MG tablet Take 1 tablet by mouth 2 times daily 5/23/19   Robles Dudley MD   Insulin NPH Isophane & Regular (HUMULIN 70/30 KWIKPEN) (70-30) 100 UNIT per ML injection pen Inject 20 Units into the skin daily (with breakfast) 5/23/19   Robles Dudley MD   Insulin NPH Isophane & Regular (HUMULIN 70/30 KWIKPEN) (70-30) 100 UNIT per ML injection pen Inject 10 Units into the skin Daily with supper 5/23/19   Avis Dudley MD   sodium bicarbonate 650 MG tablet Take 1 tablet by mouth daily 5/23/19   Cassidy Gage MD   DULoxetine (CYMBALTA) 20 MG extended release capsule Take 1 capsule by mouth daily 5/2/19   Cassidy Gage MD   famotidine (PEPCID) 20 MG tablet TAKE 1 TABLET BY MOUTH EVERY DAY 4/29/19   Kayla Cazares MD   Blood Glucose Monitoring Suppl (ONE TOUCH ULTRA MINI) w/Device KIT Use to test sugar twice daily 4/9/19   Kayla Cazares MD   aspirin 81 MG tablet Take 81 mg by mouth daily    Historical Provider, MD   ONE TOUCH ULTRASOFT LANCETS MISC Test blood sugar twice daily 3/26/19   Kayla Cazares MD   blood glucose test strips (ASCENSIA AUTODISC VI;ONE TOUCH ULTRA TEST VI) strip One touch ultra strips.  Test blood sugar BID,DIAGNOIS:SM TYPE II 3/26/19   Kayla Cazares MD   NIFEdipine (PROCARDIA XL) 60 MG extended release tablet TAKE 1 TABLET BY MOUTH TWICE DAILY 3/2/19   Kayla Cazares MD   hydrALAZINE (APRESOLINE) 100 MG tablet TAKE 1 TABLET BY MOUTH THREE TIMES DAILY 1/29/19   Historical Provider, MD   isosorbide Admit to the floor with Hospice care per family request.    2.  IV Morphine prn for pain or respiratory distress. 3.  IV Ativan prn for agitation / anxiety. 4.  Atropine drops for secretions. 5.  Continue oxygen per non re-breather mask. 6.  Saline lock IV. The beneficiary may reasonably be expected to be discharged or transferred to a hospital within 96 hours after admission.       Patient Active Problem List   Diagnosis Code    CAD (coronary artery disease) I25.10    Type 2 diabetes mellitus with stage 2 chronic kidney disease, with long-term current use of insulin ([facility]) E11.22, N18.2, Z79.4    Hyperlipemia E78.5    Shortness of breath R06.02    Normocytic hypochromic anemia D50.9    Chronic kidney disease, stage III (moderate) ([facility]) N18.3    Vitamin D deficiency disease E55.9    Essential hypertension I10    Acute on chronic systolic CHF (congestive heart failure) ([facility]) I50.23    GIB (gastrointestinal bleeding) K92.2    Blood in stool K92.1    Ischemic cardiomyopathy I25.5    Hypoglycemia E16.2    Physical deconditioning R53.81    End-stage systolic heart failure, acute ([facility] [address] I50.21       Haroon Dudley MD  Admitting Hospitalist

## 2019-05-25 NOTE — PLAN OF CARE
Problem: Risk for Impaired Skin Integrity  Goal: Tissue integrity - skin and mucous membranes  Description  Structural intactness and normal physiological function of skin and  mucous membranes.   Outcome: Met This Shift  Reposition every 2 hours

## 2019-05-25 NOTE — PROGRESS NOTES
Malcolm Bathe resting in bed at this time. Occasionally draws his arms and legs towards his chest.  Pillows under arms for pt comfort. Family in waiting room and occasionally comes back to visit with pt. Pt resting comfortably at this time.

## 2019-05-25 NOTE — ED NOTES
Pt's blood sugar upon arrival was 187; Pt able to open his eyes and look at this nurse but not able to verbalize;  Pt arrived on a bag mask with Sp02 maintaining, pt breathing on his own and a non-rebreather mask applied with 15 liters of o2;      Monica Crockett RN  05/24/19 9645

## 2019-05-25 NOTE — ED NOTES
Family has decided to go with [facility] for pt at this time d/t pt's condition;      Prema Adams RN  05/24/19 [address]

## 2019-05-25 NOTE — ED NOTES
Pt continues to be restless, PRN ativan and morphine administered as ordered, awaiting results;      Dinesh Cheung RN  05/25/19 2216

## 2019-05-25 NOTE — PROGRESS NOTES
Order clarification with Dr Minerva Valencia. Order for 1 mg Ativan IV every 2 hours PRN. D/C order for 0.5 mg IV morphine every 4 hours PRN.

## 2019-05-25 NOTE — PROGRESS NOTES
Occasionally pulling legs up towards chest. Multiple family at bedside. Daughter requests \"something for pain, he just looks like he is hurting. \" Informed that he had pain medication at 0921 but that he had anxiety medication available. IV ativan given. Family denies further needs.

## 2019-05-25 NOTE — PROGRESS NOTES
Spoke with Rich Matta from [facility] - faxed a copy of chart, [facility], & face sheet to Rich Matta.   Hospice stated they will be en route to [facility] shortly after receiving faxed documents

## 2019-05-25 NOTE — ED PROVIDER NOTES
Cardiac catheterization (Left, 10/09/2018). CURRENT MEDICATIONS       Previous Medications    ASCORBIC ACID (VITAMIN C) 500 MG TABLET    Take 500 mg by mouth daily    ASPIRIN 81 MG TABLET    Take 81 mg by mouth daily    BLOOD GLUCOSE MONITORING SUPPL (ONE TOUCH ULTRA MINI) W/DEVICE KIT    Use to test sugar twice daily    BLOOD GLUCOSE TEST STRIPS (ASCENSIA AUTODISC VI;ONE TOUCH ULTRA TEST VI) STRIP    One touch ultra strips.  Test blood sugar BID,DIAGNOIS:SM TYPE II    BLOOD PRESSURE MONITOR MISC    Please dispense blood pressure monitor per patient's insurance    BUMETANIDE (BUMEX) 2 MG TABLET    Take 1 tablet by mouth 2 times daily    CARVEDILOL (COREG) 25 MG TABLET    Take 1 tablet by mouth 2 times daily    DULOXETINE (CYMBALTA) 20 MG EXTENDED RELEASE CAPSULE    Take 1 capsule by mouth daily    FAMOTIDINE (PEPCID) 20 MG TABLET    TAKE 1 TABLET BY MOUTH EVERY DAY    FERROUS SULFATE 325 (65 FE) MG TABLET    Take 325 mg by mouth 2 times daily     FLUTICASONE (FLONASE) 50 MCG/ACT NASAL SPRAY    1 spray by Nasal route daily Both nostrils daily    GEMFIBROZIL (LOPID) 600 MG TABLET    Take 600 mg by mouth daily    HYDRALAZINE (APRESOLINE) 100 MG TABLET    TAKE 1 TABLET BY MOUTH THREE TIMES DAILY    INSULIN NPH ISOPHANE & REGULAR (HUMULIN 70/30 KWIKPEN) (70-30) 100 UNIT PER ML INJECTION PEN    Inject 20 Units into the skin daily (with breakfast)    INSULIN NPH ISOPHANE & REGULAR (HUMULIN 70/30 KWIKPEN) (70-30) 100 UNIT PER ML INJECTION PEN    Inject 10 Units into the skin Daily with supper    INSULIN SYRINGE-NEEDLE U-100 (B-D INS SYR ULTRAFINE 1CC/30G) 30G X 1/2\" 1 ML MISC    Use 2 daily   DX: E11.9    ISOSORBIDE MONONITRATE (IMDUR) 60 MG EXTENDED RELEASE TABLET    TAKE 1 TABLET BY MOUTH TWICE DAILY    MULTIPLE VITAMINS-MINERALS (MULTIVITAMIN ADULT PO)    Take 1 tablet by mouth daily     NIFEDIPINE (PROCARDIA XL) 60 MG EXTENDED RELEASE TABLET    TAKE 1 TABLET BY MOUTH TWICE DAILY    ONE TOUCH ULTRASOFT LANCETS [facility] Test blood sugar twice daily    SIMVASTATIN (ZOCOR) 40 MG TABLET    TAKE 1 TABLET BY MOUTH NIGHTLY    SODIUM BICARBONATE 650 MG TABLET    Take 1 tablet by mouth daily       ALLERGIES     is allergic to lisinopril. FAMILY HISTORY     indicated that his mother is . He indicated that his father is . He indicated that his sister is alive. family history includes Diabetes type 2  in his sister; Heart Attack in his father and mother. SOCIAL HISTORY      reports that he quit smoking about 50 years ago. His smoking use included cigarettes. He has a 0.80 pack-year smoking history. He has never used smokeless tobacco. He reports that he drank alcohol. He reports that he does not use drugs. PHYSICAL EXAM     INITIAL VITALS:  temporal temperature is 96.7 °F (35.9 °C). His blood pressure is 82/48 (abnormal) and his pulse is 95. His respiration is 22 and oxygen saturation is 98%. Physical Exam   Constitutional: Patient appears awake, does appear to withdraw from pain, and a few times did appear to word  \"yes\" to the respiratory therapist and once to daughter. HENT:   Head: Normocephalic and atraumatic. Head is without contusion. Right Ear:  external ear normal. No drainage. Left Ear:  external ear normal. No drainage. Nose: Nose normal. No nasal deformity. No epistaxis. Mouth/Throat: Mucous membranes are slight dry. Eyes: Right eye appears chronically open, left eye is closed with some mucoid product noted along the eyelid margin  Neck: Edema to the level of the neck  Cardiovascular:  Normal rate, regular rhythm and intact distal pulses.    Pitting edema 3+ bilateral lower extremities, with edema extending up to the patient's level of neck  Pulses: Right radial pulse  2+   Pulmonary/Chest: Increased effort, with decreased lung aeration on auscultation  Abdominal: Patient with abdominal distention, slightly tense   Musculoskeletal:   Negative acute trauma or deformity, noted some movement of right upper extremity though not purposeful, occasional spastic movement of right lower extremity, no movement of left-sided body   Neurological: Patient appears awake the level of alertness indeterminate, withdraws to some painful stimuli in the right upper extremity    Skin: Skin is warm and dry. Patient is not diaphoretic. Psychiatric: Unable to determine      DIFFERENTIAL DIAGNOSIS:   CHF exacerbation, respiratory failure, CVA/TIA,    DIAGNOSTIC RESULTS     EKG: All EKG's are interpreted by the Emergency Department Physician who either signs or Co-signs this chart in the absence of a cardiologist.  EKG    The patient had an EKG which is interpreted by me in the absence of a Cardiologist.   [] Without comparison to previous.    [] With comparison to a previous EKG Dated     EMS EKG - AF with slow vent response, rate ~40, noted PVC, normal axis      RADIOLOGY: non-plain film images(s) such as CT, Ultrasound and MRI are read by the radiologist.  No orders to display       LABS:   Labs Reviewed   CBC WITH AUTO DIFFERENTIAL - Abnormal; Notable for the following components:       Result Value    RBC 3.29 (*)     Hemoglobin 9.0 (*)     Hematocrit 27.5 (*)     RDW 17.2 (*)     Seg Neutrophils 85 (*)     Lymphocytes 7 (*)     Segs Absolute 7.50 (*)     Absolute Lymph # 0.60 (*)     All other components within normal limits   BASIC METABOLIC PANEL - Abnormal; Notable for the following components:    Glucose 186 (*)     BUN 90 (*)     CREATININE 3.57 (*)     Bun/Cre Ratio 25 (*)     Calcium 8.5 (*)     Sodium 129 (*)     Potassium 7.4 (*)     Chloride 93 (*)     CO2 16 (*)     Anion Gap 20 (*)     GFR Non- 17 (*)     GFR  20 (*)     All other components within normal limits   TROPONIN - Abnormal; Notable for the following components:    Troponin T 0.09 (*)     All other components within normal limits   PROTIME-INR - Abnormal; Notable for the following components:    Protime 13.3 (*) care time was 60 minutes. This excludes any time for separately reportable procedures. CONSULTS:  Consult to [address]      1. Cardiorenal syndrome with renal failure, stage 1-4 or unspecified chronic kidney disease, with heart failure ([facility]    2. Admission for hospice care    3. History of congestive heart failure    4. History of ischemic heart disease          DISPOSITION/PLAN   Admit for Hospice    PATIENT REFERRED TO:  No follow-up provider specified. DISCHARGE MEDICATIONS:  New Prescriptions    No medications on file           Summation      Patient Course:  Admit for Hospice    ED Medications administered this visit:    Medications   calcium gluconate 1 g in dextrose 5 % 100 mL IVPB (1 g Intravenous New Bag 5/25/19 0030)   furosemide (LASIX) injection 80 mg (80 mg Intravenous Given 5/24/19 3517)       New Prescriptions from this visit:    New Prescriptions    No medications on file       Follow-up:  No follow-up provider specified. Final Impression:   1. Cardiorenal syndrome with renal failure, stage 1-4 or unspecified chronic kidney disease, with heart failure ([facility] [address]    2. Admission for hospice care    3. History of congestive heart failure    4.  History of ischemic heart disease               (Please note that portions of this note were completed with a voice recognition program.  Efforts were made to edit the dictations but occasionally words are mis-transcribed.)    MD Nicholas Prieto MD  05/25/19 9490

## 2019-05-25 NOTE — PROGRESS NOTES
Ludy Michele resting in bed at this time. Occasionally draws legs toward chest. Repositioned for comfort. Daughter at bedside, tearful at this time. States that family are in the waiting room and that she \"is just checking on him. \" Emotional support provided. Denies further needs.

## 2019-05-25 NOTE — PROGRESS NOTES
Bath complete. Moans at times and pulls away from staff. Does not open eyes. Closes mouth around mouth swab. Gown and top sheet changed. Repositioned onto left side. Pain medication provided.

## 2019-05-25 NOTE — ED NOTES
This nurse and ED physician discussed pt's code status with pt's wife and family, they state understanding and decision made to change code status to [facility] and to consult [facility];  Chaplain busch, hospitalist consulted and [facility] phoned as well for consult;      Mirtha Stone RN  05/25/19 2442

## 2019-05-26 NOTE — PROGRESS NOTES
artifact. 3. Acute on chronic systolic CHF. 4. Acute on chronic renal failure. 5. Hyperkalemia - calcium gluconate given in the ER. 6. Severe Hypotension - SBP has improved since admission. 7. Acute metabolic acidosis. 8. Acute Hypoxic respiratory failure - SPO2 in the 90s on a Venti mask. 9. Chronic anemia - stable. 10.   DM II - insulin on hold. Plan:  1. Continue the current care as patient appears comfortable.         Patient Active Problem List:     CAD (coronary artery disease)     Type 2 diabetes mellitus with stage 2 chronic kidney disease, with long-term current use of insulin ([facility])     Hyperlipemia     Shortness of breath     Normocytic hypochromic anemia     Chronic kidney disease, stage III (moderate) ([facility])     Vitamin D deficiency disease     Essential hypertension     Acute on chronic systolic CHF (congestive heart failure) ([facility])     GIB (gastrointestinal bleeding)     Blood in stool     Ischemic cardiomyopathy     Hypoglycemia     Physical deconditioning     End-stage systolic heart failure, acute ([facility] [address]      Robles Dudley MD  [facility] Hospitalist

## 2019-05-26 NOTE — PROGRESS NOTES
Spoke with patients daughter at bedside. Daughter very tearful at this time. States that she is just not sure of they made the right decision not to transfer patient. Emotional support provided. Ren Sandoval able to answer yes or no questions at times. Denies being in pain or uncomfortable at this time. Daughter states that his [facility] will be coming this evening. Denies further needs at this time.

## 2019-05-26 NOTE — PROGRESS NOTES
Pulling at mask. Family at bedside. IV ativan given. When asked if we can reapply mask states \"later. \" Family leaves room. Complete bath given. 750 ml urine emptied from cruz. More alert and verbal this afternoon, however he does not open eyes. Repositioned onto right side. Declines to put mask back on but is aggreable to NC.  Currently 91% on 4L

## 2019-05-26 NOTE — PROGRESS NOTES
MEDICAL NUTRITION THERAPY- SCREENING   Ángel Meza is a 70 y.o.  male     Admission Date: 5/24/2019    Principal Problem:  End stage CHF w/hospice    Patient nutritionally screened per comfort care plan / hospice. Current intakes on NPO diet are nil. Food and fluid as aids comfort is recommended. Body mass index is 32.51 kg/m². indicating Obese. Clinical nutrition services will continue to be available as indicated or desired by patient or family. Follow-up and re-evaluate as needed.     Electronically signed by Behzad Hairston RD, LD on 5/26/2019 at 8:32 AM

## 2019-05-26 NOTE — PROGRESS NOTES
Turn and repositioned pt onto his left side. Pt tolerated well. Ativan given for restlessness and groaning. Will continue to monitor.

## 2019-05-26 NOTE — PLAN OF CARE
Problem: Pain:  Goal: Control of acute pain  Description  Control of acute pain  Outcome: Ongoing   Pain well controlled with IV pain medications at this time.

## 2019-05-27 NOTE — PROGRESS NOTES
Family out to nurses station to ask for \"medication for his restlessness\". Draws legs up to chest, squirms in bed. See eMAR.

## 2019-05-27 NOTE — PROGRESS NOTES
Family member comes to desk and asks for something for sedation for Manuelsabaantonino Hire as they feel his breathing has become more labored. Too soon for atropine but given morphine. Repositioned-family feels respirations are back to \"where they were\". Does have periods of apnea at times as he has had throughout the day.

## 2019-05-27 NOTE — PROGRESS NOTES
In for assessment. Several family members in at bedside. Questions answered and support given. Family out of room for am care. Pt answers yes/no to questions, squeezes hands appropriately, nods head yes/no but does not open eyes-right eye remains semi open due to prior injury. Opens mouth for oral care, reaches up with left arm to scratch nose. Answers simple questions by nodding head. Family returns to bedside. Questions answered and support given. Hospice nurse in to see pt and speak with family.

## 2019-05-27 NOTE — PROGRESS NOTES
Family feels that ativan works best to [employer] and ask frequently for atropine drops for drying secretions. Morphine used throughout shift for \"discomfort\". Family pleased with care pt has received and want to see Silver Sydnie comfortable. Oral care provided with turns. Pt has become less responsive throughout shift but appears more comfortable. BLE diminishing and now 2+ with some wrinkles appearing on toes. Edema in hands also decreasing-elvie left hand. Both hands and feet up on pillows. Many family members in and out-all questions answered and support given. Comfort cart remains at bedside.

## 2019-05-27 NOTE — PROGRESS NOTES
Less restless and resting more comfortably per family after morphine. Dr. Youngblood Heart in to see patient.

## 2019-05-27 NOTE — PROGRESS NOTES
Repositioned onto left side. Moans and is somewhat restless after turn. Family asks for pt to be [employer] Oral care provided with turns.

## 2019-05-27 NOTE — PROGRESS NOTES
Hospitalist Progress Note  5/27/2019 8:48 AM  Subjective:   Admit Date: 5/24/2019  PCP: Trish Pardo MD    Interval History:     The patient was more alert earlier this morning according to nursing staff. His daughter is at bedside. Patient is unresponsive presently. He looks comfortable. He gets episodes of restlessness and agitation. May need to increase morphine from 1 mg to 2 mg to keep the patient comfortable. Diet: Diet NPO Effective Now  Medications:   Scheduled Meds:   isosorbide mononitrate  60 mg Oral BID    sodium chloride flush  10 mL Intravenous 2 times per day     Continuous Infusions:  PRN Medications: morphine, lubrifresh P.M., LORazepam, sodium chloride flush, magnesium hydroxide, ondansetron, acetaminophen, atropine    Objective:   Vitals: BP (!) 122/51   Pulse 51   Temp 97.5 °F (36.4 °C) (Temporal)   Resp 20   Ht 5' 8\" (1.727 m)   Wt 199 lb 14.4 oz (90.7 kg)   SpO2 98%   BMI 30.39 kg/m²   BMI: Body mass index is 30.39 kg/m². CBC:   Recent Labs     05/24/19  2300   WBC 8.9   HGB 9.0*        BMP:    Recent Labs     05/24/19  2300   *   K 7.4*   CL 93*   CO2 16*   BUN 90*   CREATININE 3.57*   GLUCOSE 186*       INR:   Recent Labs     05/24/19  2300   INR 1.4       Physical Exam:  General Appearance: unresponsive, looks comfortable  Cardiovascular : bradycardic, regular rhythm, normal S1 and S2,  2/6 murmur  Pulmonary/Chest: Bilateral rhonchi, no wheezes, diminished breath sounds at bases, no respiratory distress  Abdomen: soft, non-tender, non-distended, normal bowel sounds   Extremities: 3+ pitting edema in both legs, arms  also swollen  Skin: warm and dry  Head: normocephalic and atraumatic      Assessment and Plan:     1. Altered mental status - improved. Poor prognosis overall related to multiple serious deteriorating comorbidities including CHF, worsening renal function, hypoxia. Continue with hospice care, on IV Morphine / Ativan as needed for comfort. 2. Marked bradycardia - stable, Coreg was continued  3. Acute on chronic systolic CHF. 4. Acute on chronic renal failure. 5. Hyperkalemia - calcium gluconate given in the ER. 6. Severe Hypotension - SBP has improved since admission. 7. Acute metabolic acidosis. 8. Acute Hypoxic respiratory failure - SPO2 in the 90s on a Venti mask. 9. Chronic anemia - stable.    10.   DM 2 - insulin on hold.      Code status DNR CC      Electronically signed by Ac Katz MD on 5/27/2019 at 8:48 AM    Rounding Hospitalist

## 2019-05-27 NOTE — PROGRESS NOTES
Repositioned, medicated and oral care provided. Morphine and ativan calm restlessness . Family very satisfied with patient care. Support and reassurance given to family.

## 2019-05-28 NOTE — PROGRESS NOTES
Several family members in at bedside. Am care provided-family remains in room at their request.  Resists turns slightly. Closes lips tightly when attempt made to swab mouth-allows lip balm to be applied. Tongue very dry. Very moist respirations-does have very weak cough and does cough when asked-clears secretions slightly and only for few minutes before they return. abrasion on coccyx improved from yesterday. Family feels he is resting comfortably but is concerned with his moist respirations. Nicole Rodriguez, palliative care RN in to speak with family as well as Tricia Fried and Janell Mejía.

## 2019-05-28 NOTE — PROGRESS NOTES
SW met with hospice nurse regarding potential plans. Hospice nurse states that she has seen a definite decline from yesterday. Family expressed to hospice nurse possibly moving him closer to [place] if needed. Pt has been unresponsive this morning. SW and hospice nurse went in to speak with family regarding potential plans. Discussed if pt stabilized and was able to transfer where they would like him to go. Family expresses closer to [place]. SW explained that a facility would charge them room and board and would need to have a contract with [address] [place] [address]. Family reports that they would want Medicare to cover. Explained that [facility] at [address] [place] [address] in [place] would be covered option and family reports that would be their choice then if pt has to move. Family would prefer for pt to remain here as long as possible. Pt was recently in swing bed at this hospital. Pt lives with his spouse with good family support close by. Pt was using a walker to ambulate when he was discharged. Pt is currently a DNR CC and is current with [address] [place] [address] as an inpatient. Pt follows with Dr Cherri Swanson as his PCP. Pt was able to afford medications without trouble and Barbara Penaloza will also assist with this if needed. SW will continue to follow and remain available for family and pt. Family plans for [facility] remain [facility].  Tu Ribeiro 5/28/2019

## 2019-05-28 NOTE — PROGRESS NOTES
Turned to left side, respirations snoring and decreased to 10 per minute. Morphine given, atropine given. Family called to bedside.

## 2019-05-28 NOTE — PROGRESS NOTES
Robinul lessens moist respirations some but family still requesting atropine drops when due. No restless noted at this time. Multiple family members in and out of room all morning. Much support given to family-all questions answered.

## 2019-05-28 NOTE — CARE COORDINATION
Noted that patient is admitted to [facility] under hospice care. This nurse care coordinator will remove patient from list at this time.

## 2019-05-28 NOTE — PROGRESS NOTES
Family to nurses station saying that pt sounds more garbled than he normally is. This nurse in to turn pt onto left side and respiratory suctions pts mouth. Small amount of yellow drainage noted. Atropine drops given. Will continue to monitor.

## 2019-05-28 NOTE — CONSULTS
staff  Education/support to family  Non-pain symptom management recommendations  Managing anticipatory grief  Continue with current plan of care  Code status clarified: [facility]  Pain management for comfort  Medications to decrease non-pain symptoms  Recognizing, reflecting, and empathizing with family members' anticipatory grief  frequent oral care and positioning as tolerated  Palliative Care Goals:  provide comfort care/support/palliation/relieve suffering, preparation for death, achievement of a peaceful death, spiritual needs and support for family/caregiver  Visit focus:  Routine meeting  Listen to patient/family concerns  Assess family understanding, concerns, and coping  Interdiscplinary collaboration  Provide emotional support to the family  Elicit patient's goals and values (through substituted judgment of a surrogate decision maker), and use these to establish or modify goals of care     Cinthya Fragoso RN, Shelby Parra Nurse Coordinator  5/28/2019 9:24 AM

## 2019-05-28 NOTE — PROGRESS NOTES
Pt turned onto sides many times throughout the night. Tolerated well. Pt still breathing at about 8-10 a minute. More secretions can be heard, atropine drops given q4 hours. Family has been in and out of room throughout the night. Will continue to monitor.

## 2019-05-28 NOTE — PROGRESS NOTES
Hospitalist Progress Note  5/28/2019 7:24 AM  Subjective:   Admit Date: 5/24/2019  PCP: Kayla Cazares MD    Interval History:     The patient is unresponsive this morning. Night was uneventful. Diet: Diet NPO Effective Now  Medications:   Scheduled Meds:   isosorbide mononitrate  60 mg Oral BID    sodium chloride flush  10 mL Intravenous 2 times per day     Continuous Infusions:  PRN Medications: morphine, lubrifresh P.M., LORazepam, sodium chloride flush, magnesium hydroxide, ondansetron, acetaminophen, atropine    Objective:   Vitals: BP (!) 117/44   Pulse 58   Temp 98.2 °F (36.8 °C) (Temporal)   Resp 8   Ht 5' 8\" (1.727 m)   Wt 199 lb 14.4 oz (90.7 kg)   SpO2 97%   BMI 30.39 kg/m²   BMI: Body mass index is 30.39 kg/m². Physical Exam:    General Appearance: unresponsive, looks comfortable  Cardiovascular : bradycardic, regular rhythm, normal S1 and S2,  2/6 murmur  Pulmonary/Chest: Bilateral rhonchi, no wheezes  Abdomen: soft, non-tender, non-distended, normal bowel sounds   Extremities: 3+ pitting edema in both legs, arms  also swollen  Skin: warm and dry  ic        Assessment and Plan:      1. Unresponsive state. Poor prognosis overall related to multiple serious deteriorating comorbidities including CHF, worsening renal function, hypoxia. Continue with hospice care, on IV Morphine / Ativan as needed for comfort.   1. Marked bradycardia - stable, Coreg was discontinued  2. Acute on chronic systolic CHF. 3. Acute on chronic renal failure. 4. Hyperkalemia - calcium gluconate given in the ER. 5. Severe Hypotension - SBP has improved since admission.   6. Acute metabolic acidosis. 7. Acute Hypoxic respiratory failure - SPO2 in the 90s on a Venti mask.   8. Chronic anemia - stable.    9.   DM 2 - insulin on hold.         Electronically signed by Sri Jasso MD on 5/28/2019 at 7:24 AM    Rounding Hospitalist

## 2019-05-29 NOTE — PROGRESS NOTES
Phase II Cardiac Rehab Individualized Treatment Plan-Discharge    Patient Name: Nichole Lara  Date of Initial Assessment: 4/15/2019  ACCOUNT #: [de-identified]  Diagnosis: HEART FAILIURE   Onset Date: 3/19/19  Referring Physician: DR. Juan Alberto West  Risk Stratification: MODERATE  Session Number:  10  PT DISCHARGED AFTER BEING PLACED ON MEDICAL HOLD PREVIOUSLY D/T CLINICAL STABILITY AND PT CONDITION PROHIBITING RETURN     EXERCISE    Stages of Change:   [] pre-contemplation   [] Action   [] Contemplate   [] Maintainence   [] Prep   [x] Relapse          Exercise Prescription:  Mode:   TM   UBE X STP ? EL ? R  Frequency: 3 DAYS PER WEEK  Duration: 31-60 MINUTES  (5 - 10 MIN WORK TO 4 MIN REST WORK INTERVALS)  Intensity: 1.3-1.8 METS  Target HR 72-84    Maximum   Plan/Goal: Increase 1-2 levels/week or 1-2 min/week to achieve target HR and RPE   11-13. ? Angina with Exertion            X Resistance Training  introduce 8-12 bilateral UE and LE progressive resistance exercises at 1-3 sets per lift, on 2-3 non-consecutive days using weights/ YELLOW therabands AND WTS TO 8-24 # for 8-15 reps to progressive overload by increasing resistance once reps progressed to at least 15 reps on at least 2 occasions    Hypertension:  [x] Yes  [] No  Resting BP: 128/68  Peak Exercise BP: 132/64  [] Med Change?     Intervention:  Home Exercise:  Type: WALKING  Duration: 10-20 MINUTES (WORK : REST INTERVALS)  Frequency: 2-3 DAYS PER WEEK   X Resistance Training introduce 8-12 bilateral UE and LE progressive resistance exercises at 1-3 sets per lift, on 2-3 non-consecutive days using weights/ YELLOW therabands AND WTS TO 8-24 # for 8-15 reps to progressive overload by increasing resistance once reps progressed to at least 15 reps on at least 2 occasions       Depression Screening:  UNABLE TO COMPLETE    Education:   [x] Education Goals PARTIALLY met        Target Goal:   -Individual Exercise Plan  -Bp< 130/80  -Aerobic active 30 + minutes 5-7 days per week    Nutrition    Stages of Change:   [] pre-contemplation   [x] Action   [] Contemplate   [] Maintainenc   [] Prep   [] Relapse    Lipids:   NO NEW MEDICATIONS  [] Med Change? NO    Diabetes:  [x] Yes  [] No  FBS: 108  HbA1c: 6.2  [] Med Change? NO  Random BS: 108  [x] BS in range    Weight Management:  Weight: 202.4  Height: 66 IN (2.82 M2)  BMI: 32.6  Wt Goal: KEEP WT W/IN  1-2 LB DAILY  Special Diet: Special Diet:  1,800 Kcal / day, 2 GM Na+, 30% total fat, <10% saturated fat, 25-35 GM fiber, cholesterol reduced, balanced nutrition plan to be promoted and taught in rehab      Intervention:   [] Dietitian Consult       [x] Nurse/Patient Discussion     [x] Diet Class           [] Referred to Diabetes Education     Education:  [x] Education Goals met    Target Goal:  -LDL-C<100 if triglycerides are > 200  -LDL-C < 70 for high risk patients  -HbA1c < 7%  -BMI < 25   Education    Stages of Change:    [] pre-contemplation   [x] Action   [] Contemplate   [] Maintainence   [] Prep   [] Relapse    Knowledge test score: 100%      Family support: [x] Yes  [] No    Tobacco use: [] Yes  [x] No    Intervention:  [] Referred to smoking cessation counselor     [] Individual education and counseling  [] Tobacco adjunct  [] Informed of education class schedule     Education:   [x] Education goals PARTIALLY met    Target Goal:  -Complete cessation of tobacco use (if applicable)  -Continued risk factor modifications  -Recognizing signs/symptoms to report  -Proper use of meds    Psychosocial  Stages of Change:    [] pre-contemplation   [x] Action   [] Contemplate   [] Maintainence   [] Prep   [] Relapse    Psychosocial Test:  Tool Used: [employer] Quality of Life  Score: NOT ABLE TO COMPLETE  Depression screening score PHQ-9: NOT ABLE TO COMPLETE  []Medication change?  NONE    Education:    [x] Education Goals PARTIALLY met    Target Goal:  -Assess presence or absence of depression using a valid screening tool. -Maximize coping skills.  -Positive support system. Preventative Medication:   [x] Aspirin       [x] Beta Blockade      [x] Statin or other lipid lowering agent     [x] Clopidogrel   [x] ACE Inhibitor   [x] Other anticoagulation medications     Fall Risk assess: [x] Yes  [] No / MODERATE FALL RISK  Assistive Device:   [] Cane  [x] Walker [] Wheel Chair  [] Gait belt    Patient/Program goal:   \"HAVE THE CONDITIONING TO GARDEN, WALK, AND GENERALLY GET BETTER. \" / GOALS NOT MET D/T FAILURE TO PROGRESS     DEVELOP  STRATEGIES TO PREVENT EXACERBATIONS OF HEART FAILURE / PARTIALLY MET     TO NOT GAIN MORE THAN 2 LBS IN A DAY AND 5 LBS IN A WEEK OF WATER WEIGHT / PARTIALLY MET     -increased stamina/strength to 30-50 total exercise by increasing 1-2 level/wk and 1-2   min/wk  to achieve THR and RPE 11-15 / INCREASE AVG CARDIO FC BY AT LEAST 0.5-1.0 MET IN THE NEXT 30 DAYS AND TOLERATE >31-45 MIN W/IN EX RX TARGETS / NOT MET     DECREASED DYSPNEA AND FATIGUE WITH EXERTION / NOT MET     BODY WT STAYING W/IN 1 -2 LB VARIANCE PER DAY / PARTIALLY MET     -introduce 8-12 bilateral UE and LE progressive resistance exercises at 1-3 sets per lift, on 2-3 non-consecutive days using weights/ GREEN therabands AND WTS TO 8-24 # for 10-15 reps to progressive overload by increasing resistance once reps progressed to at least 15 reps on at least 2 occasions / NOT MET     -MAINTAIN OPTIMAL.  BP / GOAL MET     -improved cholesterol and  Triglycerides / NOT NEW RESULTS     -develop regular exercise 30 min daily, AT LEAST 3-5 DAYS PER WEEK CONSISTENTLY W/IN THE NEXT 30 DAYS / NOT MET     Lower daily fasting blood glucose score to <131 and random after meal scores to <181 at home / BLOOD SUGAR REMAINED AT GOAL     To reduce self-reported psycho-social feelings of stress in next 30 days / NOT MET     To eat at least 2 servings of fruit per day and at least 4-5 servings of vegetables per day / PARTIALLY MET      Physician Changes/Comments:      Cardiac Rehab Staff:  Jayne THOMAS RN, CEP

## 2019-05-29 NOTE — PROGRESS NOTES
Hospitalist Progress Note  5/29/2019 7:41 AM  Subjective:   Admit Date: 5/24/2019  PCP: Jace Johnson MD    Interval History:     Patient remains unresponsive. Yesterday he had a lot of secretions, was started on Robinol injections, also getting atropine drops every 2 hours. Looks comfortable this morning. Urine output is low. Diet: Diet NPO Effective Now  Medications:   Scheduled Meds:   isosorbide mononitrate  60 mg Oral BID    sodium chloride flush  10 mL Intravenous 2 times per day     Continuous Infusions:  PRN Medications: atropine, glycopyrrolate, morphine, lubrifresh P.M., LORazepam, sodium chloride flush, magnesium hydroxide, ondansetron, acetaminophen    Objective:   Vitals: BP (!) 112/41   Pulse 66   Temp 98.4 °F (36.9 °C) (Temporal)   Resp 10   Ht 5' 8\" (1.727 m)   Wt 199 lb 14.4 oz (90.7 kg)   SpO2 95%   BMI 30.39 kg/m²   BMI: Body mass index is 30.39 kg/m². Physical Exam:    General Appearance: unresponsive, looks comfortable  Cardiovascular :  regular rhythm, normal S1 and S2,  2/6 murmur  Pulmonary/Chest: Bilateral rhonchi, no wheezes  Abdomen: soft, non-tender, non-distended, normal bowel sounds   Extremities: 3+ pitting edema in both legs, arms  also swollen  Skin: warm and dry      Assessment and Plan:        1. Unresponsive state. Poor prognosis overall related to multiple serious deteriorating comorbidities including CHF, worsening renal function, hypoxia.  Continue with hospice care, on IV Morphine / Ativan as needed for comfort. Continue with atropine drops and Robinul  1. Marked bradycardia - improved after Coreg was discontinued  2. Acute on chronic systolic CHF. 3. Acute on chronic renal failure. 4. Hyperkalemia - calcium gluconate given in the ER. 5. Severe Hypotension - SBP has improved since admission.   6. Acute metabolic acidosis. 7. Acute Hypoxic respiratory failure - SPO2 in the 90s on a Venti mask.   8. Chronic anemia - stable.    9.   DM 2 - insulin on hold.       Continue with hospice care    Electronically signed by Yasmine Estrella MD on 5/29/2019 at 7:41 AM    Rounding Hospitalist

## 2019-05-29 NOTE — PROGRESS NOTES
Pt in bed,  Is unresponsive to verbal stimuli, responds with withdraw to painful stim. Oral care given, back of throat suctioned for large amount of thick yellow mucus. Minimal gag reflex noted. Complete bath and cruz care completed. Pt repositioned.

## 2019-05-30 NOTE — PROGRESS NOTES
[facility] hospice nurse in this morning and states that pt still remains inpatient status for them. Plan remains for pt to remain here as long as he remains inpatient status with hospice. SW will continue to remain available.  Len Poole 5/30/2019

## 2019-05-30 NOTE — PROGRESS NOTES
[facility] [place] [facility] to the [facility] hospice nurse in and updated at 1020 am.Electronically signed by Shay Munguia RN on 5/30/2019 at 11:54 AM

## 2019-05-30 NOTE — PROGRESS NOTES
Pt lays on left side with pillow support. Resp even and unlabored. No s/s of pain, no grimace noted. Per report patient VS stable t/o the shift. Urinary catheter patent and draining yellow urine.  Electronically signed by Anshu Victor RN on 5/30/2019 at 7:59 AM

## 2019-05-30 NOTE — PROGRESS NOTES
AM assessment and Vs complete. 2 Hour repositioning and oral care performed t/o the morning. Oral suctioning prn. Pt noted to have small periods of apnea 5-10 seconds. Resp shallow. Lungs rhonchi t/o. Moist cough noted, pt does grimace and bite down with oral care. Chery patent and draining clear yellow urine. Family updated with each interaction.   Transfer of care at 11 am. Electronically signed by Karen Toro RN on 5/30/2019 at 11:53 AM

## 2019-05-30 NOTE — PROGRESS NOTES
Hospitalist Progress Note  5/30/2019 7:43 AM  Subjective:   Admit Date: 5/24/2019  PCP: Cliff Cardoso MD    Interval History:    The patient continues to remain unresponsive, vitals stable. Per RN his IV access is compromised and the patient will need to be on sublingual morphine and Ativan for hospice care management    Diet: Diet NPO Effective Now  Medications:   Scheduled Meds:   sodium chloride flush  10 mL Intravenous 2 times per day     Continuous Infusions:  PRN Medications: LORazepam, Morphine Sulfate (Concentrate), atropine, glycopyrrolate, lubrifresh P.M., sodium chloride flush, magnesium hydroxide, ondansetron, acetaminophen    Objective:   Vitals: BP (!) 123/46   Pulse 68   Temp 97.2 °F (36.2 °C) (Temporal)   Resp 14   Ht 5' 8\" (1.727 m)   Wt 199 lb 14.4 oz (90.7 kg)   SpO2 93%   BMI 30.39 kg/m²   BMI: Body mass index is 30.39 kg/m². Physical Exam:       General Appearance: unresponsive, looks comfortable  Cardiovascular :  regular rhythm, normal S1 and S2,  2/6 murmur  Pulmonary/Chest: Bilateral rhonchi, no wheezes  Abdomen: soft, non-tender, non-distended, normal bowel sounds   Extremities: 3+ pitting edema in both legs, arms  also swollen  Skin: warm and dry      Assessment and Plan:       1.  Unresponsive state. Continue with hospice care, on IV Morphine / Ativan as needed for comfort. Will change to sublingual.  Continue with atropine drops and Robinul  1. Marked bradycardia - improved after Coreg was discontinued  2. Acute on chronic systolic CHF. 3. Acute on chronic renal failure. 4. Severe Hypotension - SBP has improved since admission.   5. Acute metabolic acidosis. 6. Acute Hypoxic respiratory failure - SPO2 in the 90s on a Venti mask. 7. Chronic anemia - stable.    8.   DM 2 - insulin on hold.       Continue with hospice care.           Electronically signed by Elly Ocampo MD on 5/30/2019 at 7:43 AM    Rounding Hospitalist

## 2019-05-30 NOTE — PLAN OF CARE
Problem: Falls - Risk of:  Goal: Will remain free from falls  Description  Will remain free from falls  5/30/2019 1617 by Leisa Mercado RN  Outcome: Met This Shift  5/30/2019 1136 by Leisa Mercado RN  Outcome: Met This Shift     Problem: Risk for Impaired Skin Integrity  Goal: Tissue integrity - skin and mucous membranes  Description  Structural intactness and normal physiological function of skin and  mucous membranes.   Outcome: Met This Shift     Problem: Pain:  Goal: Control of acute pain  Description  Control of acute pain  Outcome: Met This Shift     Problem: OXYGENATION/RESPIRATORY FUNCTION  Goal: Patient will maintain patent airway  Outcome: Met This Shift     Problem: Health Behavior:  Goal: Identification of resources available to assist in meeting health care needs will improve  Description  Identification of resources available to assist in meeting health care needs will improve  Outcome: Met This Shift     Problem: Skin Integrity:  Goal: Risk for impaired skin integrity will decrease  Description  Risk for impaired skin integrity will decrease  Outcome: Met This Shift

## 2019-05-30 NOTE — PROGRESS NOTES
Patient was alone in the room. I pulled up a chair and sat with him. Talked for bit. Showed him the \"Healing Hands of [place]\". I knew he could not see them with his human eyes, but encouraged him to see them with his heart. Talked about reaching out for those hands when he was ready. I read Ps 23 to him, as well as Psalm 138 and 139. I just sat in silence with him for awhile.

## 2019-05-30 NOTE — PROGRESS NOTES
Oral cavity suctioned for same thick yellow mucus with minimal gag reflex or cough noted. Repositioned frequently. Skin is warm to the touch.

## 2019-05-31 NOTE — PROGRESS NOTES
Pt with cough, trying to clear secretions. Oral care provided. Atropine drops given as ordered. Pt repositioned onto right side. Screams out \"ouch\" with turning. Will medicate with PRN morphine when available. Will continue to monitor.

## 2019-05-31 NOTE — PROGRESS NOTES
Oral care provided. Patient repositioned. Lip moisturizer appllied. Periods of apnea observed for 10 seconds. [facility] Nurse in to see patient at this time. Family member remains at bedside.

## 2019-05-31 NOTE — PROGRESS NOTES
Pt alert to this nurses voice. Oriented to person and year. Follows commands. Denies pain at this time. Denies needing repositioned at this time. Oral care provided. Will continue to monitor.

## 2019-05-31 NOTE — PROGRESS NOTES
Pt less agitated at this time. Calm, quiet in bed. Family requests to call [place] (son in law) with any condition changes. [phone]. Family going home at this time. Bed alarm on. Will continue to monitor.

## 2019-05-31 NOTE — PROGRESS NOTES
Family at bedside. Pt attempts to sit up, states help me sit up. Gets agitated when this nurse tries to reorient pt to situation. States \" I have to pee. \" Pt aware catheter in place. Pt medicated with PRN ativan at this time. Family remains at bedside, this nurse remains at bedside. Encouraged to not stimulate pt as much as possible. Family states that they would like pt medicated with PRN medications as often as possible. Family very upset that patient is restless. This nurse remains at bedside. Support given to family.

## 2019-05-31 NOTE — PROGRESS NOTES
Bath complete including oral care and cruz care. Bites down on mouth sponge with oral care. Small open blood blister noted on posterior right testicle-cleansed and patted dry; 3 inch linear abrasion noted on right post mid rib area-lower portion weeps small amount serous drainage-cleansed and patted dry, coccyx area with area of abrasion-purple but intact. Repositioned onto left side. Moans with facial grimaces some with turns. Strong cough noted which clears airway. Medicated with morphine for comfort. See eMAR.

## 2019-05-31 NOTE — PROGRESS NOTES
Family member in at bedside. Long discussion regarding wanting to keep pt comfortable. Family does not like to see pt restless and \"struggling\". Would like to have pt medicated for pain/anxiety whenever medication is due. Pt moving legs, grimacing at times. See eMAR.

## 2019-05-31 NOTE — PROGRESS NOTES
Patient's family member out to nurse's station, states patient is grimacing at times. This nurse in to medicate patient with prn morphine. Education provided on dose, frequency, and time of next available dose to family member; appreciative of care.

## 2019-05-31 NOTE — PROGRESS NOTES
Hospitalist Progress Note  5/31/2019 6:37 AM  Subjective:   Admit Date: 5/24/2019  PCP: Ludwin Shepherd MD    Interval History:   Mr. Nimco Powell unresponsive this morning. Per RN last evening the patient was more awake, talking to nursing staff, knew his name. Patient vitals stable, still has good urine output. Diet: Diet NPO Effective Now  Medications:   Scheduled Meds:   sodium chloride flush  10 mL Intravenous 2 times per day     Continuous Infusions:  PRN Medications: LORazepam, Morphine Sulfate (Concentrate), atropine, glycopyrrolate, lubrifresh P.M., sodium chloride flush, magnesium hydroxide, ondansetron, acetaminophen    Objective:   Vitals: /63   Pulse 94   Temp 98.4 °F (36.9 °C) (Axillary)   Resp 9   Ht 5' 8\" (1.727 m)   Wt 199 lb 14.4 oz (90.7 kg)   SpO2 98%   BMI 30.39 kg/m²   BMI: Body mass index is 30.39 kg/m². Physical Exam          General Appearance: unresponsive, looks comfortable, had to cough spells  Cardiovascular :  regular rhythm, normal S1 and S2,  2/6 murmur  Pulmonary/Chest: Bilateral rhonchi, no wheezes  Abdomen: soft, non-tender, non-distended, normal bowel sounds   Extremities: 2+ pitting edema in both legs  Skin: warm and dry    Assessment and Plan:        1. Unresponsive state. Continue with hospice care, morphine and Ativan unchanged 2 sublingual, continue sublingual atropine  Per  the patient is to remain in inpatient hospice  2 . Bradycardia - improved after Coreg was discontinued  3. Acute on chronic systolic CHF. 4. Acute on chronic renal failure. 5. Acute metabolic acidosis. 6. Acute Hypoxic respiratory failure - on 50% FiO2  7.  Chronic anemia -   8.   DM 2 - insulin on hold.     Poor prognosis      Electronically signed by Carmen Bianchi MD on 5/31/2019 at 6:37 AM    Rounding Hospitalist

## 2019-06-01 NOTE — PROGRESS NOTES
Hospitalist Progress Note  6/1/2019 9:08 AM  Subjective:   Admit Date: 5/24/2019  PCP: Trish Pardo MD    Interval History:     The patient looks comfortable. Per RN he developed episodes of apnea. Patient's 2 sons are at bedside. They are updated on patient's condition. All questions answered. Patient still has stable vitals and making urine    Diet: Diet NPO Effective Now  Medications:   Scheduled Meds:   sodium chloride flush  10 mL Intravenous 2 times per day     Continuous Infusions:  PRN Medications: LORazepam, Morphine Sulfate (Concentrate), atropine, glycopyrrolate, lubrifresh P.M., sodium chloride flush, magnesium hydroxide, ondansetron, acetaminophen    Objective:   Vitals: /69   Pulse 109   Temp 97.6 °F (36.4 °C) (Temporal)   Resp 8   Ht 5' 8\" (1.727 m)   Wt 199 lb 14.4 oz (90.7 kg)   SpO2 98%   BMI 30.39 kg/m²   BMI: Body mass index is 30.39 kg/m². Physical Exam:  General Appearance: unresponsive, looks comfortable, had 2 episodes of apnea examination lasting about 10 seconds  Cardiovascular :  regular rhythm, normal S1 and S2,  2/6 murmur  Pulmonary/Chest: Bilateral rhonchi, no wheezes  Abdomen: soft, non-tender, non-distended, normal bowel sounds   Extremities: 2+ pitting edema in both legs        Assessment and Plan:        1. Unresponsive state. Continue with hospice care, morphine and Ativan changed to  sublingual, continue sublingual atropine  Per  the patient is to remain in inpatient hospice  2 . Bradycardia - improved after Coreg was discontinued  3. Acute on chronic systolic CHF. 4. Acute on chronic renal failure. 5. Acute metabolic acidosis. 6. Acute Hypoxic respiratory failure - on 50% FiO2  7.  Chronic anemia -   8.   DM 2 - insulin on hold

## 2019-06-01 NOTE — PROGRESS NOTES
Pt bathed with cruz care and oral care provided. Gown changed and pt repositioned for comfort. 0533-Pt medicated with Morphine 0.5 ml SL as ordered.

## 2019-06-01 NOTE — PROGRESS NOTES
Audible congestion and periods of apnea noted. Oral care given and pt's oral cavity suctioned for small amount of thick yellow mucus. Pt bites suction catheter on occasion. Does not respond to verbal cues. Family remains at bedside.

## 2019-06-01 NOTE — PROGRESS NOTES
Repositioned, assessment is unchanged at this time. Periods of apnea continue. Generalized edema noted. Discussion with son-in-laws about plan of care and end of life stages had with questions answered.

## 2019-06-01 NOTE — PROGRESS NOTES
Family departs. Pt repositioned frequently through the day. Oral care given,  Pt continues to have periods of apnea that lasts approx 25-30 seconds.

## 2019-06-01 NOTE — PLAN OF CARE
Problem: Falls - Risk of:  Goal: Will remain free from falls  Description  Will remain free from falls  Outcome: Ongoing     Problem: Risk for Impaired Skin Integrity  Goal: Tissue integrity - skin and mucous membranes  Description  Structural intactness and normal physiological function of skin and  mucous membranes.   Outcome: Ongoing     Problem: Coping:  Goal: Ability to participate in care decisions during the dying process will improve  Description  Ability to participate in care decisions during the dying process will improve  6/1/2019 1528 by Burak Chua RN  Outcome: Ongoing  6/1/2019 0221 by Katy Gruber RN  Outcome: Not Met This Shift  Goal: Family's ability to cope with current situation will improve  Description  Family's ability to cope with current situation will improve  Outcome: Ongoing     Problem: Skin Integrity:  Goal: Risk for impaired skin integrity will decrease  Description  Risk for impaired skin integrity will decrease  6/1/2019 0221 by Katy Gruber RN  Outcome: Ongoing

## 2019-06-01 NOTE — PROGRESS NOTES
Pt repositioned to right side. Family members back in room. Medicated with Atropine and Morphine 0.5 ml as ordered.

## 2019-06-01 NOTE — PLAN OF CARE
Problem: Falls - Risk of:  Goal: Will remain free from falls  Description  Will remain free from falls  Outcome: Ongoing     Problem: Risk for Impaired Skin Integrity  Goal: Tissue integrity - skin and mucous membranes  Description  Structural intactness and normal physiological function of skin and  mucous membranes.   Outcome: Ongoing     Problem: Skin Integrity:  Goal: Risk for impaired skin integrity will decrease  Description  Risk for impaired skin integrity will decrease  6/1/2019 0221 by Jacob Stanley RN  Outcome: Ongoing

## 2019-06-01 NOTE — PROGRESS NOTES
Repositioned to left side. Oral care provided and moisturizer applied. Respirations 6-8 with 20 to 25 seconds of apnea at times. With oral care pt does not try to bite down on swab. Family went home for the evening and requested to be called for any changes.

## 2019-06-01 NOTE — PROGRESS NOTES
Pt in bed with head elevated. Pt is mostly unresponsive with moist respirations. Oral care given and back of throat suctioned. Pt repositioned for comfort. During turning pt grimaces slightly. Family at bedside. 20-30 second periods of apnea noted.

## 2019-06-01 NOTE — PROGRESS NOTES
Repositioned to right side. Spontaneous cough noted. O2 sats remain 97-99%. Resp cont at 8/min. 0052-Atropin 1% 2 drops given for secretions. Eye drops applied as well.

## 2019-06-01 NOTE — PLAN OF CARE
Pt remains on BR for this shift. Unable to rate pain. Pt unresponsive at this juncture. O2 sats 98%.

## 2019-06-02 NOTE — PROGRESS NOTES
Pt continues to have moist respirations. Pt coughs, nurse enters the room as patient coughs again then respirations cease. Pt turns dusky. 1725 No respirations, heart beat or BP noted. Family called. 1 Son in laws here and informed of patients death. Supportive care given. States that they are going to tell the wife and daughters. Family take personal belongings and departs. [facility] called and message left. Dr. Adelso Wallace in to pronounce patient. [address] returns call and updated on patients death. Dr. Dariusz Paniagua notified of death.

## 2019-06-02 NOTE — PROGRESS NOTES
Pt turned and repositioned. Pt clamps down on yankar suction catheter. Audible moist secretions noted.

## 2019-06-02 NOTE — PROGRESS NOTES
Hospitalist Progress Note  6/2/2019 8:51 AM  Subjective:   Admit Date: 5/24/2019  PCP: Meseret Ma MD    Interval History:   Not much change in condition per RN, Night was uneventful. Family at bedside. Pt looks comfortable. Diet: Diet NPO Effective Now  Medications:   Scheduled Meds:  Continuous Infusions:  PRN Medications: LORazepam, Morphine Sulfate (Concentrate), atropine, glycopyrrolate, lubrifresh P.M., magnesium hydroxide, ondansetron, acetaminophen    Objective:   Vitals: BP (!) 95/53   Pulse 101   Temp 98.9 °F (37.2 °C) (Temporal)   Resp 12   Ht 5' 8\" (1.727 m)   Wt 199 lb 14.4 oz (90.7 kg)   SpO2 94%   BMI 30.39 kg/m²   BMI: Body mass index is 30.39 kg/m². Physical Exam:    Physical Exam:  General Appearance: unresponsive, looks comfortable  Cardiovascular :  regular rhythm, normal S1 and S2,  2/6 murmur  Pulmonary/Chest: coarse Bilateral rhonchi, no wheezes  Abdomen: soft, non-tender, non-distended, normal bowel sounds   Extremities: 2+ pitting edema in both legs        Assessment and Plan:         1. Unresponsive state. Continue with hospice care, morphine and Ativan changed to  sublingual, continue sublingual atropine  Per  the patient is to remain in inpatient hospice  2 . Bradycardia - improved after Coreg was discontinued  3. Acute on chronic systolic CHF. 4. Acute on chronic renal failure.   5. Acute metabolic acidosis.   6. Acute Hypoxic respiratory failure - on 50% FiO2  7. Chronic anemia -   8.   DM 2 - insulin on hold        Electronically signed by Ryan Ku MD on 6/2/2019 at 8:51 AM    Rounding Hospitalist

## 2019-06-02 NOTE — PROGRESS NOTES
Pt repositioned, oral care given and oral cavity suctioned. No changes in assessment. Son in laws remains in room.

## 2019-06-02 NOTE — PROGRESS NOTES
Pt repositioned on left side. Respirations are still moist.  Pt medicated with morphine for comfort. Less apnea noted at this time. Son in laws in the room with patient.

## 2019-06-02 NOTE — PROGRESS NOTES
Pt repositioned and given Morphine PRN comfort. Moist respirations continue. Family has gone home for the night.

## 2022-10-04 NOTE — PROGRESS NOTES
Pending Prescriptions:                       Disp   Refills    LYRICA 200 MG capsule                      90 cap*1        Sig: TAKE ONE CAPSULE BY MOUTH THREE TIMES A DAY (DAW1)    Routing refill request to provider for review/approval because:  Drug not on the G refill protocol     Requested Prescriptions   Pending Prescriptions Disp Refills    LYRICA 200 MG capsule 90 capsule 1     Sig: TAKE ONE CAPSULE BY MOUTH THREE TIMES A DAY (DAW1)        There is no refill protocol information for this order            Pt coughs and expectorates thick yellow mucus, but is unable to clear secretions. Pt is dusky, oral cavity suctioned for moderate amount of mucus, then patient is less dusky. Vital signs completed. Pt grabs hand but does not follow commands.

## 2024-02-26 NOTE — PROGRESS NOTES
LOV:  10/27/23  RTC:  4/24/24     Spironolactone 25 mg tablets refilled to [facility] pharmacy per protocol.  
allergic/immunologic, and these were all negative. PHYSICAL EXAMINATION:  VITAL SIGNS:  His blood pressure was 160/60 with a heart rate of 88 and regular. Respiratory rate 18. O2 saturation 98%. Weight 184 pounds. GENERAL:  He is a pleasant 44-year-old gentleman. Denied pain. He was oriented to person, place and time. Answered questions appropriately. SKIN:  No unusual skin changes. HEENT:  The pupils are equally round and intact. Mucous membranes were dry. NECK:  No JVD. Good carotid pulses. No carotid bruits. No lymphadenopathy or thyromegaly. CARDIOVASCULAR EXAM:  S1 and S2 were normal.  No S3 or S4. Soft systolic blowing type murmur. No diastolic murmur. PMI was normal.  No lift, thrust, or pericardial friction rub. LUNGS:  Quite clear to auscultation and percussion. ABDOMEN:  Soft and nontender. Good bowel sounds. The aorta was not enlarged. No hepatomegaly, splenomegaly. EXTREMITIES:  Good femoral pulses. Good pedal pulses. No pedal edema. Skin was warm and dry. No calf tenderness. Nail beds pink. Good cap refill. PULSES:  Bilateral symmetrical radial, brachial and carotid pulses. No carotid bruits. Good femoral and pedal pulses. NEUROLOGIC EXAM:  Within normal limits. PSYCHIATRIC EXAM:  Within normal limits. LABORATORY DATA:  From 07/24, sodium 140, potassium 4.7, BUN was 37, creatinine 1.52, his GFR was 45 down slightly from 47, calcium was 10.0. Cholesterol 123 with an HDL 39, LDL 41, triglycerides 213. ALT was 11, AST was 15. Parathyroid hormone was 31.54. TSH 7.26 with a normal free thyroxine. Vitamin D 34.8. White count 8.4 with a hemoglobin 8.4 and a platelet count of 392,419. EKG showed sinus rhythm with nonspecific ST changes. Chest x-ray was unremarkable. IMPRESSION:  1. Severe CAD. 2.  Bypass surgery in 2008 at [facility]. [facility] with KAPOOR to the LAD, a vein graft to the OM and a vein graft to the right coronary artery. 3.   Inferior

## 2024-10-18 NOTE — PROGRESS NOTES
Hospitalist Progress Note  5/17/2019 7:43 AM  Subjective:   Admit Date: 5/8/2019  PCP: Lakia Chen MD    Interval History:       The patient states that he still feels shortness of breath especially with activities. Denies cough, wheezing, chest pain. He also complains of generalized weakness. He had no nausea or vomiting. Continues to diurese well with IV Bumex. Diet: DIET CARB CONTROL; Low Sodium (2 GM)  Medications:   Scheduled Meds:   bumetanide  2 mg Oral BID    guaiFENesin  600 mg Oral BID    ipratropium-albuterol  1 ampule Inhalation Q4H WA    levofloxacin  750 mg Intravenous Q48H    vitamin C  500 mg Oral Daily    aspirin EC  81 mg Oral Daily    carvedilol  12.5 mg Oral BID    DULoxetine  20 mg Oral Daily    famotidine  20 mg Oral Daily    ferrous sulfate  325 mg Oral BID    fluticasone  1 spray Nasal Daily    hydrALAZINE  100 mg Oral 3 times per day    isosorbide mononitrate  60 mg Oral BID    NIFEdipine  60 mg Oral BID    simvastatin  40 mg Oral QPM    sodium bicarbonate  650 mg Oral BID    spironolactone  25 mg Oral Daily    sodium chloride flush  10 mL Intravenous 2 times per day    heparin (porcine)  5,000 Units Subcutaneous BID    insulin lispro  0-6 Units Subcutaneous TID WC    insulin lispro  0-3 Units Subcutaneous Nightly     Continuous Infusions:   dextrose 100 mL/hr (05/12/19 1447)     PRN Medications: traMADol, acetaminophen, zolpidem, sodium chloride flush, magnesium hydroxide, ondansetron, glucose, dextrose, glucagon (rDNA), dextrose, LORazepam    Objective:   Vitals: BP (!) 146/62   Pulse 72   Temp 98.2 °F (36.8 °C) (Oral)   Resp 18   Ht 5' 8\" (1.727 m)   Wt 216 lb 4.8 oz (98.1 kg)   SpO2 94%   BMI 32.89 kg/m²   BMI: Body mass index is 32.89 kg/m².     CBC:   Recent Labs     05/15/19  0540   WBC 9.4   HGB 8.8*        BMP:    Recent Labs     05/15/19  0540 05/16/19  0555 05/17/19  0614   * 133* 135   K 4.8 4.1 4.3   CL 96* 95* 95*   CO2 23 23 23   BUN 90* 83* 88*   CREATININE 2.43* 2.28* 2.13*   GLUCOSE 275* 265* 254*       Physical Exam:       General Appearance:  Sitting in  the bed, alert and alert, cooperative, in no distress  Cardiovascular: normal rate, regular rhythm, normal S1 and S2, no murmurs  Pulmonary/Chest: Clear to auscultation bilaterally,  no wheezing, no rhonchi  Abdomen: soft, non-tender, normal bowel sounds   Extremities: Lower extremities with Ace wraps, swelling in the  arms decreased  Skin: warm and dry, no rash or erythema  Neurological: alert, oriented, normal speech, no focal findings or movement disorder       Assessment and Plan:        1.  Acute on chronic systolic CHF, EF 57% per 2D echo -  Clinically improving, will change IV Bumex 2 by mouth, discontinue Chery catheter. Continue monitoring I&Os, weight  2. Bilateral moderate size pleural effusions, fluid overload  - continue on Bumex, appreciate nephrology input, no dialysis indicated at this time. 3.  Questionable pneumonia versus bronchitis with hypoxia-  on IV Levaquin,  IV vancomycin  stopped 5/16/19 continue  on nebulizers. Steroids stopped.   Continue on oxygen supplement  4.  Acute on chronic kidney disease stage 4 - . Renal function improving  5.  Chronic anemia which is iron deficiency and CKD - on iron replacement. 6.  Diabetes mellitus type 2 - patient had episodes of hypoglycemia, insulin 70/30 discontinued, managing with  sliding scale short-acting insulin  7.  Deconditioning, generalized weakness - PT and OT for evaluation, plan is to discharge to ECF or swing bed for rehab   8. Hypermagnesemia - will check magnesium level  9.  Status post bypass surgery in 2008 at [facility] [place]. [facility] with KAPOOR to the LAD, a vein  graft to the OM, and a vein graft to right coronary artery   10. .  Chronic hip pain -on prn tramadol      Patient continues to require inpatient admission related to further treatment of CHF, discharge planning, monitoring electrolytes      Electronically signed by Sri Jasso MD on 5/17/2019 at 7:43 AM    Rounding Hospitalist pt is at her baseline functional level, so home PT recommended/Home PT

## 2025-03-04 NOTE — PROGRESS NOTES
Remains restless at times. Repositioned and Morphine IV given. When being repositioned, Sole [place]. Respiratory therapist asks patient if he knows where he is and [employer]" However he does not respond to further questions. Family enters to sit with patient. BUE elevated on pillows. Hands and forearms less swollen than this morning. BLE remain 4+. Urine output approximately 175 ml in crzu bag. No-Patient/Caregiver offered and refused free interpretation services.